# Patient Record
Sex: FEMALE | Race: WHITE | NOT HISPANIC OR LATINO | Employment: OTHER | URBAN - METROPOLITAN AREA
[De-identification: names, ages, dates, MRNs, and addresses within clinical notes are randomized per-mention and may not be internally consistent; named-entity substitution may affect disease eponyms.]

---

## 2018-01-18 ENCOUNTER — GENERIC CONVERSION - ENCOUNTER (OUTPATIENT)
Dept: OTHER | Facility: OTHER | Age: 43
End: 2018-01-18

## 2018-01-18 DIAGNOSIS — Z12.31 ENCOUNTER FOR SCREENING MAMMOGRAM FOR MALIGNANT NEOPLASM OF BREAST: ICD-10-CM

## 2018-01-24 VITALS
WEIGHT: 200 LBS | SYSTOLIC BLOOD PRESSURE: 108 MMHG | BODY MASS INDEX: 30.31 KG/M2 | DIASTOLIC BLOOD PRESSURE: 70 MMHG | HEART RATE: 76 BPM | TEMPERATURE: 98.8 F | RESPIRATION RATE: 16 BRPM | HEIGHT: 68 IN

## 2018-04-09 ENCOUNTER — VBI (OUTPATIENT)
Dept: ADMINISTRATIVE | Facility: OTHER | Age: 43
End: 2018-04-09

## 2018-09-05 ENCOUNTER — APPOINTMENT (OUTPATIENT)
Dept: RADIOLOGY | Facility: CLINIC | Age: 43
End: 2018-09-05
Payer: COMMERCIAL

## 2018-09-05 ENCOUNTER — OFFICE VISIT (OUTPATIENT)
Dept: FAMILY MEDICINE CLINIC | Facility: CLINIC | Age: 43
End: 2018-09-05
Payer: COMMERCIAL

## 2018-09-05 ENCOUNTER — TELEPHONE (OUTPATIENT)
Dept: FAMILY MEDICINE CLINIC | Facility: CLINIC | Age: 43
End: 2018-09-05

## 2018-09-05 ENCOUNTER — TRANSCRIBE ORDERS (OUTPATIENT)
Dept: RADIOLOGY | Facility: CLINIC | Age: 43
End: 2018-09-05

## 2018-09-05 VITALS
RESPIRATION RATE: 16 BRPM | WEIGHT: 213 LBS | DIASTOLIC BLOOD PRESSURE: 80 MMHG | BODY MASS INDEX: 32.28 KG/M2 | SYSTOLIC BLOOD PRESSURE: 130 MMHG | HEIGHT: 68 IN | HEART RATE: 84 BPM | TEMPERATURE: 97.6 F

## 2018-09-05 DIAGNOSIS — M25.572 ACUTE LEFT ANKLE PAIN: ICD-10-CM

## 2018-09-05 DIAGNOSIS — S93.402A SPRAIN OF LEFT ANKLE, UNSPECIFIED LIGAMENT, INITIAL ENCOUNTER: Primary | ICD-10-CM

## 2018-09-05 DIAGNOSIS — S93.402A SPRAIN OF LEFT ANKLE, UNSPECIFIED LIGAMENT, INITIAL ENCOUNTER: ICD-10-CM

## 2018-09-05 DIAGNOSIS — S80.811A ABRASION, RIGHT LOWER LEG, INITIAL ENCOUNTER: ICD-10-CM

## 2018-09-05 PROCEDURE — 3008F BODY MASS INDEX DOCD: CPT | Performed by: NURSE PRACTITIONER

## 2018-09-05 PROCEDURE — 73610 X-RAY EXAM OF ANKLE: CPT

## 2018-09-05 PROCEDURE — 99214 OFFICE O/P EST MOD 30 MIN: CPT | Performed by: NURSE PRACTITIONER

## 2018-09-05 RX ORDER — ALPRAZOLAM 0.5 MG/1
TABLET ORAL
COMMUNITY
Start: 2014-12-05 | End: 2021-10-19

## 2018-09-05 RX ORDER — MONTELUKAST SODIUM 10 MG/1
1 TABLET ORAL DAILY
COMMUNITY
Start: 2014-10-28 | End: 2021-10-19

## 2018-09-05 RX ORDER — ALBUTEROL SULFATE 2.5 MG/3ML
SOLUTION RESPIRATORY (INHALATION)
COMMUNITY
Start: 2014-10-28 | End: 2018-11-01 | Stop reason: SDUPTHER

## 2018-09-05 RX ORDER — ARIPIPRAZOLE 5 MG/1
5 TABLET ORAL DAILY
COMMUNITY
Start: 2015-02-06 | End: 2021-10-19

## 2018-09-05 RX ORDER — ESCITALOPRAM OXALATE 10 MG/1
3 TABLET ORAL DAILY
COMMUNITY
End: 2021-10-19

## 2018-09-05 RX ORDER — FLUTICASONE PROPIONATE 50 MCG
2 SPRAY, SUSPENSION (ML) NASAL DAILY
COMMUNITY
Start: 2018-01-18 | End: 2021-10-19

## 2018-09-05 RX ORDER — BUPROPION HYDROCHLORIDE 300 MG/1
300 TABLET ORAL EVERY MORNING
COMMUNITY
End: 2021-06-24

## 2018-09-05 NOTE — PROGRESS NOTES
Assessment/Plan:    1  Sprain of left ankle, unspecified ligament, initial encounter  Ice, elevation, advil prn  Ace wrap for support  - XR ankle 3+ vw left; Future    2  Acute left ankle pain  Secondary to sprain  Ace wrap  Ice, elevation, advil  - XR ankle 3+ vw left; Future    3  Abrasion, right lower leg, initial encounter  Monitor for signs of infection  Topical abx ointment  No signs of infection           Subjective:      Patient ID: Vera Chávez is a 36 y o  female who presents for ankle pain    Here for left ankle pain  Rolled ankle and fell while walking dog  Has been at a dog show in [facility] and has been walking, but now with some increased pain  Iced  Wrapped  Taking advil  The following portions of the patient's history were reviewed and updated as appropriate: allergies, current medications, past family history, past medical history, past social history, past surgical history and problem list     Review of Systems   Cardiovascular: Negative for leg swelling  No calf pain   Musculoskeletal: Positive for arthralgias (left ankle) and joint swelling  Skin: Positive for wound (scrape to right lower leg)  Neurological: Negative for numbness  Objective:      /80 (BP Location: Left arm, Patient Position: Sitting, Cuff Size: Large)   Pulse 84   Temp 97 6 °F (36 4 °C) (Tympanic)   Resp 16   Ht 5' 8" (1 727 m)   Wt 96 6 kg (213 lb)   BMI 32 39 kg/m²          Physical Exam   Constitutional: She appears well-developed and well-nourished  No distress  Cardiovascular: Normal rate  Pulmonary/Chest: Effort normal    Musculoskeletal: Normal range of motion  She exhibits edema and tenderness  Pain with palpation to left ankle both laterally and medially  Palpable pedal pulse  No discoloration  Mild edema left lateral ankle  Ace wrap applied   Skin: Skin is warm and dry  No erythema  Extensive scabbed abrasion to right lower anterior leg        Psychiatric: She has a normal mood and affect  Her behavior is normal  Judgment and thought content normal    Vitals reviewed

## 2018-09-05 NOTE — TELEPHONE ENCOUNTER
----- Message from Eve Potts sent at 9/5/2018  1:20 PM EDT -----  Please call pt and advise that ankle xray negative, no fracture

## 2018-11-01 ENCOUNTER — OFFICE VISIT (OUTPATIENT)
Dept: FAMILY MEDICINE CLINIC | Facility: CLINIC | Age: 43
End: 2018-11-01
Payer: COMMERCIAL

## 2018-11-01 VITALS
HEIGHT: 68 IN | SYSTOLIC BLOOD PRESSURE: 140 MMHG | TEMPERATURE: 97 F | DIASTOLIC BLOOD PRESSURE: 86 MMHG | HEART RATE: 82 BPM | RESPIRATION RATE: 16 BRPM | BODY MASS INDEX: 32.19 KG/M2 | WEIGHT: 212.4 LBS

## 2018-11-01 DIAGNOSIS — R03.0 ELEVATED BLOOD-PRESSURE READING WITHOUT DIAGNOSIS OF HYPERTENSION: ICD-10-CM

## 2018-11-01 DIAGNOSIS — J18.9 PNEUMONIA OF LEFT UPPER LOBE DUE TO INFECTIOUS ORGANISM: ICD-10-CM

## 2018-11-01 DIAGNOSIS — J45.20 INTERMITTENT ASTHMA, UNSPECIFIED ASTHMA SEVERITY, UNSPECIFIED WHETHER COMPLICATED: ICD-10-CM

## 2018-11-01 DIAGNOSIS — M76.62 ACHILLES BURSITIS OF LEFT LOWER EXTREMITY: Primary | ICD-10-CM

## 2018-11-01 PROCEDURE — 99214 OFFICE O/P EST MOD 30 MIN: CPT | Performed by: NURSE PRACTITIONER

## 2018-11-01 RX ORDER — ALBUTEROL SULFATE 90 UG/1
2 AEROSOL, METERED RESPIRATORY (INHALATION) EVERY 6 HOURS PRN
Qty: 6.7 G | Refills: 0 | Status: SHIPPED | OUTPATIENT
Start: 2018-11-01 | End: 2021-10-19

## 2018-11-01 RX ORDER — AZITHROMYCIN 250 MG/1
TABLET, FILM COATED ORAL
Qty: 6 TABLET | Refills: 0 | Status: SHIPPED | OUTPATIENT
Start: 2018-11-01 | End: 2018-11-05

## 2018-11-01 RX ORDER — BENZONATATE 100 MG/1
100 CAPSULE ORAL 3 TIMES DAILY PRN
Qty: 20 CAPSULE | Refills: 0 | Status: SHIPPED | OUTPATIENT
Start: 2018-11-01 | End: 2019-01-18 | Stop reason: CLARIF

## 2018-11-01 NOTE — PATIENT INSTRUCTIONS
Add over-the-counter Mucinex to your medications  See me to recheck her lungs in her blood pressure in one week  Sooner if you feel worse  Monitor your blood pressure at home and bring me a blood pressure log

## 2018-11-01 NOTE — PROGRESS NOTES
Chief Complaint   Patient presents with    Chills    Nasal Congestion     chest congestion     Foot Swelling     mostly left         Patient ID: Chani Cook is a 39 y o  female  Two new complaints:    Pt is here with new c/o uri sx that started one week ago  Pt reports nasal congestion, cough and shortness of breath  Pt states the cough is nonproductive  Pt states the cough and sob started today, the cough is from the chest  Pt notes feeling feverish and fatigued  No temp measurement at home  Pt notes sore throat at onset that has since resolved  Notes headache, horse voice and ear pressure  Tx at home with decongestants without improvement  Known exposure to her mother with similar illness prior to onset  Current pattern is worsening since onset  Pt reports hx of exercise/allergy induced asthma  No day to day inhaler use  Patient reports penicillin and cephalosporin allergy  Office BP today is stage I hypertension with decongestants in the system  No known history of chronic hypertension  Patient reports an ankle sprain injury in August to the left ankle  Since that time she has noted persistent pain in the Achilles with swelling in the area  She has tried compression hose  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  She states today she does not notice any swelling but the pain in the left Achilles continues  She denies any problems with range of motion in the foot or the ankle on the left side post injury  She would like to have the Achilles evaluated today  She has treated this with ice and rest at home without improvement  History reviewed  No pertinent past medical history  History reviewed  No pertinent surgical history  Patient Active Problem List   Diagnosis    Achilles bursitis of left lower extremity    Elevated blood-pressure reading without diagnosis of hypertension    Asthma    Pneumonia due to infectious organism       History reviewed   No pertinent family history  Immunization History   Administered Date(s) Administered    Tdap 10/28/2011       Allergies   Allergen Reactions    Cephalexin Rash    Penicillins Rash       Current Outpatient Prescriptions   Medication Sig Dispense Refill    ALPRAZolam (XANAX) 0 5 mg tablet Take by mouth      ARIPiprazole (ABILIFY) 5 mg tablet Take by mouth      buPROPion (WELLBUTRIN XL) 300 mg 24 hr tablet Take by mouth      escitalopram (LEXAPRO) 10 mg tablet Take 3 tablets by mouth daily      fluticasone (FLONASE) 50 mcg/act nasal spray 2 sprays into each nostril daily      montelukast (SINGULAIR) 10 mg tablet Take 1 tablet by mouth daily      albuterol (PROVENTIL HFA) 90 mcg/act inhaler Inhale 2 puffs every 6 (six) hours as needed for wheezing 6 7 g 0    azithromycin (ZITHROMAX) 250 mg tablet Take 2 tablets today then 1 tablet daily x 4 days 6 tablet 0    benzonatate (TESSALON PERLES) 100 mg capsule Take 1 capsule (100 mg total) by mouth 3 (three) times a day as needed for cough 20 capsule 0     No current facility-administered medications for this visit  Social History     Social History    Marital status: Single     Spouse name: N/A    Number of children: N/A    Years of education: N/A     Social History Main Topics    Smoking status: Never Smoker    Smokeless tobacco: Never Used    Alcohol use None    Drug use: Unknown    Sexual activity: Not Asked     Other Topics Concern    None     Social History Narrative    None       Review of Systems   Constitutional: Positive for chills and fatigue  HENT: Positive for congestion, postnasal drip, rhinorrhea, sinus pain and sinus pressure  Negative for sore throat and trouble swallowing  Eyes: Negative  Respiratory: Positive for cough and wheezing  Cardiovascular: Negative  Gastrointestinal: Negative  Musculoskeletal: Positive for arthralgias           Objective:    /86 (BP Location: Left arm, Patient Position: Sitting, Cuff Size: Standard)   Pulse 82   Temp (!) 97 °F (36 1 °C)   Resp 16   Ht 5' 8" (1 727 m)   Wt 96 3 kg (212 lb 6 4 oz)   BMI 32 30 kg/m²        Physical Exam   Constitutional: She appears well-developed and well-nourished  HENT:   TMs with scant effusion bilateral no erythema or exudate  The oropharynx is moist and clear no exudate  Turbinates are edematous and erythematous with clear discharge  There is clear postnasal drip  Eyes: Conjunctivae are normal    Cardiovascular: Normal rate, regular rhythm and normal heart sounds  Pulmonary/Chest: She has wheezes  Wheezes and rhonchi entire left lung fields  The right lung is clear  Musculoskeletal: She exhibits tenderness  Tenderness and palpable elevation at the midpoint of the Achilles on the left lower extremity  Lymphadenopathy:     She has no cervical adenopathy  Assessment/Plan:    No problem-specific Assessment & Plan notes found for this encounter  Diagnoses and all orders for this visit:    Achilles bursitis of left lower extremity  Comments: Follow-up with Orthopedic for eval   Avoid NSAIDs for now due to drug drug interactions  Orders:  -     Ambulatory referral to Orthopedic Surgery; Future    Elevated blood-pressure reading without diagnosis of hypertension  Comments:  Return one week recheck  Intermittent asthma, unspecified asthma severity, unspecified whether complicated  Comments:  Exacerbation with current illness  Resume S a BA  Orders:  -     albuterol (PROVENTIL HFA) 90 mcg/act inhaler; Inhale 2 puffs every 6 (six) hours as needed for wheezing    Pneumonia of left upper lobe due to infectious organism [facility])  Comments:  Penicillin allergic  Azithromycin  Tessalon Perles  Over-the-counter Mucinex  Recheck one week sooner if worsening  Orders:  -     benzonatate (TESSALON PERLES) 100 mg capsule;  Take 1 capsule (100 mg total) by mouth 3 (three) times a day as needed for cough  -     azithromycin (ZITHROMAX) 250 mg tablet; Take 2 tablets today then 1 tablet daily x 4 days      avoid fluoroquinolones with current Achilles tendon issue  Patient Instructions   Add over-the-counter Mucinex to your medications  See me to recheck her lungs in her blood pressure in one week  Sooner if you feel worse  Monitor your blood pressure at home and bring me a blood pressure log                      Shira Loomis

## 2018-11-07 ENCOUNTER — OFFICE VISIT (OUTPATIENT)
Dept: FAMILY MEDICINE CLINIC | Facility: CLINIC | Age: 43
End: 2018-11-07
Payer: COMMERCIAL

## 2018-11-07 VITALS
BODY MASS INDEX: 32.23 KG/M2 | TEMPERATURE: 98.6 F | DIASTOLIC BLOOD PRESSURE: 80 MMHG | HEART RATE: 78 BPM | SYSTOLIC BLOOD PRESSURE: 130 MMHG | WEIGHT: 212 LBS | RESPIRATION RATE: 14 BRPM

## 2018-11-07 DIAGNOSIS — R03.0 ELEVATED BLOOD-PRESSURE READING WITHOUT DIAGNOSIS OF HYPERTENSION: ICD-10-CM

## 2018-11-07 DIAGNOSIS — J18.9 PNEUMONIA OF LEFT UPPER LOBE DUE TO INFECTIOUS ORGANISM: Primary | ICD-10-CM

## 2018-11-07 DIAGNOSIS — R05.9 COUGH: ICD-10-CM

## 2018-11-07 DIAGNOSIS — J45.20 INTERMITTENT ASTHMA, UNSPECIFIED ASTHMA SEVERITY, UNSPECIFIED WHETHER COMPLICATED: ICD-10-CM

## 2018-11-07 PROCEDURE — 99213 OFFICE O/P EST LOW 20 MIN: CPT | Performed by: NURSE PRACTITIONER

## 2018-11-07 RX ORDER — METHYLPREDNISOLONE 4 MG/1
TABLET ORAL
Qty: 21 TABLET | Refills: 0 | Status: SHIPPED | OUTPATIENT
Start: 2018-11-07 | End: 2019-01-09

## 2018-11-07 NOTE — PROGRESS NOTES
Chief Complaint   Patient presents with    Follow-up     Pneumonia        Patient ID: Dominic Javier is a 39 y o  female  Patient is here today for follow-up of cough and upper airway infection  She reports she has about 25% or so better but not 100%  She states she still notes marked fatigue and has had to take a few more days off of work  She does with no an ongoing cough that is nonproductive  She has treated this at home with azithromycin and Mucinex and an inhaler  She is using the inhaler every 4 hours with some improvement  She denies fever  She is also here for repeat blood pressure assessment off of the over-the-counter cold medication  Today's blood pressure is normotensive  History reviewed  No pertinent past medical history      Past Surgical History:   Procedure Laterality Date    TONSILLECTOMY         Patient Active Problem List   Diagnosis    Achilles bursitis of left lower extremity    Elevated blood-pressure reading without diagnosis of hypertension    Asthma    Pneumonia due to infectious organism    Cough       Family History   Problem Relation Age of Onset   Eliza Ribaylee Breast cancer Mother     Ulcerative colitis Mother     Hypertension Mother     Lung cancer Mother     Hypertension Father     Lung cancer Father     Breast cancer Maternal Grandmother     Breast cancer Maternal Aunt     Lung cancer Family        Immunization History   Administered Date(s) Administered    Tdap 10/28/2011       Allergies   Allergen Reactions    Cephalexin Rash    Penicillins Rash       Current Outpatient Prescriptions   Medication Sig Dispense Refill    albuterol (PROVENTIL HFA) 90 mcg/act inhaler Inhale 2 puffs every 6 (six) hours as needed for wheezing 6 7 g 0    ARIPiprazole (ABILIFY) 5 mg tablet Take 5 mg by mouth daily        benzonatate (TESSALON PERLES) 100 mg capsule Take 1 capsule (100 mg total) by mouth 3 (three) times a day as needed for cough 20 capsule 0    buPROPion (WELLBUTRIN XL) 300 mg 24 hr tablet 300 mg every morning        fluticasone (FLONASE) 50 mcg/act nasal spray 2 sprays into each nostril daily      montelukast (SINGULAIR) 10 mg tablet Take 1 tablet by mouth daily      ALPRAZolam (XANAX) 0 5 mg tablet Take by mouth      escitalopram (LEXAPRO) 10 mg tablet Take 3 tablets by mouth daily      Methylprednisolone 4 MG TBPK Use as directed on package 21 tablet 0     No current facility-administered medications for this visit  Social History     Social History    Marital status: Single     Spouse name: N/A    Number of children: N/A    Years of education: N/A     Social History Main Topics    Smoking status: Never Smoker    Smokeless tobacco: Never Used    Alcohol use No    Drug use: Unknown    Sexual activity: Not Asked     Other Topics Concern    None     Social History Narrative    None       Review of Systems   Constitutional: Negative  HENT: Positive for congestion  Eyes: Negative  Respiratory: Positive for cough and wheezing  Cardiovascular: Negative  Gastrointestinal: Negative  Objective:    /80 (BP Location: Left arm, Patient Position: Sitting, Cuff Size: Adult)   Pulse 78   Temp 98 6 °F (37 °C) (Temporal)   Resp 14   Wt 96 2 kg (212 lb)   BMI 32 23 kg/m²        Physical Exam   Constitutional: She appears well-developed and well-nourished  HENT:   Mouth/Throat: Oropharynx is clear and moist  No oropharyngeal exudate  Eyes: Conjunctivae are normal    Cardiovascular: Normal rate, regular rhythm and normal heart sounds  Pulmonary/Chest: She has wheezes  She has no rales  Decreased air exchange throughout  Wheeze in the upper lobes  Rhonchi upper lobes  Lymphadenopathy:     She has no cervical adenopathy  Assessment/Plan:    No problem-specific Assessment & Plan notes found for this encounter         Diagnoses and all orders for this visit:    Pneumonia of left upper lobe due to infectious organism [facility])  Comments:  See below  Elevated blood-pressure reading without diagnosis of hypertension  Comments:  Marked improvement from the previous visit  Stay away from over-the-counter cold medicine  Cough  Comments:  Continue inhaler every 4 hours  Add steroids  Complete chest x-ray  Orders:  -     XR chest pa & lateral; Future  -     Methylprednisolone 4 MG TBPK; Use as directed on package    Intermittent asthma, unspecified asthma severity, unspecified whether complicated  Comments:  Current exacerbation with current infection  See steroid above  There are no Patient Instructions on file for this visit                    Mariluz Henderson

## 2018-11-08 ENCOUNTER — TELEPHONE (OUTPATIENT)
Dept: FAMILY MEDICINE CLINIC | Facility: CLINIC | Age: 43
End: 2018-11-08

## 2018-11-08 ENCOUNTER — APPOINTMENT (OUTPATIENT)
Dept: RADIOLOGY | Facility: CLINIC | Age: 43
End: 2018-11-08
Payer: COMMERCIAL

## 2018-11-08 ENCOUNTER — TRANSCRIBE ORDERS (OUTPATIENT)
Dept: RADIOLOGY | Facility: CLINIC | Age: 43
End: 2018-11-08

## 2018-11-08 DIAGNOSIS — R05.9 COUGH: ICD-10-CM

## 2018-11-08 PROCEDURE — 71046 X-RAY EXAM CHEST 2 VIEWS: CPT

## 2018-11-08 NOTE — TELEPHONE ENCOUNTER
----- Message from Rosemarie Andres, [address] sent at 11/8/2018  1:07 PM EST -----  Call pt and let her know no pneumonia on chest xray

## 2018-11-28 ENCOUNTER — OFFICE VISIT (OUTPATIENT)
Dept: OBGYN CLINIC | Facility: CLINIC | Age: 43
End: 2018-11-28
Payer: COMMERCIAL

## 2018-11-28 VITALS
SYSTOLIC BLOOD PRESSURE: 131 MMHG | WEIGHT: 216 LBS | BODY MASS INDEX: 32.74 KG/M2 | HEIGHT: 68 IN | DIASTOLIC BLOOD PRESSURE: 85 MMHG | HEART RATE: 81 BPM

## 2018-11-28 DIAGNOSIS — M76.62 ACHILLES BURSITIS OF LEFT LOWER EXTREMITY: ICD-10-CM

## 2018-11-28 DIAGNOSIS — M76.62 ACHILLES TENDINITIS, LEFT LEG: Primary | ICD-10-CM

## 2018-11-28 PROCEDURE — 99244 OFF/OP CNSLTJ NEW/EST MOD 40: CPT | Performed by: ORTHOPAEDIC SURGERY

## 2018-11-28 NOTE — PROGRESS NOTES
Assessment/Plan:  1  Achilles tendinitis, left leg  Ambulatory referral to Physical Therapy   2  Achilles bursitis of left lower extremity  Ambulatory referral to Orthopedic Surgery    Follow-up with Orthopedic for eval   Avoid NSAIDs for now due to drug drug interactions  Scribe Attestation    I,:   Sridevi Augusto am acting as a scribe while in the presence of the attending physician :        I,:   Anthony Salas MD personally performed the services described in this documentation    as scribed in my presence :              Julisa upon examination review of x-rays from 9/05/2018 demonstrates signs and symptoms consistent with Achilles tendinitis  I do have concern however that she may have suffered a partial tear to the Achilles tendon  She does demonstrate a firm palpable nodule on the Achilles tendon  There is also suspect of a divot on the Achilles tendon that is palpated today  This does in site painful symptoms to palpation  However her Achilles tendon does have continuity as she does demonstrate negative Nesbitt sign  She is also able to do a single leg heel raise on the left foot  However this does exacerbate her symptoms  I would like to treat this conservatively with the use of a heel cup in both of her shoes to alleviate some stress on the Achilles tendon  Also I would like to provide her a prescription for physical therapy to help strengthen her Achilles and overall ankle  I would like stasis follow up with me in 6 weeks at that time should she fail to continue to improve we will discuss getting further diagnostic studies such as an MRI to question a tear to the Achilles tendon  Subjective:   Parul Benavides is a 39 y o  female who presents for initial evaluation of her left Achilles  She is referred by her primary care physician  She states that she suffered her initial ankle injury on 08/18/2018 when she stepped in a hole while walking her dog    She states she was experiencing intermittent and moderate sharp pain about the lateral aspect of her ankle  She notes that this pain in the lateral aspect of her ankle has since resolved however is still experiencing intermittent and mild to moderate soreness at the Achilles tendon  She states that her pain is exacerbated while ascending and descending stairs  Or with direct pressure from her shoes  She notes that her pain is better at rest or with low cut shoes  She does appreciate a lump on the Achilles tendon that is sore to touch  Today she denies any distal paresthesias  Review of Systems   Constitutional: Negative for chills, fever and unexpected weight change  HENT: Negative for hearing loss, nosebleeds and sore throat  Eyes: Negative for pain, redness and visual disturbance  Respiratory: Negative for cough, shortness of breath and wheezing  Cardiovascular: Negative for chest pain, palpitations and leg swelling  Gastrointestinal: Negative for abdominal pain, nausea and vomiting  Endocrine: Negative for polydipsia and polyuria  Genitourinary: Negative for dysuria and hematuria  Musculoskeletal: Positive for myalgias  See HPI   Skin: Negative for rash and wound  Neurological: Negative for dizziness, numbness and headaches  Psychiatric/Behavioral: Negative for decreased concentration and suicidal ideas  The patient is not nervous/anxious  History reviewed  No pertinent past medical history  Past Surgical History:   Procedure Laterality Date    TONSILLECTOMY         Family History   Problem Relation Age of Onset   Lizzie Montague Breast cancer Mother     Ulcerative colitis Mother     Hypertension Mother     Lung cancer Mother     Hypertension Father     Lung cancer Father     Breast cancer Maternal Grandmother     Breast cancer Maternal Aunt     Lung cancer Family        Social History     Occupational History    Not on file       Social History Main Topics    Smoking status: Never Smoker    Smokeless tobacco: Never Used    Alcohol use Yes      Comment: social     Drug use: No    Sexual activity: Not on file         Current Outpatient Prescriptions:     albuterol (PROVENTIL HFA) 90 mcg/act inhaler, Inhale 2 puffs every 6 (six) hours as needed for wheezing, Disp: 6 7 g, Rfl: 0    ALPRAZolam (XANAX) 0 5 mg tablet, Take by mouth, Disp: , Rfl:     ARIPiprazole (ABILIFY) 5 mg tablet, Take 5 mg by mouth daily  , Disp: , Rfl:     benzonatate (TESSALON PERLES) 100 mg capsule, Take 1 capsule (100 mg total) by mouth 3 (three) times a day as needed for cough, Disp: 20 capsule, Rfl: 0    buPROPion (WELLBUTRIN XL) 300 mg 24 hr tablet, 300 mg every morning  , Disp: , Rfl:     escitalopram (LEXAPRO) 10 mg tablet, Take 3 tablets by mouth daily, Disp: , Rfl:     fluticasone (FLONASE) 50 mcg/act nasal spray, 2 sprays into each nostril daily, Disp: , Rfl:     Methylprednisolone 4 MG TBPK, Use as directed on package, Disp: 21 tablet, Rfl: 0    montelukast (SINGULAIR) 10 mg tablet, Take 1 tablet by mouth daily, Disp: , Rfl:     Allergies   Allergen Reactions    Cephalexin Rash    Penicillins Rash       Objective:  Vitals:    11/28/18 1125   BP: 131/85   Pulse: 81       Left Ankle Exam   Swelling: mild (Swelling about the Achilles tendon)    Range of Motion   Dorsiflexion: 25   Plantar flexion: 45   Inversion: 45   Eversion: 25     Muscle Strength   Dorsiflexion:  5/5   Plantar flexion:  4/5     Tests   Anterior drawer: negative  Varus tilt: negative    Other   Erythema: absent  Scars: absent  Sensation: normal  Pulse: present    Comments: Nesbitt test:  Negative          Observations   Left Ankle/Foot   Negative for adhesive scar  Strength/Myotome Testing     Left Ankle/Foot   Dorsiflexion: 5  Plantar flexion: 4      Physical Exam   Constitutional: She is oriented to person, place, and time  She appears well-developed and well-nourished  HENT:   Head: Normocephalic and atraumatic     Eyes: Conjunctivae are normal  Right eye exhibits no discharge  Left eye exhibits no discharge  Neck: Normal range of motion  Neck supple  Cardiovascular: Normal rate and intact distal pulses  Pulmonary/Chest: Effort normal  No respiratory distress  Musculoskeletal:   As noted in HPI   Neurological: She is alert and oriented to person, place, and time  Skin: Skin is warm and dry  Psychiatric: She has a normal mood and affect  Her behavior is normal  Judgment and thought content normal    Nursing note and vitals reviewed  I have personally reviewed pertinent films in PACS and my interpretation is as follows:    X-rays of the left ankle from 9/5/2018 demonstrates no acute fracture other osseous abnormalities

## 2018-11-28 NOTE — LETTER
November 28, 2018     Clem B[address]   [id number]    Patient: Pearl Gallardo   YOB: 1975   Date of Visit: 11/28/2018       Dear Dr She Rodriguez: Thank you for referring Lili Garrison to me for evaluation  Below are the relevant portions of my assessment and plan of care  Julisa upon examination review of x-rays from 9/5/2018 demonstrates signs and symptoms consistent with left Achilles tendinitis  She does demonstrate a suspicious divot on the Achilles tendon as well as a palpable mass that does give me some concern for a partial tear that she may have suffered and has scarred over  The tendon does have continuity as she has a negative Nesbitt test and the Achilles is clearly intact upon palpation  I have provided her with heel cups for each 2 to alleviate tension on the tendon as well as a prescription for physical therapy  Should she fail to see improvements after 6 weeks will order an MRI to question a tear of the Achilles tendon  If you have questions, please do not hesitate to call me  I look forward to following Julisa along with you           Sincerely,        Tracy Taylor MD        CC: No Recipients

## 2018-12-06 ENCOUNTER — EVALUATION (OUTPATIENT)
Dept: PHYSICAL THERAPY | Facility: CLINIC | Age: 43
End: 2018-12-06
Payer: COMMERCIAL

## 2018-12-06 DIAGNOSIS — M76.62 ACHILLES TENDINITIS, LEFT LEG: ICD-10-CM

## 2018-12-06 PROCEDURE — 97161 PT EVAL LOW COMPLEX 20 MIN: CPT | Performed by: PHYSICAL THERAPIST

## 2018-12-06 PROCEDURE — G8978 MOBILITY CURRENT STATUS: HCPCS | Performed by: PHYSICAL THERAPIST

## 2018-12-06 PROCEDURE — G8979 MOBILITY GOAL STATUS: HCPCS | Performed by: PHYSICAL THERAPIST

## 2018-12-06 NOTE — PROGRESS NOTES
PT Evaluation     Today's date: 2018  Patient name: Grover Borges  : 1975  MRN: [id number]  Referring provider: Josey Tomlinson MD  Dx:   Encounter Diagnosis     ICD-10-CM    1  Achilles tendinitis, left leg M76 62 Ambulatory referral to Physical Therapy                  Assessment  Assessment details: Grover Borges is a 39 y o  female who presents with pain, ankle edema, decreased strength, decreased ROM, decreased joint mobility, ambulatory dysfunction and local nodule/tenderness at achilles  Due to these impairments, patient has difficulty performing recreational activities, work-related activities, ambulation, stair negotiation  Patient's clinical presentation is consistent with their referring diagnosis of Achilles tendinitis, left leg  Plan: Ambulatory referral to Physical Therapy  Patient has been educated in home exercise program and plan of care  Patient would benefit from skilled physical therapy services to address their aforementioned functional limitations and progress towards prior level of function and independence with home exercise program      Impairments: abnormal gait, abnormal or restricted ROM, activity intolerance, impaired balance, impaired physical strength, lacks appropriate home exercise program, pain with function, weight-bearing intolerance and poor body mechanics    Goals  Short Term Goals 4 WEEKS from evaluation: Target Date (1/3/19)  1  Establish [place] w/ progressing HEP for ROM/strength  2  Improve AROM L ankle P/D to be within 5 degrees of R to prepare for reciprocal stairs w/o rail and squatting  3  Improve AROM R ankle inversion/eversion by 10/5 degrees respectively to prepare for negotiation of uneven terrain  4  Improve R ankle strength to 4/5 or better  Long Term Goals 8 WEEKS from evaluation: Target Date (19)  1  Improve L ankle strength to 4+/5 or better to allow L SLS x 20" or more     2  Improve tolerance to weight bearing to 2 hours or more, including uneven terrain  3  Ankle AROM WFL to allow recipcrocal stairs w/o handrail and full ROM squatting  4  Reduce palpable nodule tenderness L achilles and L ankle edema by 50% or more  5  Resolve gait deviations  Plan  Plan details:     Patient would benefit from: PT eval and skilled physical therapy  Planned modality interventions: cryotherapy, thermotherapy: hydrocollator packs, unattended electrical stimulation and ultrasound  Other planned modality interventions: ktape  Planned therapy interventions: manual therapy, neuromuscular re-education, therapeutic activities, therapeutic exercise, home exercise program, patient education, functional ROM exercises, strengthening, stretching and joint mobilization  Frequency: 2x week  Duration in weeks: 8  Treatment plan discussed with: patient        Subjective Evaluation    History of Present Illness  Mechanism of injury: Pt is a   She was on a long road trip in 2018, with 5 days of travel ahead of her  Pt fell after twisting her ankle while walking her dog [place]  She completed her trip, with taping and ice  Upon return, she had an Xray taken by primary MD, which was negative  She wore an aircast for a short time, then changed to ACE bandage  Initially, her pain was ant/lateral ankle, but now all her pain is posterior (achilles)  Pain is worst w/ stairs and prolonged standing  She has been avoiding uneven terrain    Pain  Current pain ratin  At best pain ratin  At worst pain ratin  Location: L Achilles  Quality: throbbing and tight  Progression: no change    Social Support  Steps to enter house: yes  Stairs in house: yes     Life stress: Pt is a , so is on her feet a lot and travels often      Diagnostic Tests  X-ray: normal  Treatments  Current treatment: physical therapy  Patient Goals  Patient goals for therapy: decreased pain  Patient goal: improved tolerance to stairs and weight bearing activity, including uneven terrain which she has been avoiding        Objective  AROM:    R   L      12/6/18 12/6/18  Ankle DF calc/5thray  10/5   -2/-7  Ankle PF calc/5thray  45/70   37/54  Ankle inversion  51   52  Ankle eversion   8   3    STRENGTH:    R   L      12/6/18 12/6/18    Ankle DF   5/5   4/5*  Ankle PF   5/5   4-/5*  Ankle inversion  5/5   4/5  Ankle eversoin   5/5   4/5*    Observation:   Edema at lateral maleolus and palpable nodule at achilles insertion which is exquisitely tender to palpation  Bimaleolar circumference (cm) R 25 0/L 27 0   Fig 8 circumferenc (cm) R  52 4/L 53 0      Pt's ambulation demonstrates decreased push off  Balance:  Tandem R posterior w/ EC: 25"  Tandem L posterior w/ EC: 7"  R SLS w/ EO: 60 sec  L SLS w/ EO: 7 sec    Function:  Squat: is partial w/ ERP due to loss of DF ROM  Step up/down: There is early heel rise off step while descending 10" step w/ rail - pt notes stiffness  Precautions: htn    Daily Treatment Diary     Date  Visit # 12/6/18  1       Manual        ktape  achilles                       There Exer         ankle pumps w/ knee ext 20x       Ankle pumps prone knee flex 20x       tband inv/ever/PF grn 15x each                       There activ                                          HEP        NMRed                                                                                Modalities                                HEP = ankle pumps w/ knee extended and w/ knee flexed; TB inv/ever/PF - gave green Tband

## 2018-12-10 ENCOUNTER — OFFICE VISIT (OUTPATIENT)
Dept: PHYSICAL THERAPY | Facility: CLINIC | Age: 43
End: 2018-12-10
Payer: COMMERCIAL

## 2018-12-10 DIAGNOSIS — M76.62 ACHILLES TENDINITIS, LEFT LEG: Primary | ICD-10-CM

## 2018-12-10 PROCEDURE — 97112 NEUROMUSCULAR REEDUCATION: CPT | Performed by: PHYSICAL THERAPIST

## 2018-12-10 NOTE — PROGRESS NOTES
Daily Note     Today's date: 12/10/2018  Patient name: Rudolph Mark  : 1975  MRN: [id number]  Referring provider: Eleanor Meyer MD  Dx:   Encounter Diagnosis     ICD-10-CM    1  Achilles tendinitis, left leg M76 62                   Subjective: Pt thinks ktape was not helpful - she left it on for 2 days  Pain and instability have been "not too bad" the past few days  Objective: See treatment diary below; upon observation - raised nodule seen at evaluation at least 50% reduced  For this reason, applied Ktape again  Precautions: htn    Daily Treatment Diary     Date  Visit # 18  1 12/10/18  2      Manual        ktape  achilles achilles                      There Exer         ankle pumps w/ knee ext 20x 20x      Ankle pumps prone knee flex 20x 20x      tband inv/ever/PF grn 15x each grn 30x each              Bike   L3x5'      There activ  HEP        NMRed  15'      Tandem balance  10"x4 each      Feet together w/ EC  20"x2      Alt tap ups  6" 2x10 each                                                      Modalities        Cont US 1 5 w/cm2 1Mhz  5'                      HEP = continue ankle pumps w/ knee extended and w/ knee flexed; TB inv/ever/PF w/ green Tband  Add tandem balance/feet together w/ EC  Assessment: Tolerated treatment well and does not note discomfort w/ activities like she did the first visit    Patient would benefit from continued PT and is challenged by intro to balance exercises      Plan: Progress treatment as tolerated

## 2018-12-12 ENCOUNTER — OFFICE VISIT (OUTPATIENT)
Dept: PHYSICAL THERAPY | Facility: CLINIC | Age: 43
End: 2018-12-12
Payer: COMMERCIAL

## 2018-12-12 DIAGNOSIS — M76.62 ACHILLES TENDINITIS, LEFT LEG: Primary | ICD-10-CM

## 2018-12-12 PROCEDURE — 97112 NEUROMUSCULAR REEDUCATION: CPT | Performed by: PHYSICAL THERAPIST

## 2018-12-12 NOTE — PROGRESS NOTES
Daily Note     Today's date: 2018  Patient name: Alayna Lee  : 1975  MRN: [id number]  Referring provider: Anne Marie Suarez MD  Dx:   Encounter Diagnosis     ICD-10-CM    1  Achilles tendinitis, left leg M76 62                   Subjective: Pt states she is feeling better, less pain and feeling more stable  Objective: See treatment diary below; upon observation - raised nodule seen at evaluation at least 50% reduced  For this reason, applied Ktape again  Precautions: htn    Daily Treatment Diary     Date  Visit # 18  1 12/10/18  2 18  3     Manual        ktape  achilles achilles achilles                     There Exer         ankle pumps w/ knee ext 20x 20x      Ankle pumps prone knee flex 20x 20x      tband inv/ever/PF grn 15x each grn 30x each              Bike   L3x5' L3x7'     There activ  HEP        NMRed  15' 15'     Tandem balance  10"x4 each On discs  30"x2 each     Feet together w/ EC  20"x2      Alt tap ups  6" 2x10 each      Wobble board PD ROM   20x     Balance on airdiscs   EO 1'  EC 1'                                     Modalities        Cont US 1 5 w/cm2 1Mhz  5' 5'                     HEP = continue ankle pumps w/ knee extended and w/ knee flexed; TB inv/ever/PF w/ green Tband  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

## 2018-12-13 ENCOUNTER — APPOINTMENT (OUTPATIENT)
Dept: PHYSICAL THERAPY | Facility: CLINIC | Age: 43
End: 2018-12-13
Payer: COMMERCIAL

## 2018-12-17 ENCOUNTER — OFFICE VISIT (OUTPATIENT)
Dept: PHYSICAL THERAPY | Facility: CLINIC | Age: 43
End: 2018-12-17
Payer: COMMERCIAL

## 2018-12-17 DIAGNOSIS — M76.62 ACHILLES TENDINITIS, LEFT LEG: Primary | ICD-10-CM

## 2018-12-17 PROCEDURE — 97112 NEUROMUSCULAR REEDUCATION: CPT | Performed by: PHYSICAL THERAPIST

## 2018-12-17 NOTE — PROGRESS NOTES
Daily Note     Today's date: 2018  Patient name: Sabrina Meigs  : 1975  MRN: [id number]  Referring provider: Radha Dukes MD  Dx:   Encounter Diagnosis     ICD-10-CM    1  Achilles tendinitis, left leg M76 62                   Subjective: pt reports she was on her feet a lot the past few days w/ work/travel so was a little sore, "but not too bad"  Objective: See treatment diary below; upon observation - raised nodule seen at evaluation at least 50% reduced  For this reason, applied Ktape again  Precautions: htn    Daily Treatment Diary     Date  Visit # 18  1 12/10/18  2 18  3 18  4    Manual        ktape  achilles achilles achilles achilles                    There Exer         ankle pumps w/ knee ext 20x 20x      Ankle pumps prone knee flex 20x 20x      tband inv/ever/PF grn 15x each grn 30x each              Bike   L3x5' L3x7' L3x7'    There activ  HEP        NMRed  15' 15' 20'    Tandem balance  10"x4 each On discs  30"x2 each On discs  1' each    Feet together w/ EC  20"x2      Alt tap ups  6" 2x10 each      Wobble board PD ROM   20x 30x    Balance on airdiscs   EO 1'  EC 1' EC 1'    FSU w/ reach& balance    6"+2" pad  20x                            Modalities        Cont US 1 5 w/cm2 1Mhz  5' 5' 5'                    HEP = continue ankle pumps w/ knee extended and w/ knee flexed; TB inv/ever/PF w/ green Tband  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

## 2019-01-03 ENCOUNTER — OFFICE VISIT (OUTPATIENT)
Dept: PHYSICAL THERAPY | Facility: CLINIC | Age: 44
End: 2019-01-03
Payer: COMMERCIAL

## 2019-01-03 DIAGNOSIS — M76.62 ACHILLES TENDINITIS, LEFT LEG: Primary | ICD-10-CM

## 2019-01-03 PROCEDURE — 97140 MANUAL THERAPY 1/> REGIONS: CPT | Performed by: PHYSICAL THERAPIST

## 2019-01-03 NOTE — PROGRESS NOTES
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  She reports doing her exercises while away  Objective: See treatment diary below  Precautions: htn    Daily Treatment Diary     Date  Visit # 18  1 12/10/18  2 18  3 18  4 1/3/19  5   Manual     15'   ktape  achilles achilles achilles achilles achilles   IASTM R achilles/gastroc     10'           There Exer         ankle pumps w/ knee ext 20x 20x      Ankle pumps prone knee flex 20x 20x      tband inv/ever/PF grn 15x each grn 30x each              Bike   L3x5' L3x7' L3x7' L3x5'   There activ  HEP        NMRed  15' 15' 20'    Tandem balance  10"x4 each On discs  30"x2 each On discs  1' each    Feet together w/ EC  20"x2      Alt tap ups  6" 2x10 each      Wobble board PD ROM   20x 30x    Balance on airdiscs   EO 1'  EC 1' EC 1'    FSU w/ reach& balance    6"+2" pad  20x                            Modalities        Cont US 1 5 w/cm2 1Mhz  5' 5' 5' 5'                   HEP = continue ankle pumps w/ knee extended and w/ knee flexed; TB inv/ever/PF w/ green Tband  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

## 2019-01-07 ENCOUNTER — APPOINTMENT (OUTPATIENT)
Dept: PHYSICAL THERAPY | Facility: CLINIC | Age: 44
End: 2019-01-07
Payer: COMMERCIAL

## 2019-01-09 ENCOUNTER — OFFICE VISIT (OUTPATIENT)
Dept: OBGYN CLINIC | Facility: CLINIC | Age: 44
End: 2019-01-09
Payer: COMMERCIAL

## 2019-01-09 ENCOUNTER — EVALUATION (OUTPATIENT)
Dept: PHYSICAL THERAPY | Facility: CLINIC | Age: 44
End: 2019-01-09
Payer: COMMERCIAL

## 2019-01-09 VITALS — HEIGHT: 68 IN | WEIGHT: 209.4 LBS | BODY MASS INDEX: 31.74 KG/M2

## 2019-01-09 DIAGNOSIS — M76.62 ACHILLES TENDINITIS, LEFT LEG: Primary | ICD-10-CM

## 2019-01-09 PROCEDURE — G8979 MOBILITY GOAL STATUS: HCPCS | Performed by: PHYSICAL THERAPIST

## 2019-01-09 PROCEDURE — 97140 MANUAL THERAPY 1/> REGIONS: CPT | Performed by: PHYSICAL THERAPIST

## 2019-01-09 PROCEDURE — 99213 OFFICE O/P EST LOW 20 MIN: CPT | Performed by: ORTHOPAEDIC SURGERY

## 2019-01-09 PROCEDURE — G8978 MOBILITY CURRENT STATUS: HCPCS | Performed by: PHYSICAL THERAPIST

## 2019-01-09 NOTE — PROGRESS NOTES
Assessment/Plan:  1  Achilles tendinitis, left leg         Scribe Attestation    I,:   Gely Hanks am acting as a scribe while in the presence of the attending physician :        I,:   Anthony Salas MD personally performed the services described in this documentation    as scribed in my presence :          I am very pleased with Julisa's progress  On exam today, she does demonstrate increased range of motion and strength  She is able to perform a single leg heel raise today in the office  She does still have a palpable nodule in the mid substance of her Achilles tendon, however her Karen Florencio test is still negative  I would like her to continue with formal physical therapy and I did provide her with a new prescription for this today in the office  I reiterated to her that I am impressed with her progress and believe she will do well with continued conservative management  Should she continue to improve, we will consider discharging her to a home exercise program at her next visit  I will see her back in six weeks for repeat clinical evaluation  Subjective:   Parul Benavides is a 39 y o  female who presents today for follow-up evaluation for her left Achilles  She originally suffered an ankle injury in August after stepping in a hole  At her last visit, she was diagnosed with Achilles tendinitis and prescribed physical therapy  She has been participating in formal therapy and states that she feels like she is improving  She notes improvement in her range of motion and strength  At today's visit, she states that she is pain-free at rest but still experiences activity related soreness  She finds that stairs especially aggravate her symptoms  She has been using her heel cups as prescribed  She denies any new injury or trauma  She denies any distal paresthesias of the lower extremity  Review of Systems   Constitutional: Negative for chills, fever and unexpected weight change     HENT: Negative for hearing loss, nosebleeds and sore throat  Eyes: Negative for pain, redness and visual disturbance  Respiratory: Negative for cough, shortness of breath and wheezing  Cardiovascular: Negative for chest pain, palpitations and leg swelling  Gastrointestinal: Negative for abdominal pain, nausea and vomiting  Endocrine: Negative for polydipsia and polyuria  Genitourinary: Negative for dysuria and hematuria  Musculoskeletal: Positive for arthralgias and joint swelling  Negative for myalgias  See HPI   Skin: Negative for rash and wound  Neurological: Negative for dizziness, numbness and headaches  Psychiatric/Behavioral: Negative for decreased concentration and suicidal ideas  The patient is not nervous/anxious  History reviewed  No pertinent past medical history  Past Surgical History:   Procedure Laterality Date    TONSILLECTOMY         Family History   Problem Relation Age of Onset    Breast cancer Mother     Ulcerative colitis Mother     Hypertension Mother    Sandra Quintana Lung cancer Mother     Hypertension Father     Lung cancer Father     Breast cancer Maternal Grandmother     Breast cancer Maternal Aunt     Lung cancer Family     No Known Problems Sister     No Known Problems Brother     No Known Problems Maternal Uncle     No Known Problems Paternal Aunt     No Known Problems Paternal Uncle     No Known Problems Maternal Grandfather     No Known Problems Paternal Grandmother     No Known Problems Paternal Grandfather     ADD / ADHD Neg Hx     Anesthesia problems Neg Hx     Cancer Neg Hx     Clotting disorder Neg Hx     Collagen disease Neg Hx     Diabetes Neg Hx     Dislocations Neg Hx     Learning disabilities Neg Hx     Neurological problems Neg Hx     Osteoporosis Neg Hx     Rheumatologic disease Neg Hx     Scoliosis Neg Hx     Vascular Disease Neg Hx        Social History     Occupational History    Not on file       Social History Main Topics    Smoking status: Never Smoker    Smokeless tobacco: Never Used    Alcohol use Yes      Comment: social     Drug use: No    Sexual activity: Not on file         Current Outpatient Prescriptions:     albuterol (PROVENTIL HFA) 90 mcg/act inhaler, Inhale 2 puffs every 6 (six) hours as needed for wheezing, Disp: 6 7 g, Rfl: 0    ALPRAZolam (XANAX) 0 5 mg tablet, Take by mouth, Disp: , Rfl:     ARIPiprazole (ABILIFY) 5 mg tablet, Take 5 mg by mouth daily  , Disp: , Rfl:     benzonatate (TESSALON PERLES) 100 mg capsule, Take 1 capsule (100 mg total) by mouth 3 (three) times a day as needed for cough, Disp: 20 capsule, Rfl: 0    buPROPion (WELLBUTRIN XL) 300 mg 24 hr tablet, 300 mg every morning  , Disp: , Rfl:     escitalopram (LEXAPRO) 10 mg tablet, Take 3 tablets by mouth daily, Disp: , Rfl:     fluticasone (FLONASE) 50 mcg/act nasal spray, 2 sprays into each nostril daily, Disp: , Rfl:     montelukast (SINGULAIR) 10 mg tablet, Take 1 tablet by mouth daily, Disp: , Rfl:     Allergies   Allergen Reactions    Cephalexin Rash    Penicillins Rash       Objective: There were no vitals filed for this visit  Left Ankle Exam   Swelling: none    Tenderness   Left ankle tenderness location: Achilles tendon  Range of Motion   Dorsiflexion: 20   Plantar flexion: 50     Muscle Strength   Dorsiflexion:  5/5   Plantar flexion:  5/5   Peroneal muscle:  5/5    Other   Erythema: absent  Scars: absent  Sensation: normal  Pulse: present    Comments: Nesbitt (-)  Palpable nodule mid-substance achilles tendon  Minor loss of arch  Able to perform single leg heel raise  Double-leg heel raise causes minor "soreness"          Observations   Left Ankle/Foot   Negative for adhesive scar  Strength/Myotome Testing     Left Ankle/Foot   Dorsiflexion: 5  Plantar flexion: 5      Physical Exam   Constitutional: She is oriented to person, place, and time  She appears well-developed and well-nourished     HENT: Head: Normocephalic and atraumatic  Eyes: Pupils are equal, round, and reactive to light  Conjunctivae are normal    Neck: Normal range of motion  Neck supple  Cardiovascular: Normal rate and intact distal pulses  Pulmonary/Chest: Effort normal  No respiratory distress  Musculoskeletal:   As noted in HPI   Neurological: She is alert and oriented to person, place, and time  Skin: Skin is warm and dry  Psychiatric: She has a normal mood and affect  Her behavior is normal    Vitals reviewed  I have personally reviewed pertinent films in PACS and my interpretation is as follows: No imaging reviewed with the patient

## 2019-01-09 NOTE — PROGRESS NOTES
PT Re-Evaluation     Today's date: 2019  Patient name: Ponce Robertson  : 1975  MRN: [id number]  Referring provider: Agustin Nguyen MD  Dx:   Encounter Diagnosis     ICD-10-CM    1  Achilles tendinitis, left leg M76 [id number]                   Assessment  Assessment details: Ponce Robertson is a 39 y o  female who is progressing well with physical therapy with decreased pain and ankle edema; improving strength and ROM and improving tolerance to functional activities  Local tenderness at mid-achilles is reduced  Patient still has some difficulty performing recreational activities, work-related activities, ambulation, stair negotiation  Patient's clinical presentation is consistent with their referring diagnosis of Achilles tendinitis, left leg  Plan: Ambulatory referral to Physical Therapy  Patient has been educated in home exercise program and plan of care, and would benefit from skilled physical therapy services to address her remaining functional limitations and progress towards prior level of function and long term goals  Impairments: abnormal gait, abnormal or restricted ROM, activity intolerance, impaired balance, impaired physical strength, lacks appropriate home exercise program, pain with function, weight-bearing intolerance and poor body mechanics    Goals  Short Term Goals 4 WEEKS from evaluation: Target Date (1/3/19) - met  1  Establish [place] w/ progressing HEP for ROM/strength  2  Improve AROM L ankle P/D to be within 5 degrees of R to prepare for reciprocal stairs w/o rail and squatting  3  Improve AROM R ankle inversion/eversion by 10/5 degrees respectively to prepare for negotiation of uneven terrain  4  Improve R ankle strength to 4/5 or better  Long Term Goals 10 WEEKS from evaluation: Target Date (19) -ongoing/partially met  1  Improve L ankle strength to 4+/5 or better to allow L SLS x 20" or more  - met  2   Improve tolerance to weight bearing to 2 hours or more, including uneven terrain  - intermittently met  3  Ankle AROM WFL to allow recipcrocal stairs w/o handrail and full ROM squatting - intermittently met  4  Reduce palpable nodule tenderness L achilles and L ankle edema by 50% or more  - met  5  Resolve gait deviations  - met    Plan  Plan details:     Patient would benefit from: PT eval and skilled physical therapy  Planned modality interventions: cryotherapy, thermotherapy: hydrocollator packs, unattended electrical stimulation and ultrasound  Other planned modality interventions: ktape  Planned therapy interventions: manual therapy, neuromuscular re-education, therapeutic activities, therapeutic exercise, home exercise program, patient education, functional ROM exercises, strengthening, stretching and joint mobilization  Frequency: 2x week  Plan of Care expiration date: 2019  Treatment plan discussed with: patient        Subjective Evaluation    History of Present Illness  Mechanism of injury: Pt is a   She was on a long road trip in 2018, with 5 days of travel ahead of her  Pt fell after twisting her ankle while walking [place] dog [place]  She completed her trip, with taping and ice  Upon return, she had an Xray taken by primary MD, which was negative  She wore an aircast for a short time, then changed to ACE bandage  Initially, her pain was ant/lateral ankle, but then moved to posterior (achilles)  Pt has attended 6 PT sessions and reports feeling much better  She saw Dr Allen jorge a m  Who was happy with her progress thus far  Pain remains worseprolonged standing or uneven terrain    Pain  Current pain ratin  At best pain ratin  At worst pain rating: 3  Location: L Achilles  Quality: throbbing and tight  Progression: no change    Social Support  Steps to enter house: yes  Stairs in house: yes     Life stress: Pt is a , so is on her feet a lot and travels often      Diagnostic Tests  X-ray: normal  Treatments  Current treatment: physical therapy  Patient Goals  Patient goals for therapy: decreased pain  Patient goal: improved tolerance to stairs and weight bearing activity, including uneven terrain which she has been avoiding        Objective  AROM:    L  R  L      1/9/19 12/6/18 12/6/18  Ankle DF calc/5thray  15/8  10/5  -2/-7  Ankle PF calc/5thray  38/60  45/70  37/54  Ankle inversion  52  51  52  Ankle eversion   8 8  3    STRENGTH:    L  R  L      1/9/19 12/6/18 12/6/18    Ankle DF   5/5  5/5  4/5*  Ankle PF   4+/5*  5/5  4-/5*  Ankle inversion  5/5  5/5  4/5  Ankle eversoin   4+/5  5/5  4/5*    Pt can now perform L uni HR w/ minor discomfort 5x  Observation:   Edema at lateral maleolus and palpable nodule at achilles insertion which is exquisitely tender to palpation  Bimaleolar circumference (cm) L  26 0 1/9/19; R 25 0/L 27 0 12/6/18   Fig 8 circumferenc (cm) L  52 4 1/9/19; R  52 4/L 53 0 12/6/18      Pt's ambulation demonstrates improved push off w ambulation  Balance:  Tandem R posterior w/ EC: 1/9/19; 25" 12/6/18  Tandem L posterior w/ EC: 1/9/19; 7" 12/6/18  R SLS w/ EO: 60 sec 1/9/19; 12/6/18  L SLS w/ EO: 7 sec 1/9/19;  12/6/18    Function:  Squat: is now full w/o pain  Step up/down: WNL  Pt reports pain still occurs if she's on her feet all day, usually if it's for consecutive days and/or w/ uneven terrain  Precautions: htn    Daily Treatment Diary     Date  Visit # 1/9/19  6  RE/FOTO       Manual        ktape  achilles       IASTM R achilles/gastroc 10'               There Exer                Bike         There activ                                          HEP        NMRed        Tandem balance        Feet together w/ EC        Alt tap ups        Wobble board PD ROM        Balance on airdiscs        FSU w/ reach& balance                                Modalities        Cont US 1 5 w/cm2 1Mhz 5'                       HEP = continue ankle pumps w/ knee extended and w/ knee flexed; TB inv/ever/PF w/ green Tband   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

## 2019-01-10 ENCOUNTER — OFFICE VISIT (OUTPATIENT)
Dept: PHYSICAL THERAPY | Facility: CLINIC | Age: 44
End: 2019-01-10
Payer: COMMERCIAL

## 2019-01-10 DIAGNOSIS — M76.62 ACHILLES TENDINITIS, LEFT LEG: Primary | ICD-10-CM

## 2019-01-10 PROCEDURE — 97112 NEUROMUSCULAR REEDUCATION: CPT | Performed by: PHYSICAL THERAPIST

## 2019-01-18 ENCOUNTER — OFFICE VISIT (OUTPATIENT)
Dept: FAMILY MEDICINE CLINIC | Facility: CLINIC | Age: 44
End: 2019-01-18
Payer: COMMERCIAL

## 2019-01-18 VITALS
WEIGHT: 205 LBS | BODY MASS INDEX: 31.07 KG/M2 | SYSTOLIC BLOOD PRESSURE: 136 MMHG | TEMPERATURE: 97.4 F | RESPIRATION RATE: 18 BRPM | DIASTOLIC BLOOD PRESSURE: 90 MMHG | HEIGHT: 68 IN | HEART RATE: 80 BPM

## 2019-01-18 DIAGNOSIS — R06.2 WHEEZING: ICD-10-CM

## 2019-01-18 DIAGNOSIS — R05.9 COUGH: Primary | ICD-10-CM

## 2019-01-18 PROBLEM — J18.9 PNEUMONIA DUE TO INFECTIOUS ORGANISM: Status: RESOLVED | Noted: 2018-11-01 | Resolved: 2019-01-18

## 2019-01-18 PROCEDURE — 1036F TOBACCO NON-USER: CPT | Performed by: FAMILY MEDICINE

## 2019-01-18 PROCEDURE — 3008F BODY MASS INDEX DOCD: CPT | Performed by: FAMILY MEDICINE

## 2019-01-18 PROCEDURE — 99214 OFFICE O/P EST MOD 30 MIN: CPT | Performed by: FAMILY MEDICINE

## 2019-01-18 RX ORDER — FLUTICASONE FUROATE AND VILANTEROL 200; 25 UG/1; UG/1
1 POWDER RESPIRATORY (INHALATION) DAILY
Qty: 1 EACH | Refills: 0 | Status: SHIPPED | OUTPATIENT
Start: 2019-01-18 | End: 2021-10-19

## 2019-01-18 RX ORDER — DOXYCYCLINE HYCLATE 100 MG/1
100 CAPSULE ORAL EVERY 12 HOURS SCHEDULED
Qty: 14 CAPSULE | Refills: 0 | Status: SHIPPED | OUTPATIENT
Start: 2019-01-18 | End: 2019-01-25

## 2019-01-18 RX ORDER — PROMETHAZINE HYDROCHLORIDE AND CODEINE PHOSPHATE 6.25; 1 MG/5ML; MG/5ML
5 SYRUP ORAL EVERY 4 HOURS PRN
Qty: 120 ML | Refills: 0 | Status: SHIPPED | OUTPATIENT
Start: 2019-01-18 | End: 2021-10-19

## 2019-01-18 NOTE — PROGRESS NOTES
Chief Complaint   Patient presents with    Cough        Patient ID: Anand Baltazar is a 39 y o  female  HPI  Pt is seeing for URI with cough and wheezing x 1 wk -  No therapy tried, using rescue inhaler as needed, has mild Asthma - not on inhaled steroids     The following portions of the patient's history were reviewed and updated as appropriate: allergies, current medications, past family history, past medical history, past social history, past surgical history and problem list     Review of Systems   Constitutional: Negative  HENT: Positive for congestion, postnasal drip, rhinorrhea and sore throat  Negative for ear pain  Respiratory: Positive for cough and wheezing  Negative for chest tightness and shortness of breath  Gastrointestinal: Negative  Genitourinary: Negative  Current Outpatient Prescriptions   Medication Sig Dispense Refill    albuterol (PROVENTIL HFA) 90 mcg/act inhaler Inhale 2 puffs every 6 (six) hours as needed for wheezing 6 7 g 0    ALPRAZolam (XANAX) 0 5 mg tablet Take by mouth      ARIPiprazole (ABILIFY) 5 mg tablet Take 5 mg by mouth daily        buPROPion (WELLBUTRIN XL) 300 mg 24 hr tablet 300 mg every morning        escitalopram (LEXAPRO) 10 mg tablet Take 3 tablets by mouth daily      fluticasone (FLONASE) 50 mcg/act nasal spray 2 sprays into each nostril daily      montelukast (SINGULAIR) 10 mg tablet Take 1 tablet by mouth daily       No current facility-administered medications for this visit  Objective:    /90 (BP Location: Left arm, Patient Position: Sitting, Cuff Size: Large)   Pulse 80   Temp (!) 97 4 °F (36 3 °C) (Tympanic)   Resp 18   Ht 5' 8" (1 727 m)   Wt 93 kg (205 lb)   BMI 31 17 kg/m²        Physical Exam   Constitutional: No distress     HENT:   Right Ear: Tympanic membrane and ear canal normal    Left Ear: Tympanic membrane and ear canal normal    Nose: Right sinus exhibits no maxillary sinus tenderness and no frontal sinus tenderness  Left sinus exhibits no maxillary sinus tenderness and no frontal sinus tenderness  Mouth/Throat: No oropharyngeal exudate  Eyes: Conjunctivae are normal    Cardiovascular: Normal rate, regular rhythm and normal heart sounds  No murmur heard  Pulmonary/Chest: Effort normal  No respiratory distress  She has wheezes  She has no rales  Assessment/Plan:         Diagnoses and all orders for this visit:    Cough  -     doxycycline hyclate (VIBRAMYCIN) 100 mg capsule; Take 1 capsule (100 mg total) by mouth every 12 (twelve) hours for 7 days  -     promethazine-codeine (PHENERGAN WITH CODEINE) 6 25-10 mg/5 mL syrup; Take 5 mL by mouth every 4 (four) hours as needed for cough    Wheezing  -     fluticasone-vilanterol (BREO ELLIPTA) 200-25 MCG/INH inhaler; Inhale 1 puff daily Rinse mouth after use            rto prn                   Cristy Hernández MD

## 2019-01-22 ENCOUNTER — APPOINTMENT (OUTPATIENT)
Dept: PHYSICAL THERAPY | Facility: CLINIC | Age: 44
End: 2019-01-22
Payer: COMMERCIAL

## 2019-01-24 ENCOUNTER — APPOINTMENT (OUTPATIENT)
Dept: PHYSICAL THERAPY | Facility: CLINIC | Age: 44
End: 2019-01-24
Payer: COMMERCIAL

## 2019-02-12 NOTE — PROGRESS NOTES
2/12/19- Attempted to call pt to inquire about status: continue or D/C? Her voice mailbox is full  As of her last PT visit, she reported feeling pretty good, but planned on continuing therapy after traveling for work for one week  She has not been seen in over a month  D/C   TT

## 2021-04-08 DIAGNOSIS — Z23 ENCOUNTER FOR IMMUNIZATION: ICD-10-CM

## 2021-05-27 ENCOUNTER — TELEPHONE (OUTPATIENT)
Dept: FAMILY MEDICINE CLINIC | Facility: CLINIC | Age: 46
End: 2021-05-27

## 2021-06-24 ENCOUNTER — APPOINTMENT (EMERGENCY)
Dept: RADIOLOGY | Facility: HOSPITAL | Age: 46
End: 2021-06-24
Payer: COMMERCIAL

## 2021-06-24 ENCOUNTER — HOSPITAL ENCOUNTER (EMERGENCY)
Facility: HOSPITAL | Age: 46
Discharge: HOME | End: 2021-06-24
Attending: EMERGENCY MEDICINE
Payer: COMMERCIAL

## 2021-06-24 VITALS
RESPIRATION RATE: 18 BRPM | SYSTOLIC BLOOD PRESSURE: 148 MMHG | OXYGEN SATURATION: 97 % | BODY MASS INDEX: 31.29 KG/M2 | HEART RATE: 88 BPM | TEMPERATURE: 99.8 F | WEIGHT: 205.8 LBS | DIASTOLIC BLOOD PRESSURE: 74 MMHG

## 2021-06-24 DIAGNOSIS — M25.551 RIGHT HIP PAIN: ICD-10-CM
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
## 2021-06-24 LAB
EXT PREG TEST URINE: NEGATIVE
EXT. CONTROL ED NAV: NORMAL

## 2021-06-24 PROCEDURE — 73700 CT LOWER EXTREMITY W/O DYE: CPT

## 2021-06-24 PROCEDURE — 99284 EMERGENCY DEPT VISIT MOD MDM: CPT

## 2021-06-24 PROCEDURE — 81025 URINE PREGNANCY TEST: CPT | Performed by: EMERGENCY MEDICINE

## 2021-06-24 PROCEDURE — 73502 X-RAY EXAM HIP UNI 2-3 VIEWS: CPT

## 2021-06-24 PROCEDURE — 99284 EMERGENCY DEPT VISIT MOD MDM: CPT | Performed by: EMERGENCY MEDICINE

## 2021-06-24 RX ORDER — OXYCODONE HYDROCHLORIDE AND ACETAMINOPHEN 5; 325 MG/1; MG/1
1 TABLET ORAL ONCE
Status: COMPLETED | OUTPATIENT
Start: 2021-06-24 | End: 2021-06-24

## 2021-06-24 RX ADMIN — OXYCODONE HYDROCHLORIDE AND ACETAMINOPHEN 1 TABLET: 5; 325 TABLET ORAL at 18:18

## 2021-06-24 NOTE — ED PROVIDER NOTES
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 Taking?    ALPRAZolam (XANAX) 0 5 mg tablet Unknown at Unknown time Self Yes No   Sig: Take by mouth   ARIPiprazole (ABILIFY) 5 mg tablet Unknown at Unknown time Self Yes No   Sig: Take 5 mg by mouth daily     albuterol (PROVENTIL HFA) 90 mcg/act inhaler Unknown at Unknown time Self No No   Sig: Inhale 2 puffs every 6 (six) hours as needed for wheezing   escitalopram (LEXAPRO) 10 mg tablet Unknown at Unknown time Self Yes No   Sig: Take 3 tablets by mouth daily   fluticasone (FLONASE) 50 mcg/act nasal spray Unknown at Unknown time Self Yes No   Si sprays into each nostril daily   fluticasone-vilanterol (BREO ELLIPTA) 200-25 MCG/INH inhaler Unknown at Unknown time  No No   Sig: Inhale 1 puff daily Rinse mouth after use    montelukast (SINGULAIR) 10 mg tablet Unknown at Unknown time Self Yes No   Sig: Take 1 tablet by mouth daily   promethazine-codeine (PHENERGAN WITH CODEINE) 6 25-10 mg/5 mL syrup   No No   Sig: Take 5 mL by mouth every 4 (four) hours as needed for cough      Facility-Administered Medications: None       Past Medical History:   Diagnosis Date    Anxiety     Depression        Past Surgical History:   Procedure Laterality Date    TONSILLECTOMY         Family History   Problem Relation Age of Onset    Breast cancer Mother     Ulcerative colitis Mother     Hypertension Mother    Julsage Jose Malejandro Lung cancer Mother     Hypertension Father     Lung cancer Father     Breast cancer Maternal Grandmother     Breast cancer Maternal Aunt     Lung cancer Family     No Known Problems Sister     No Known Problems Brother     No Known Problems Maternal Uncle     No Known Problems Paternal Aunt     No Known Problems Paternal Uncle     No Known Problems Maternal Grandfather     No Known Problems Paternal Grandmother     No Known Problems Paternal Grandfather     ADD / ADHD Neg Hx     Anesthesia problems Neg Hx     Cancer Neg Hx     Clotting disorder Neg Hx     Collagen disease Neg Hx     Diabetes Neg Hx     Dislocations Neg Hx     Learning disabilities Neg Hx     Neurological problems Neg Hx     Osteoporosis Neg Hx     Rheumatologic disease Neg Hx     Scoliosis Neg Hx     Vascular Disease Neg Hx      I have reviewed and agree with the history as documented  E-Cigarette/Vaping    E-Cigarette Use Never User      E-Cigarette/Vaping Substances     Social History     Tobacco Use    Smoking status: Never Smoker    Smokeless tobacco: Never Used   Vaping Use    Vaping Use: Never used   Substance Use Topics    Alcohol use: Yes     Comment: social     Drug use: No       Review of Systems   Constitutional: Negative  Negative for diaphoresis and fever  HENT: Negative  Respiratory: Negative  Negative for cough, shortness of breath and wheezing  Cardiovascular: Negative  Negative for chest pain, palpitations and leg swelling  Gastrointestinal: Negative for abdominal distention, abdominal pain, nausea and vomiting  Genitourinary: Negative  Musculoskeletal:        Right hip pain   Skin: Negative  Neurological: Negative  Psychiatric/Behavioral: Negative  All other systems reviewed and are negative  Physical Exam  Physical Exam  Constitutional:       Appearance: She is well-developed  HENT:      Head: Normocephalic and atraumatic     Eyes:      Pupils: Pupils are equal, round, and reactive to light  Cardiovascular:      Rate and Rhythm: Normal rate and regular rhythm  Heart sounds: Normal heart sounds  No murmur heard  Pulmonary:      Effort: Pulmonary effort is normal       Breath sounds: Normal breath sounds  Abdominal:      General: Bowel sounds are normal  There is no distension  Palpations: Abdomen is soft  Tenderness: There is no abdominal tenderness  There is no guarding or rebound  Musculoskeletal:         General: Normal range of motion  Cervical back: Normal range of motion and neck supple  Comments: Right hip tenderness  Pain with internal rotation  Normal pusles  Soft compartments    Skin:     General: Skin is warm and dry  Neurological:      Mental Status: She is alert and oriented to person, place, and time  Vital Signs  ED Triage Vitals [06/24/21 1711]   Temperature Pulse Respirations Blood Pressure SpO2   99 8 °F (37 7 °C) 88 18 148/74 97 %      Temp Source Heart Rate Source Patient Position - Orthostatic VS BP Location FiO2 (%)   Tympanic Monitor Sitting Left arm --      Pain Score       6           Vitals:    06/24/21 1711   BP: 148/74   Pulse: 88   Patient Position - Orthostatic VS: Sitting         Visual Acuity      ED Medications  Medications   oxyCODONE-acetaminophen (PERCOCET) 5-325 mg per tablet 1 tablet (1 tablet Oral Given 6/24/21 1818)       Diagnostic Studies  Results Reviewed     Procedure Component Value Units Date/Time    POCT pregnancy, urine [[id number]]  (Normal) Resulted: 06/24/21 1920    Lab Status: Final result Updated: 06/24/21 1920     EXT PREG TEST UR (Ref: Negative) negative     Control valid                 CT lower extremity wo contrast right   Final Result by Yaakov Boo DO (06/24 2029)      Normal CT of the right hip  Workstation performed: [id number]         XR hip/pelv 2-3 vws right   Final Result by Yaakov Boo DO (06/24 2029)      No acute osseous abnormality  Workstation performed: [id number]                    Procedures  Procedures         ED Course                                           MDM    Disposition  Final diagnoses:   Fall   Right hip pain     Time reflects when diagnosis was documented in both MDM as applicable and the Disposition within this note     Time User Action Codes Description Comment    6/24/2021  8:49 PM Christina Mancini U  8  [W19  XXXA] Fall     6/24/2021  8:49 PM Kristie, [address] [facility] Right hip pain       ED Disposition     ED Disposition Condition Date/Time Comment    Discharge Stable u Jun 24, 2021  8:48 PM Misha Heswetha discharge to home/self care              Follow-up Information     Follow up With Specialties Details Why [address], [id number] Charles Katz, Nurse Practitioner Schedule an appointment as soon as possible for a visit   [address] [place] [address]  [phone]            Discharge Medication List as of 6/24/2021  8:49 PM      CONTINUE these medications which have NOT CHANGED    Details   albuterol (PROVENTIL HFA) 90 mcg/act inhaler Inhale 2 puffs every 6 (six) hours as needed for wheezing, Starting Thu 11/1/2018, Normal      ALPRAZolam (XANAX) 0 5 mg tablet Take by mouth, Starting Fri 12/5/2014, Historical Med      ARIPiprazole (ABILIFY) 5 mg tablet Take 5 mg by mouth daily  , Starting Fri 2/6/2015, Historical Med      escitalopram (LEXAPRO) 10 mg tablet Take 3 tablets by mouth daily, Historical Med      fluticasone (FLONASE) 50 mcg/act nasal spray 2 sprays into each nostril daily, Starting Thu 1/18/2018, Historical Med      fluticasone-vilanterol (BREO ELLIPTA) 200-25 MCG/INH inhaler Inhale 1 puff daily Rinse mouth after use , Starting Fri 1/18/2019, Normal      montelukast (SINGULAIR) 10 mg tablet Take 1 tablet by mouth daily, Starting Tue 10/28/2014, Historical Med      promethazine-codeine (PHENERGAN WITH CODEINE) 6 25-10 mg/5 mL syrup Take 5 mL by mouth every 4 (four) hours as needed for cough, Starting Fri 1/18/2019, Normal           No discharge procedures on file      PDMP Review     None          ED Provider  Electronically Signed by           Archie Hankins MD  06/25/21 5615

## 2021-10-15 ENCOUNTER — HOSPITAL ENCOUNTER (EMERGENCY)
Facility: HOSPITAL | Age: 46
Discharge: HOME | End: 2021-10-15
Attending: EMERGENCY MEDICINE
Payer: COMMERCIAL

## 2021-10-15 ENCOUNTER — APPOINTMENT (EMERGENCY)
Dept: RADIOLOGY | Facility: HOSPITAL | Age: 46
End: 2021-10-15
Payer: COMMERCIAL

## 2021-10-15 VITALS
DIASTOLIC BLOOD PRESSURE: 85 MMHG | BODY MASS INDEX: 29.62 KG/M2 | OXYGEN SATURATION: 97 % | TEMPERATURE: 97 F | WEIGHT: 200 LBS | RESPIRATION RATE: 16 BRPM | HEART RATE: 81 BPM | SYSTOLIC BLOOD PRESSURE: 153 MMHG | HEIGHT: 69 IN

## 2021-10-15 DIAGNOSIS — S16.1XXA STRAIN OF NECK MUSCLE, INITIAL ENCOUNTER: ICD-10-CM
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
## 2021-10-15 DIAGNOSIS — S20.219A CHEST WALL CONTUSION: ICD-10-CM

## 2021-10-15 PROCEDURE — 99284 EMERGENCY DEPT VISIT MOD MDM: CPT

## 2021-10-15 PROCEDURE — 90471 IMMUNIZATION ADMIN: CPT

## 2021-10-15 PROCEDURE — 90715 TDAP VACCINE 7 YRS/> IM: CPT | Performed by: PHYSICIAN ASSISTANT

## 2021-10-15 PROCEDURE — 71046 X-RAY EXAM CHEST 2 VIEWS: CPT

## 2021-10-15 PROCEDURE — 99284 EMERGENCY DEPT VISIT MOD MDM: CPT | Performed by: PHYSICIAN ASSISTANT

## 2021-10-15 RX ADMIN — TETANUS TOXOID, REDUCED DIPHTHERIA TOXOID AND ACELLULAR PERTUSSIS VACCINE, ADSORBED 0.5 ML: 5; 2.5; 8; 8; 2.5 SUSPENSION INTRAMUSCULAR at 14:02

## 2021-10-18 ENCOUNTER — VBI (OUTPATIENT)
Dept: FAMILY MEDICINE CLINIC | Facility: CLINIC | Age: 46
End: 2021-10-18

## 2021-10-19 ENCOUNTER — OFFICE VISIT (OUTPATIENT)
Dept: FAMILY MEDICINE CLINIC | Facility: CLINIC | Age: 46
End: 2021-10-19
Payer: COMMERCIAL

## 2021-10-19 VITALS
DIASTOLIC BLOOD PRESSURE: 80 MMHG | TEMPERATURE: 97.8 F | SYSTOLIC BLOOD PRESSURE: 130 MMHG | BODY MASS INDEX: 30.21 KG/M2 | RESPIRATION RATE: 14 BRPM | WEIGHT: 204 LBS | HEIGHT: 69 IN | HEART RATE: 78 BPM

## 2021-10-19 DIAGNOSIS — M54.2 NECK PAIN, ACUTE: ICD-10-CM

## 2021-10-19 DIAGNOSIS — Z12.31 ENCOUNTER FOR SCREENING MAMMOGRAM FOR BREAST CANCER: ICD-10-CM

## 2021-10-19 DIAGNOSIS — F33.1 MODERATE EPISODE OF RECURRENT MAJOR DEPRESSIVE DISORDER (HCC): ICD-10-CM

## 2021-10-19 DIAGNOSIS — Z12.4 SCREENING FOR CERVICAL CANCER: ICD-10-CM

## 2021-10-19 DIAGNOSIS — M54.50 ACUTE MIDLINE LOW BACK PAIN WITHOUT SCIATICA: Primary | ICD-10-CM

## 2021-10-19 PROCEDURE — 99214 OFFICE O/P EST MOD 30 MIN: CPT | Performed by: FAMILY MEDICINE

## 2021-10-19 RX ORDER — CYCLOBENZAPRINE HCL 10 MG
10 TABLET ORAL
Qty: 20 TABLET | Refills: 0 | Status: SHIPPED | OUTPATIENT
Start: 2021-10-19 | End: 2021-11-15

## 2021-10-19 RX ORDER — IBUPROFEN 600 MG/1
600 TABLET ORAL EVERY 8 HOURS PRN
Qty: 30 TABLET | Refills: 0 | Status: SHIPPED | OUTPATIENT
Start: 2021-10-19 | End: 2021-11-15

## 2021-10-19 RX ORDER — METHYLPREDNISOLONE 4 MG/1
TABLET ORAL
Qty: 21 EACH | Refills: 0 | Status: SHIPPED | OUTPATIENT
Start: 2021-10-19 | End: 2021-11-15

## 2021-11-09 ENCOUNTER — TELEPHONE (OUTPATIENT)
Dept: FAMILY MEDICINE CLINIC | Facility: CLINIC | Age: 46
End: 2021-11-09

## 2021-11-15 ENCOUNTER — TELEPHONE (OUTPATIENT)
Dept: FAMILY MEDICINE CLINIC | Facility: CLINIC | Age: 46
End: 2021-11-15

## 2021-11-15 ENCOUNTER — OFFICE VISIT (OUTPATIENT)
Dept: FAMILY MEDICINE CLINIC | Facility: CLINIC | Age: 46
End: 2021-11-15
Payer: COMMERCIAL

## 2021-11-15 VITALS
RESPIRATION RATE: 16 BRPM | DIASTOLIC BLOOD PRESSURE: 94 MMHG | TEMPERATURE: 97.5 F | BODY MASS INDEX: 30.36 KG/M2 | SYSTOLIC BLOOD PRESSURE: 146 MMHG | WEIGHT: 205 LBS | HEIGHT: 69 IN | HEART RATE: 84 BPM

## 2021-11-15 DIAGNOSIS — J45.40 MODERATE PERSISTENT ASTHMA WITHOUT COMPLICATION: Primary | ICD-10-CM

## 2021-11-15 DIAGNOSIS — I10 PRIMARY HYPERTENSION: ICD-10-CM

## 2021-11-15 PROCEDURE — 99214 OFFICE O/P EST MOD 30 MIN: CPT | Performed by: FAMILY MEDICINE

## 2021-11-15 RX ORDER — FLUTICASONE FUROATE AND VILANTEROL TRIFENATATE 100; 25 UG/1; UG/1
1 POWDER RESPIRATORY (INHALATION) DAILY
Qty: 180 BLISTER | Refills: 1 | Status: SHIPPED | OUTPATIENT
Start: 2021-11-15 | End: 2022-05-05

## 2021-11-15 RX ORDER — ALBUTEROL SULFATE 90 UG/1
2 AEROSOL, METERED RESPIRATORY (INHALATION) EVERY 6 HOURS PRN
COMMUNITY
End: 2021-11-15 | Stop reason: SDUPTHER

## 2021-11-15 RX ORDER — MONTELUKAST SODIUM 10 MG/1
10 TABLET ORAL
COMMUNITY
End: 2021-11-15 | Stop reason: SDUPTHER

## 2021-11-15 RX ORDER — HYDROCHLOROTHIAZIDE 12.5 MG/1
12.5 TABLET ORAL DAILY
Qty: 90 TABLET | Refills: 1 | Status: SHIPPED | OUTPATIENT
Start: 2021-11-15 | End: 2021-11-29

## 2021-11-15 RX ORDER — ALBUTEROL SULFATE 90 UG/1
2 AEROSOL, METERED RESPIRATORY (INHALATION) EVERY 6 HOURS PRN
Qty: 8 G | Refills: 1 | Status: SHIPPED | OUTPATIENT
Start: 2021-11-15

## 2021-11-15 RX ORDER — MONTELUKAST SODIUM 10 MG/1
10 TABLET ORAL
Qty: 90 TABLET | Refills: 1 | Status: SHIPPED | OUTPATIENT
Start: 2021-11-15

## 2021-11-28 ENCOUNTER — HOSPITAL ENCOUNTER (EMERGENCY)
Facility: HOSPITAL | Age: 46
Discharge: HOME | End: 2021-11-28
Attending: EMERGENCY MEDICINE | Admitting: EMERGENCY MEDICINE
Payer: COMMERCIAL

## 2021-11-28 ENCOUNTER — APPOINTMENT (EMERGENCY)
Dept: RADIOLOGY | Facility: HOSPITAL | Age: 46
End: 2021-11-28
Payer: COMMERCIAL

## 2021-11-28 VITALS
TEMPERATURE: 98.7 F | OXYGEN SATURATION: 97 % | SYSTOLIC BLOOD PRESSURE: 143 MMHG | HEIGHT: 68 IN | RESPIRATION RATE: 20 BRPM | BODY MASS INDEX: 32.61 KG/M2 | DIASTOLIC BLOOD PRESSURE: 84 MMHG | WEIGHT: 215.17 LBS | HEART RATE: 68 BPM

## 2021-11-28 DIAGNOSIS — R07.9 CHEST PAIN: Primary | ICD-10-CM

## 2021-11-28 LAB
4HR DELTA HS TROPONIN: 0 NG/L
ALBUMIN SERPL BCP-MCNC: 4.3 G/DL (ref 3.5–5)
ALP SERPL-CCNC: 67 U/L (ref 46–116)
ALT SERPL W P-5'-P-CCNC: 40 U/L (ref 12–78)
ANION GAP SERPL CALCULATED.3IONS-SCNC: 10 MMOL/L (ref 4–13)
AST SERPL W P-5'-P-CCNC: 28 U/L (ref 5–45)
BASOPHILS # BLD AUTO: 0.06 THOUSANDS/ΜL (ref 0–0.1)
BASOPHILS NFR BLD AUTO: 1 % (ref 0–1)
BILIRUB SERPL-MCNC: 0.32 MG/DL (ref 0.2–1)
BUN SERPL-MCNC: 26 MG/DL (ref 5–25)
CALCIUM SERPL-MCNC: 9.6 MG/DL (ref 8.3–10.1)
CARDIAC TROPONIN I PNL SERPL HS: 22 NG/L
CARDIAC TROPONIN I PNL SERPL HS: 22 NG/L
CHLORIDE SERPL-SCNC: 102 MMOL/L (ref 100–108)
CO2 SERPL-SCNC: 28 MMOL/L (ref 21–32)
CREAT SERPL-MCNC: 1.01 MG/DL (ref 0.6–1.3)
EOSINOPHIL # BLD AUTO: 0.89 THOUSAND/ΜL (ref 0–0.61)
EOSINOPHIL NFR BLD AUTO: 9 % (ref 0–6)
ERYTHROCYTE [DISTWIDTH] IN BLOOD BY AUTOMATED COUNT: 12.8 % (ref 11.6–15.1)
GFR SERPL CREATININE-BSD FRML MDRD: 66 ML/MIN/1.73SQ M
GLUCOSE SERPL-MCNC: 97 MG/DL (ref 65–140)
HCT VFR BLD AUTO: 41.1 % (ref 34.8–46.1)
HGB BLD-MCNC: 13.2 G/DL (ref 11.5–15.4)
IMM GRANULOCYTES # BLD AUTO: 0.04 THOUSAND/UL (ref 0–0.2)
IMM GRANULOCYTES NFR BLD AUTO: 0 % (ref 0–2)
LIPASE SERPL-CCNC: 143 U/L (ref 73–393)
LYMPHOCYTES # BLD AUTO: 2.7 THOUSANDS/ΜL (ref 0.6–4.47)
LYMPHOCYTES NFR BLD AUTO: 26 % (ref 14–44)
MCH RBC QN AUTO: 30.1 PG (ref 26.8–34.3)
MCHC RBC AUTO-ENTMCNC: 32.1 G/DL (ref 31.4–37.4)
MCV RBC AUTO: 94 FL (ref 82–98)
MONOCYTES # BLD AUTO: 0.72 THOUSAND/ΜL (ref 0.17–1.22)
MONOCYTES NFR BLD AUTO: 7 % (ref 4–12)
NEUTROPHILS # BLD AUTO: 5.82 THOUSANDS/ΜL (ref 1.85–7.62)
NEUTS SEG NFR BLD AUTO: 57 % (ref 43–75)
NRBC BLD AUTO-RTO: 0 /100 WBCS
PLATELET # BLD AUTO: 357 THOUSANDS/UL (ref 149–390)
PMV BLD AUTO: 9.3 FL (ref 8.9–12.7)
POTASSIUM SERPL-SCNC: 3.7 MMOL/L (ref 3.5–5.3)
PROT SERPL-MCNC: 7.8 G/DL (ref 6.4–8.2)
RBC # BLD AUTO: 4.39 MILLION/UL (ref 3.81–5.12)
SODIUM SERPL-SCNC: 140 MMOL/L (ref 136–145)
WBC # BLD AUTO: 10.23 THOUSAND/UL (ref 4.31–10.16)

## 2021-11-28 PROCEDURE — 83690 ASSAY OF LIPASE: CPT | Performed by: EMERGENCY MEDICINE

## 2021-11-28 PROCEDURE — 85025 COMPLETE CBC W/AUTO DIFF WBC: CPT

## 2021-11-28 PROCEDURE — 93005 ELECTROCARDIOGRAM TRACING: CPT

## 2021-11-28 PROCEDURE — 80053 COMPREHEN METABOLIC PANEL: CPT

## 2021-11-28 PROCEDURE — 99285 EMERGENCY DEPT VISIT HI MDM: CPT

## 2021-11-28 PROCEDURE — 99285 EMERGENCY DEPT VISIT HI MDM: CPT | Performed by: EMERGENCY MEDICINE

## 2021-11-28 PROCEDURE — 96374 THER/PROPH/DIAG INJ IV PUSH: CPT

## 2021-11-28 PROCEDURE — 36415 COLL VENOUS BLD VENIPUNCTURE: CPT

## 2021-11-28 PROCEDURE — 71045 X-RAY EXAM CHEST 1 VIEW: CPT

## 2021-11-28 PROCEDURE — 84484 ASSAY OF TROPONIN QUANT: CPT

## 2021-11-28 RX ORDER — ASPIRIN 81 MG/1
324 TABLET, CHEWABLE ORAL ONCE
Status: COMPLETED | OUTPATIENT
Start: 2021-11-28 | End: 2021-11-28

## 2021-11-28 RX ORDER — ONDANSETRON 2 MG/ML
4 INJECTION INTRAMUSCULAR; INTRAVENOUS ONCE
Status: COMPLETED | OUTPATIENT
Start: 2021-11-28 | End: 2021-11-28

## 2021-11-28 RX ORDER — NITROGLYCERIN 0.4 MG/1
0.4 TABLET SUBLINGUAL ONCE
Status: COMPLETED | OUTPATIENT
Start: 2021-11-28 | End: 2021-11-28

## 2021-11-28 RX ADMIN — ASPIRIN 81 MG CHEWABLE TABLET 324 MG: 81 TABLET CHEWABLE at 18:04

## 2021-11-28 RX ADMIN — ONDANSETRON 4 MG: 2 INJECTION INTRAMUSCULAR; INTRAVENOUS at 18:05

## 2021-11-28 RX ADMIN — NITROGLYCERIN 0.4 MG: 0.4 TABLET SUBLINGUAL at 18:07

## 2021-11-29 ENCOUNTER — OFFICE VISIT (OUTPATIENT)
Dept: FAMILY MEDICINE CLINIC | Facility: CLINIC | Age: 46
End: 2021-11-29
Payer: COMMERCIAL

## 2021-11-29 VITALS
RESPIRATION RATE: 18 BRPM | SYSTOLIC BLOOD PRESSURE: 130 MMHG | HEART RATE: 82 BPM | DIASTOLIC BLOOD PRESSURE: 90 MMHG | BODY MASS INDEX: 30.31 KG/M2 | HEIGHT: 68 IN | TEMPERATURE: 97.2 F | WEIGHT: 200 LBS

## 2021-11-29 DIAGNOSIS — I10 PRIMARY HYPERTENSION: Primary | ICD-10-CM

## 2021-11-29 DIAGNOSIS — R00.2 PALPITATIONS: ICD-10-CM

## 2021-11-29 PROBLEM — R03.0 ELEVATED BLOOD-PRESSURE READING WITHOUT DIAGNOSIS OF HYPERTENSION: Status: RESOLVED | Noted: 2018-11-01 | Resolved: 2021-11-29

## 2021-11-29 PROCEDURE — 1036F TOBACCO NON-USER: CPT | Performed by: FAMILY MEDICINE

## 2021-11-29 PROCEDURE — 3075F SYST BP GE 130 - 139MM HG: CPT | Performed by: FAMILY MEDICINE

## 2021-11-29 PROCEDURE — 99213 OFFICE O/P EST LOW 20 MIN: CPT | Performed by: FAMILY MEDICINE

## 2021-11-29 PROCEDURE — 3080F DIAST BP >= 90 MM HG: CPT | Performed by: FAMILY MEDICINE

## 2021-11-29 PROCEDURE — 3008F BODY MASS INDEX DOCD: CPT | Performed by: FAMILY MEDICINE

## 2021-11-29 RX ORDER — LISINOPRIL AND HYDROCHLOROTHIAZIDE 12.5; 1 MG/1; MG/1
1 TABLET ORAL DAILY
Qty: 90 TABLET | Refills: 3 | Status: SHIPPED | OUTPATIENT
Start: 2021-11-29 | End: 2021-12-01

## 2021-12-01 ENCOUNTER — TELEPHONE (OUTPATIENT)
Dept: FAMILY MEDICINE CLINIC | Facility: CLINIC | Age: 46
End: 2021-12-01

## 2021-12-04 LAB
ATRIAL RATE: 81 BPM
P AXIS: 68 DEGREES
PR INTERVAL: 134 MS
QRS AXIS: 37 DEGREES
QRSD INTERVAL: 82 MS
QT INTERVAL: 358 MS
QTC INTERVAL: 415 MS
T WAVE AXIS: 56 DEGREES
VENTRICULAR RATE: 81 BPM

## 2021-12-04 PROCEDURE — 93010 ELECTROCARDIOGRAM REPORT: CPT | Performed by: INTERNAL MEDICINE

## 2022-04-04 ENCOUNTER — APPOINTMENT (OUTPATIENT)
Dept: RADIOLOGY | Facility: CLINIC | Age: 47
End: 2022-04-04
Payer: COMMERCIAL

## 2022-04-04 ENCOUNTER — OFFICE VISIT (OUTPATIENT)
Dept: FAMILY MEDICINE CLINIC | Facility: CLINIC | Age: 47
End: 2022-04-04
Payer: COMMERCIAL

## 2022-04-04 VITALS
HEIGHT: 68 IN | BODY MASS INDEX: 31.1 KG/M2 | TEMPERATURE: 97.9 F | DIASTOLIC BLOOD PRESSURE: 90 MMHG | RESPIRATION RATE: 16 BRPM | HEART RATE: 62 BPM | WEIGHT: 205.2 LBS | SYSTOLIC BLOOD PRESSURE: 142 MMHG

## 2022-04-04 DIAGNOSIS — R10.2 PELVIC PAIN: ICD-10-CM

## 2022-04-04 DIAGNOSIS — M54.41 CHRONIC LEFT-SIDED LOW BACK PAIN WITH RIGHT-SIDED SCIATICA: ICD-10-CM

## 2022-04-04 DIAGNOSIS — G89.29 CHRONIC LEFT-SIDED LOW BACK PAIN WITH RIGHT-SIDED SCIATICA: ICD-10-CM

## 2022-04-04 DIAGNOSIS — I10 PRIMARY HYPERTENSION: ICD-10-CM

## 2022-04-04 DIAGNOSIS — N95.0 POSTMENOPAUSAL BLEEDING: ICD-10-CM

## 2022-04-04 DIAGNOSIS — R14.0 BLOATING SYMPTOM: Primary | ICD-10-CM

## 2022-04-04 DIAGNOSIS — Z12.31 ENCOUNTER FOR SCREENING MAMMOGRAM FOR MALIGNANT NEOPLASM OF BREAST: ICD-10-CM

## 2022-04-04 DIAGNOSIS — R82.90 ABNORMAL URINE FINDING: ICD-10-CM

## 2022-04-04 LAB
SL AMB  POCT GLUCOSE, UA: ABNORMAL
SL AMB LEUKOCYTE ESTERASE,UA: ABNORMAL
SL AMB POCT BILIRUBIN,UA: ABNORMAL
SL AMB POCT BLOOD,UA: ABNORMAL
SL AMB POCT CLARITY,UA: CLEAR
SL AMB POCT COLOR,UA: YELLOW
SL AMB POCT KETONES,UA: 15
SL AMB POCT NITRITE,UA: ABNORMAL
SL AMB POCT PH,UA: 25
SL AMB POCT SPECIFIC GRAVITY,UA: 1.01
SL AMB POCT URINE PROTEIN: ABNORMAL
SL AMB POCT UROBILINOGEN: ABNORMAL

## 2022-04-04 PROCEDURE — 3077F SYST BP >= 140 MM HG: CPT | Performed by: FAMILY MEDICINE

## 2022-04-04 PROCEDURE — 1036F TOBACCO NON-USER: CPT | Performed by: FAMILY MEDICINE

## 2022-04-04 PROCEDURE — 3008F BODY MASS INDEX DOCD: CPT | Performed by: FAMILY MEDICINE

## 2022-04-04 PROCEDURE — 81003 URINALYSIS AUTO W/O SCOPE: CPT | Performed by: FAMILY MEDICINE

## 2022-04-04 PROCEDURE — 3080F DIAST BP >= 90 MM HG: CPT | Performed by: FAMILY MEDICINE

## 2022-04-04 PROCEDURE — 99214 OFFICE O/P EST MOD 30 MIN: CPT | Performed by: FAMILY MEDICINE

## 2022-04-04 PROCEDURE — 72100 X-RAY EXAM L-S SPINE 2/3 VWS: CPT

## 2022-04-04 RX ORDER — AMLODIPINE BESYLATE 5 MG/1
5 TABLET ORAL DAILY
Qty: 30 TABLET | Refills: 0 | Status: SHIPPED | OUTPATIENT
Start: 2022-04-04 | End: 2022-05-05 | Stop reason: SDUPTHER

## 2022-04-04 RX ORDER — HYDROCHLOROTHIAZIDE 12.5 MG/1
12.5 TABLET ORAL DAILY
COMMUNITY
End: 2022-04-04 | Stop reason: SDUPTHER

## 2022-04-04 RX ORDER — CIPROFLOXACIN 500 MG/1
500 TABLET, FILM COATED ORAL EVERY 12 HOURS SCHEDULED
Qty: 6 TABLET | Refills: 0 | Status: SHIPPED | OUTPATIENT
Start: 2022-04-04 | End: 2022-04-07

## 2022-04-04 RX ORDER — HYDROCHLOROTHIAZIDE 12.5 MG/1
12.5 TABLET ORAL DAILY
Qty: 30 TABLET | Refills: 0 | Status: SHIPPED | OUTPATIENT
Start: 2022-04-04 | End: 2022-05-05 | Stop reason: SDUPTHER

## 2022-04-06 ENCOUNTER — HOSPITAL ENCOUNTER (OUTPATIENT)
Dept: RADIOLOGY | Facility: HOSPITAL | Age: 47
Discharge: HOME | End: 2022-04-06
Attending: FAMILY MEDICINE
Payer: COMMERCIAL

## 2022-04-06 DIAGNOSIS — R14.0 BLOATING SYMPTOM: ICD-10-CM

## 2022-04-06 DIAGNOSIS — N95.0 POSTMENOPAUSAL BLEEDING: ICD-10-CM

## 2022-04-06 DIAGNOSIS — R10.2 PELVIC PAIN: ICD-10-CM

## 2022-04-06 LAB
BACTERIA UR CULT: NORMAL
Lab: NORMAL

## 2022-04-06 PROCEDURE — 76830 TRANSVAGINAL US NON-OB: CPT

## 2022-04-06 PROCEDURE — 76856 US EXAM PELVIC COMPLETE: CPT

## 2022-04-07 ENCOUNTER — TELEPHONE (OUTPATIENT)
Dept: FAMILY MEDICINE CLINIC | Facility: CLINIC | Age: 47
End: 2022-04-07

## 2022-04-07 NOTE — TELEPHONE ENCOUNTER
----- Message from Franchesca Mullen MD sent at 4/6/2022 10:18 PM EDT -----  Please inform urine cx negative-

## 2022-04-11 PROBLEM — G89.29 CHRONIC LEFT-SIDED LOW BACK PAIN WITH RIGHT-SIDED SCIATICA: Status: ACTIVE | Noted: 2022-04-11

## 2022-04-11 PROBLEM — M54.41 CHRONIC LEFT-SIDED LOW BACK PAIN WITH RIGHT-SIDED SCIATICA: Status: ACTIVE | Noted: 2022-04-11

## 2022-04-11 PROBLEM — R10.2 PELVIC PAIN: Status: ACTIVE | Noted: 2022-04-11

## 2022-04-11 PROBLEM — Z12.31 ENCOUNTER FOR SCREENING MAMMOGRAM FOR MALIGNANT NEOPLASM OF BREAST: Status: ACTIVE | Noted: 2022-04-11

## 2022-04-11 PROBLEM — R82.90 ABNORMAL URINE FINDING: Status: ACTIVE | Noted: 2022-04-11

## 2022-04-11 PROBLEM — N95.0 POSTMENOPAUSAL BLEEDING: Status: ACTIVE | Noted: 2022-04-11

## 2022-04-11 PROBLEM — R14.0 BLOATING SYMPTOM: Status: ACTIVE | Noted: 2022-04-11

## 2022-04-11 NOTE — PROGRESS NOTES
Assessment/Plan:    1  Bloating symptom  -     US pelvis complete w transvaginal; Future; Expected date: 04/04/2022  -     ciprofloxacin (CIPRO) 500 mg tablet; Take 1 tablet (500 mg total) by mouth every 12 (twelve) hours for 3 days    2  Pelvic pain  -     US pelvis complete w transvaginal; Future; Expected date: 04/04/2022  -     ciprofloxacin (CIPRO) 500 mg tablet; Take 1 tablet (500 mg total) by mouth every 12 (twelve) hours for 3 days    3  Postmenopausal bleeding  -     US pelvis complete w transvaginal; Future; Expected date: 04/04/2022  -     POCT urine dip auto non-scope    4  Abnormal urine finding  -     ciprofloxacin (CIPRO) 500 mg tablet; Take 1 tablet (500 mg total) by mouth every 12 (twelve) hours for 3 days    5  Encounter for screening mammogram for malignant neoplasm of breast  -     Mammo screening bilateral w 3d & cad; Future; Expected date: 04/04/2022    6  Chronic left-sided low back pain with right-sided sciatica  -     XR spine lumbar 2 or 3 views injury; Future; Expected date: 04/04/2022    7  Primary hypertension  -     amLODIPine (NORVASC) 5 mg tablet; Take 1 tablet (5 mg total) by mouth daily  -     hydrochlorothiazide (HYDRODIURIL) 12 5 mg tablet; Take 1 tablet (12 5 mg total) by mouth daily  -     POCT urine dip auto non-scope  -     Urine culture    8  BMI 31 0-31 9,adult      BMI Counseling: Body mass index is 31 2 kg/m²  The BMI is above normal  Nutrition recommendations include encouraging healthy choices of fruits and vegetables, consuming healthier snacks, moderation in carbohydrate intake, increasing intake of lean protein, reducing intake of saturated and trans fat and reducing intake of cholesterol  Exercise recommendations include exercising 3-5 times per week and strength training exercises  No pharmacotherapy was ordered  Rationale for BMI follow-up plan is due to patient being overweight or obese  Subjective:      Patient ID: Rosario Arias is a 51 y o  female  Chief Complaint   Patient presents with    Back Pain     left lower side that radiates around left front abdominal area , 1 month ago pain started        Back Pain  This is a recurrent problem  The problem occurs intermittently  The problem has been gradually worsening since onset  The pain is present in the lumbar spine  The quality of the pain is described as aching and shooting  Radiates to: pelvic area  The pain is moderate  Associated symptoms include abdominal pain, dysuria, pelvic pain and tingling  Pertinent negatives include no bladder incontinence, bowel incontinence or fever  Risk factors include obesity  Pelvic Pain  The patient's primary symptoms include pelvic pain and vaginal bleeding  The patient's pertinent negatives include no genital odor  This is a recurrent problem  Associated symptoms include abdominal pain, back pain and dysuria  Pertinent negatives include no fever  The vaginal bleeding is spotting  She has not been passing clots  She has not been passing tissue  The following portions of the patient's history were reviewed and updated as appropriate: allergies, current medications, past family history, past medical history, past social history, past surgical history and problem list     Review of Systems   Constitutional: Negative for fever  Gastrointestinal: Positive for abdominal pain  Negative for bowel incontinence  Genitourinary: Positive for dysuria and pelvic pain  Negative for bladder incontinence  Musculoskeletal: Positive for back pain  Neurological: Positive for tingling           Current Outpatient Medications   Medication Sig Dispense Refill    albuterol (PROVENTIL HFA,VENTOLIN HFA) 90 mcg/act inhaler Inhale 2 puffs every 6 (six) hours as needed for wheezing 8 g 1    hydrochlorothiazide (HYDRODIURIL) 12 5 mg tablet Take 1 tablet (12 5 mg total) by mouth daily 30 tablet 0    montelukast (SINGULAIR) 10 mg tablet Take 1 tablet (10 mg total) by mouth daily at bedtime 90 tablet 1    amLODIPine (NORVASC) 5 mg tablet Take 1 tablet (5 mg total) by mouth daily 30 tablet 0    fluticasone-vilanterol (Breo Ellipta) 100-25 mcg/inh inhaler Inhale 1 puff daily Rinse mouth after use  180 blister 1     No current facility-administered medications for this visit  Objective:    /90 (BP Location: Left arm, Patient Position: Sitting, Cuff Size: Large)   Pulse 62   Temp 97 9 °F (36 6 °C)   Resp 16   Ht 5' 8" (1 727 m)   Wt 93 1 kg (205 lb 3 2 oz)   BMI 31 20 kg/m²        Physical Exam  Vitals and nursing note reviewed  Constitutional:       General: She is not in acute distress  Comments: OW   Cardiovascular:      Rate and Rhythm: Normal rate and regular rhythm  Pulmonary:      Effort: Pulmonary effort is normal  No respiratory distress  Breath sounds: Normal breath sounds  Abdominal:      General: Bowel sounds are normal       Palpations: Abdomen is soft  Tenderness: There is abdominal tenderness (suprapubic)  There is no right CVA tenderness, left CVA tenderness, guarding or rebound  Musculoskeletal:         General: Tenderness (b/l LS paravertebral-L>R) present  Normal range of motion  Cervical back: Neck supple  Skin:     General: Skin is warm and dry  Coloration: Skin is not jaundiced  Findings: No rash  Neurological:      General: No focal deficit present  Mental Status: She is alert and oriented to person, place, and time  Cranial Nerves: No cranial nerve deficit           Renan Mcmillan MD

## 2022-05-03 ENCOUNTER — HOSPITAL ENCOUNTER (OUTPATIENT)
Dept: RADIOLOGY | Facility: HOSPITAL | Age: 47
Discharge: HOME | End: 2022-05-03
Attending: FAMILY MEDICINE
Payer: COMMERCIAL

## 2022-05-03 VITALS — WEIGHT: 200 LBS | HEIGHT: 68 IN | BODY MASS INDEX: 30.31 KG/M2

## 2022-05-03 DIAGNOSIS — Z12.31 ENCOUNTER FOR SCREENING MAMMOGRAM FOR MALIGNANT NEOPLASM OF BREAST: ICD-10-CM

## 2022-05-03 PROCEDURE — 77063 BREAST TOMOSYNTHESIS BI: CPT

## 2022-05-03 PROCEDURE — 77067 SCR MAMMO BI INCL CAD: CPT

## 2022-05-05 ENCOUNTER — OFFICE VISIT (OUTPATIENT)
Dept: FAMILY MEDICINE CLINIC | Facility: CLINIC | Age: 47
End: 2022-05-05
Payer: COMMERCIAL

## 2022-05-05 VITALS
WEIGHT: 206 LBS | RESPIRATION RATE: 14 BRPM | HEART RATE: 72 BPM | HEIGHT: 68 IN | BODY MASS INDEX: 31.22 KG/M2 | SYSTOLIC BLOOD PRESSURE: 125 MMHG | DIASTOLIC BLOOD PRESSURE: 85 MMHG | TEMPERATURE: 98.3 F

## 2022-05-05 DIAGNOSIS — I10 PRIMARY HYPERTENSION: ICD-10-CM

## 2022-05-05 DIAGNOSIS — J45.40 MODERATE PERSISTENT ASTHMA WITHOUT COMPLICATION: Primary | ICD-10-CM

## 2022-05-05 DIAGNOSIS — E66.09 CLASS 1 OBESITY DUE TO EXCESS CALORIES WITH SERIOUS COMORBIDITY AND BODY MASS INDEX (BMI) OF 31.0 TO 31.9 IN ADULT: ICD-10-CM

## 2022-05-05 PROBLEM — R82.90 ABNORMAL URINE FINDING: Status: RESOLVED | Noted: 2022-04-11 | Resolved: 2022-05-05

## 2022-05-05 PROBLEM — N95.0 POSTMENOPAUSAL BLEEDING: Status: RESOLVED | Noted: 2022-04-11 | Resolved: 2022-05-05

## 2022-05-05 PROBLEM — E66.811 CLASS 1 OBESITY DUE TO EXCESS CALORIES WITH SERIOUS COMORBIDITY AND BODY MASS INDEX (BMI) OF 31.0 TO 31.9 IN ADULT: Status: ACTIVE | Noted: 2022-05-05

## 2022-05-05 PROBLEM — R10.2 PELVIC PAIN: Status: RESOLVED | Noted: 2022-04-11 | Resolved: 2022-05-05

## 2022-05-05 PROBLEM — R14.0 BLOATING SYMPTOM: Status: RESOLVED | Noted: 2022-04-11 | Resolved: 2022-05-05

## 2022-05-05 PROCEDURE — 99214 OFFICE O/P EST MOD 30 MIN: CPT | Performed by: FAMILY MEDICINE

## 2022-05-05 RX ORDER — FLUTICASONE PROPIONATE 50 MCG
1 BLISTER, WITH INHALATION DEVICE INHALATION 2 TIMES DAILY
Qty: 60 BLISTER | Refills: 6 | Status: SHIPPED | OUTPATIENT
Start: 2022-05-05 | End: 2022-06-04

## 2022-05-05 RX ORDER — AMLODIPINE BESYLATE 5 MG/1
5 TABLET ORAL DAILY
Qty: 90 TABLET | Refills: 1 | Status: SHIPPED | OUTPATIENT
Start: 2022-05-05

## 2022-05-05 RX ORDER — HYDROCHLOROTHIAZIDE 25 MG/1
25 TABLET ORAL DAILY
Qty: 90 TABLET | Refills: 1 | Status: SHIPPED | OUTPATIENT
Start: 2022-05-05

## 2022-05-05 NOTE — PROGRESS NOTES
Chief Complaint   Patient presents with    Blood Pressure Check    Asthma        Patient ID: Rosario Arias is a 51 y o  female  HPI  Pt is seeing for f/u HTN amd Asthma-   Stopped Breo b/o cost -  BP at home in 130-140s/80-90s  -  No weight loss, no regular exercises - does not follow strict low Na diet     The following portions of the patient's history were reviewed and updated as appropriate: allergies, current medications, past family history, past medical history, past social history, past surgical history and problem list     Review of Systems   Constitutional: Negative  HENT: Negative  Respiratory: Positive for chest tightness (few episodes over last few days  -  was using rescue inhaler )  Negative for cough, shortness of breath and wheezing  Cardiovascular: Negative  Gastrointestinal: Negative  Genitourinary: Negative  Musculoskeletal: Negative  Neurological: Negative  Current Outpatient Medications   Medication Sig Dispense Refill    albuterol (PROVENTIL HFA,VENTOLIN HFA) 90 mcg/act inhaler Inhale 2 puffs every 6 (six) hours as needed for wheezing 8 g 1    amLODIPine (NORVASC) 5 mg tablet Take 1 tablet (5 mg total) by mouth daily 30 tablet 0    hydrochlorothiazide (HYDRODIURIL) 12 5 mg tablet Take 1 tablet (12 5 mg total) by mouth daily 30 tablet 0    montelukast (SINGULAIR) 10 mg tablet Take 1 tablet (10 mg total) by mouth daily at bedtime 90 tablet 1    fluticasone-vilanterol (Breo Ellipta) 100-25 mcg/inh inhaler Inhale 1 puff daily Rinse mouth after use  180 blister 1     No current facility-administered medications for this visit  Objective:    /78 (BP Location: Left arm, Patient Position: Sitting, Cuff Size: Adult)   Pulse 72   Temp 98 3 °F (36 8 °C) (Temporal)   Resp 14   Ht 5' 8" (1 727 m)   Wt 93 4 kg (206 lb)   BMI 31 32 kg/m²        Physical Exam  Constitutional:       Appearance: She is obese  She is not ill-appearing     Cardiovascular: Rate and Rhythm: Normal rate and regular rhythm  Heart sounds: No murmur heard  Pulmonary:      Effort: Pulmonary effort is normal  No respiratory distress  Breath sounds: No wheezing, rhonchi or rales  Neurological:      General: No focal deficit present  Mental Status: She is alert and oriented to person, place, and time  Psychiatric:         Mood and Affect: Mood normal                  Assessment/Plan:         Diagnoses and all orders for this visit:    Moderate persistent asthma without complication  -  Will try    fluticasone (Flovent Diskus) 50 MCG/BLIST diskus inhaler; Inhale 1 puff 2 (two) times a day Rinse mouth after use  Primary hypertension  -    Cont  amLODIPine (NORVASC) 5 mg tablet; Take 1 tablet (5 mg total) by mouth daily  -  Will increase med from 12 5 mg to   hydrochlorothiazide (HYDRODIURIL) 25 mg tablet;  Take 1 tablet (25 mg total) by mouth daily  Low Na diet   Daily exercises   BP log in 1 m for review     Class 1 obesity due to excess calories with serious comorbidity and body mass index (BMI) of 31 0 to 31 9 in adult      weight loss advised       rto in 3 m                 Celestina Ashley MD

## 2022-05-09 NOTE — PROGRESS NOTES
Assessment/Plan   Problem List Items Addressed This Visit     None          Discussion    All questions have been answered to her satisfaction  RTO for APE or sooner if needed      Subjective     HPI   Liliana  is a 51 y o  female who presents for annual well woman exam    LMP - approx 2019ish  Never on HRT  Pt had no menses for years and then more recently has had some irregular sporadic bleeding  Sometimes will last a few days, sometimes will resolve in the days  Has noticed this since December  No vulvar itch/burn; No vaginal itch/burn; No abn discharge or odor; No urinary sx - burning/pain/frequency/hematuria    (+) SBEs - no breast masses, asymmetry, nipple discharge or bleeding, changes in skin of breast, or breast tenderness bilaterally  No abd/pelvic pain or HAs; No menopausal symptoms: No hot flashes/night sweats, problems with intercourse, vaginal dryness; sleeping well  Pt is sexually active in a mutually monog/ sexual relationship; No issues with intercourse; She declines sti/hiv/hep testing; Feels safe at home    Condom use:  (+) PCP for routine Bw/care; Last Pap -   History of abnormal Pap smear:   Last mammo - 5/3/22 BIRADS 1   History of abnormal mammogram:   Last colonoscopy -   Last Dexa scan -     Review of Systems   Constitutional: Negative  Respiratory: Negative  Gastrointestinal: Negative  Endocrine: Negative  Genitourinary: Negative          The following portions of the patient's history were reviewed and updated as appropriate: allergies, current medications, past family history, past medical history, past social history, past surgical history and problem list          OB History        0    Para   0    Term   0       0    AB   0    Living   0       SAB   0    IAB   0    Ectopic   0    Multiple   0    Live Births   0                 Past Medical History:   Diagnosis Date    Achilles bursitis of left lower extremity     Anxiety     Asthma     Depression     Primary hypertension        Past Surgical History:   Procedure Laterality Date    TONSILLECTOMY         Family History   Problem Relation Age of Onset    Ulcerative colitis Mother     Hypertension Mother    [facility] Lung cancer Mother     Hypertension Father     Lung cancer Father     Lung cancer Family     No Known Problems Sister     No Known Problems Maternal Uncle     No Known Problems Paternal Aunt     No Known Problems Paternal Uncle     No Known Problems Maternal Grandfather     No Known Problems Paternal Grandmother     No Known Problems Paternal Grandfather     Breast cancer Other     ADD / ADHD Neg Hx     Anesthesia problems Neg Hx     Cancer Neg Hx     Clotting disorder Neg Hx     Collagen disease Neg Hx     Diabetes Neg Hx     Dislocations Neg Hx     Learning disabilities Neg Hx     Neurological problems Neg Hx     Osteoporosis Neg Hx     Rheumatologic disease Neg Hx     Scoliosis Neg Hx     Vascular Disease Neg Hx        Social History     Socioeconomic History    Marital status: Single     Spouse name: Not on file    Number of children: Not on file    Years of education: Not on file    Highest education level: Not on file   Occupational History    Not on file   Tobacco Use    Smoking status: Never Smoker    Smokeless tobacco: Never Used   Vaping Use    Vaping Use: Never used   Substance and Sexual Activity    Alcohol use: Yes     Comment: social     Drug use: No    Sexual activity: Not Currently   Other Topics Concern    Not on file   Social History Narrative    Not on file     Social Determinants of Health     Financial Resource Strain: Not on file   Food Insecurity: Not on file   Transportation Needs: Not on file   Physical Activity: Not on file   Stress: Not on file   Social Connections: Not on file   Intimate Partner Violence: Not on file   Housing Stability: Not on file         Current Outpatient Medications:     albuterol (PROVENTIL HFA,VENTOLIN HFA) 90 mcg/act inhaler, Inhale 2 puffs every 6 (six) hours as needed for wheezing, Disp: 8 g, Rfl: 1    amLODIPine (NORVASC) 5 mg tablet, Take 1 tablet (5 mg total) by mouth daily, Disp: 90 tablet, Rfl: 1    fluticasone (Flovent Diskus) 50 MCG/BLIST diskus inhaler, Inhale 1 puff 2 (two) times a day Rinse mouth after use , Disp: 60 blister, Rfl: 6    hydrochlorothiazide (HYDRODIURIL) 25 mg tablet, Take 1 tablet (25 mg total) by mouth daily, Disp: 90 tablet, Rfl: 1    montelukast (SINGULAIR) 10 mg tablet, Take 1 tablet (10 mg total) by mouth daily at bedtime, Disp: 90 tablet, Rfl: 1    Allergies   Allergen Reactions    Lisinopril Itching    Cephalexin Rash    Penicillins Rash       Objective   There were no vitals filed for this visit  Physical Exam  Constitutional:       Appearance: She is well-developed  HENT:      Head: Normocephalic and atraumatic  Cardiovascular:      Rate and Rhythm: Normal rate and regular rhythm  Pulmonary:      Effort: Pulmonary effort is normal       Breath sounds: Normal breath sounds  Chest:   Breasts: Breasts are symmetrical       Right: No inverted nipple, mass, nipple discharge, skin change or tenderness  Left: No inverted nipple, mass, nipple discharge, skin change or tenderness  Abdominal:      General: There is no distension  Palpations: Abdomen is soft  There is no mass  Tenderness: There is no guarding or rebound  Genitourinary:     Exam position: Supine  Labia:         Right: No rash  Left: No rash  Vagina: No signs of injury and foreign body  No vaginal discharge or erythema  Cervix: No cervical motion tenderness  Adnexa:         Right: No mass  Left: No mass  Rectum: Guaiac result negative  Skin:     General: Skin is warm and dry  Neurological:      Mental Status: She is alert and oriented to person, place, and time           There are no Patient Instructions on file for this visit

## 2022-05-10 ENCOUNTER — OFFICE VISIT (OUTPATIENT)
Dept: OBGYN CLINIC | Facility: CLINIC | Age: 47
End: 2022-05-10
Payer: COMMERCIAL

## 2022-05-10 ENCOUNTER — APPOINTMENT (OUTPATIENT)
Dept: LAB | Facility: CLINIC | Age: 47
End: 2022-05-10
Payer: COMMERCIAL

## 2022-05-10 VITALS — SYSTOLIC BLOOD PRESSURE: 118 MMHG | DIASTOLIC BLOOD PRESSURE: 78 MMHG | WEIGHT: 205 LBS | BODY MASS INDEX: 31.17 KG/M2

## 2022-05-10 DIAGNOSIS — R93.89 ABNORMAL ULTRASOUND OF PELVIS: ICD-10-CM

## 2022-05-10 DIAGNOSIS — N95.0 POSTMENOPAUSAL BLEEDING: ICD-10-CM

## 2022-05-10 DIAGNOSIS — N95.0 POSTMENOPAUSAL BLEEDING: Primary | ICD-10-CM

## 2022-05-10 LAB
FSH SERPL-ACNC: 71.9 MIU/ML
LH SERPL-ACNC: 30.8 MIU/ML

## 2022-05-10 PROCEDURE — 99203 OFFICE O/P NEW LOW 30 MIN: CPT | Performed by: PHYSICIAN ASSISTANT

## 2022-05-10 PROCEDURE — 87086 URINE CULTURE/COLONY COUNT: CPT | Performed by: FAMILY MEDICINE

## 2022-05-10 PROCEDURE — 83002 ASSAY OF GONADOTROPIN (LH): CPT

## 2022-05-10 PROCEDURE — 83001 ASSAY OF GONADOTROPIN (FSH): CPT

## 2022-05-10 PROCEDURE — 36415 COLL VENOUS BLD VENIPUNCTURE: CPT

## 2022-05-10 NOTE — PROGRESS NOTES
Assessment/Plan:    No problem-specific Assessment & Plan notes found for this encounter  Problem List Items Addressed This Visit     None      Visit Diagnoses     Postmenopausal bleeding    -  Primary    Relevant Orders    Follicle stimulating hormone    Luteinizing hormone    Abnormal ultrasound of pelvis        Relevant Orders    US pelvis complete w transvaginal          Will plan 600-800 mg of NSAIDS 30 minutes prior to procedure as well as 0 5 mg of Xanax  Subjective:      Patient ID: Brenda Whiting is a 51 y o  female  LMP - approx 2019ish  Never on HRT  Go  Pt is not sexually active and has not had a male partner in >15 years  She is a lesbian, but does not currently have a female partner either  Pt had no menses for years and then more recently has had some irregular sporadic bleeding  Sometimes will last a few days, sometimes will resolve in the days  Has noticed this since December  Will have light bleeding  Will have sporadic hot flashes as well daily  Pt had US done through her PCP in early April which showed:  1  Normal caliber endometrium (5mm is thickened in a  pt) but with possible small feeding vessel visualized, without measurable lesion  3 month ultrasound follow-up may be considered  2   Small nonspecific heterogeneous nodular region in the right ovary may also be reassessed on follow-up exam     We reviewed US results in depth  We reviewed possible etiologies for thickened EL  We did review recommendation to proceed with an endo biopsy next step  Pt is not prepared and has not medicated for an endo biopsy  Will plan to schedule when she has taken NSAIDS prior to and will plan a Xanax that she has at home for procedures, 0 5mg    Pt will also plan lab work to evaluate FSH/LH             The following portions of the patient's history were reviewed and updated as appropriate:   She  has a past medical history of Achilles bursitis of left lower extremity, Anxiety, Asthma, Depression, and Primary hypertension  She   Patient Active Problem List    Diagnosis Date Noted    Class 1 obesity due to excess calories with serious comorbidity and body mass index (BMI) of 31 0 to 31 9 in adult 05/05/2022    Encounter for screening mammogram for malignant neoplasm of breast 04/11/2022    Chronic left-sided low back pain with right-sided sciatica 04/11/2022    Primary hypertension 11/15/2021    Achilles bursitis of left lower extremity 11/01/2018    Asthma 11/25/2016     She  has a past surgical history that includes Tonsillectomy  Her family history includes Breast cancer in her other; Hypertension in her father and mother; Lung cancer in her family, father, and mother; No Known Problems in her maternal grandfather, maternal uncle, paternal aunt, paternal grandfather, paternal grandmother, paternal uncle, and sister; Ulcerative colitis in her mother  She  reports that she has never smoked  She has never used smokeless tobacco  She reports current alcohol use  She reports that she does not use drugs  Current Outpatient Medications   Medication Sig Dispense Refill    albuterol (PROVENTIL HFA,VENTOLIN HFA) 90 mcg/act inhaler Inhale 2 puffs every 6 (six) hours as needed for wheezing 8 g 1    amLODIPine (NORVASC) 5 mg tablet Take 1 tablet (5 mg total) by mouth daily 90 tablet 1    fluticasone (Flovent Diskus) 50 MCG/BLIST diskus inhaler Inhale 1 puff 2 (two) times a day Rinse mouth after use  60 blister 6    hydrochlorothiazide (HYDRODIURIL) 25 mg tablet Take 1 tablet (25 mg total) by mouth daily 90 tablet 1    montelukast (SINGULAIR) 10 mg tablet Take 1 tablet (10 mg total) by mouth daily at bedtime 90 tablet 1     No current facility-administered medications for this visit       Current Outpatient Medications on File Prior to Visit   Medication Sig    albuterol (PROVENTIL HFA,VENTOLIN HFA) 90 mcg/act inhaler Inhale 2 puffs every 6 (six) hours as needed for wheezing  amLODIPine (NORVASC) 5 mg tablet Take 1 tablet (5 mg total) by mouth daily    fluticasone (Flovent Diskus) 50 MCG/BLIST diskus inhaler Inhale 1 puff 2 (two) times a day Rinse mouth after use   hydrochlorothiazide (HYDRODIURIL) 25 mg tablet Take 1 tablet (25 mg total) by mouth daily    montelukast (SINGULAIR) 10 mg tablet Take 1 tablet (10 mg total) by mouth daily at bedtime     No current facility-administered medications on file prior to visit  She is allergic to lisinopril, cephalexin, and penicillins       Review of Systems   Constitutional: Negative  Genitourinary: Positive for menstrual problem  Negative for pelvic pain  Musculoskeletal: Positive for back pain  Psychiatric/Behavioral: Negative  Objective:      /78 (BP Location: Left arm, Patient Position: Sitting, Cuff Size: Large)   Wt 93 kg (205 lb)   BMI 31 17 kg/m²          Physical Exam  Constitutional:       Appearance: She is obese  HENT:      Head: Normocephalic and atraumatic  Neurological:      Mental Status: She is alert  Psychiatric:         Mood and Affect: Mood normal          Behavior: Behavior normal          Thought Content:  Thought content normal          Judgment: Judgment normal

## 2022-05-12 LAB — BACTERIA UR CULT: NORMAL

## 2022-05-23 ENCOUNTER — OFFICE VISIT (OUTPATIENT)
Dept: FAMILY MEDICINE CLINIC | Facility: CLINIC | Age: 47
End: 2022-05-23
Payer: COMMERCIAL

## 2022-05-23 VITALS
WEIGHT: 197 LBS | SYSTOLIC BLOOD PRESSURE: 134 MMHG | BODY MASS INDEX: 29.86 KG/M2 | RESPIRATION RATE: 16 BRPM | DIASTOLIC BLOOD PRESSURE: 90 MMHG | HEIGHT: 68 IN | TEMPERATURE: 97.7 F | HEART RATE: 72 BPM

## 2022-05-23 DIAGNOSIS — R09.81 SINUS CONGESTION: Primary | ICD-10-CM

## 2022-05-23 PROCEDURE — 99213 OFFICE O/P EST LOW 20 MIN: CPT | Performed by: FAMILY MEDICINE

## 2022-05-23 PROCEDURE — 3008F BODY MASS INDEX DOCD: CPT | Performed by: FAMILY MEDICINE

## 2022-05-23 PROCEDURE — 3080F DIAST BP >= 90 MM HG: CPT | Performed by: FAMILY MEDICINE

## 2022-05-23 PROCEDURE — 1036F TOBACCO NON-USER: CPT | Performed by: FAMILY MEDICINE

## 2022-05-23 PROCEDURE — 3075F SYST BP GE 130 - 139MM HG: CPT | Performed by: FAMILY MEDICINE

## 2022-05-23 RX ORDER — AZITHROMYCIN 250 MG/1
TABLET, FILM COATED ORAL
Qty: 6 TABLET | Refills: 0 | Status: SHIPPED | OUTPATIENT
Start: 2022-05-23 | End: 2022-05-27

## 2022-05-23 NOTE — PROGRESS NOTES
Chief Complaint   Patient presents with    Sinus Problem        Patient ID: Sherrie Bashir is a 51 y o  female  Sinusitis  This is a new problem  The current episode started in the past 7 days  The problem has been gradually improving since onset  There has been no fever  Her pain is at a severity of 3/10  The pain is mild  Associated symptoms include congestion and sinus pressure  Pertinent negatives include no chills, coughing, diaphoresis, ear pain, headaches, hoarse voice, neck pain, shortness of breath, sneezing or sore throat  Past treatments include nothing  The treatment provided no relief    negative home COVID test x 3       The following portions of the patient's history were reviewed and updated as appropriate: allergies, current medications, past family history, past medical history, past social history, past surgical history and problem list     Review of Systems   Constitutional: Negative for chills and diaphoresis  HENT: Positive for congestion, postnasal drip and sinus pressure  Negative for ear pain, hoarse voice, sneezing and sore throat  Respiratory: Negative for cough and shortness of breath  Gastrointestinal: Negative  Musculoskeletal: Negative for neck pain  Neurological: Negative for headaches  Current Outpatient Medications   Medication Sig Dispense Refill    albuterol (PROVENTIL HFA,VENTOLIN HFA) 90 mcg/act inhaler Inhale 2 puffs every 6 (six) hours as needed for wheezing 8 g 1    amLODIPine (NORVASC) 5 mg tablet Take 1 tablet (5 mg total) by mouth daily 90 tablet 1    fluticasone (Flovent Diskus) 50 MCG/BLIST diskus inhaler Inhale 1 puff 2 (two) times a day Rinse mouth after use   60 blister 6    hydrochlorothiazide (HYDRODIURIL) 25 mg tablet Take 1 tablet (25 mg total) by mouth daily 90 tablet 1    montelukast (SINGULAIR) 10 mg tablet Take 1 tablet (10 mg total) by mouth daily at bedtime 90 tablet 1     No current facility-administered medications for this visit  Objective:    /90   Pulse 72   Temp 97 7 °F (36 5 °C)   Resp 16   Ht 5' 8" (1 727 m)   Wt 89 4 kg (197 lb)   BMI 29 95 kg/m²        Physical Exam  Constitutional:       Appearance: She is not ill-appearing  HENT:      Right Ear: Tympanic membrane normal       Left Ear: Tympanic membrane normal       Nose: Congestion present  No rhinorrhea  Mouth/Throat:      Pharynx: No oropharyngeal exudate or posterior oropharyngeal erythema  Cardiovascular:      Rate and Rhythm: Normal rate  Pulmonary:      Effort: Pulmonary effort is normal  No respiratory distress  Breath sounds: No wheezing, rhonchi or rales  Lymphadenopathy:      Cervical: Cervical adenopathy present  Right cervical: Superficial cervical adenopathy present  Neurological:      Mental Status: She is alert  Assessment/Plan:         Diagnoses and all orders for this visit:    Sinus congestion  -     azithromycin (ZITHROMAX) 250 mg tablet;  Take 2 tablets today then 1 tablet daily x 4 days          rto prn                 Cresencio Bravo MD

## 2022-06-14 ENCOUNTER — PROCEDURE VISIT (OUTPATIENT)
Dept: OBGYN CLINIC | Facility: CLINIC | Age: 47
End: 2022-06-14
Payer: COMMERCIAL

## 2022-06-14 VITALS — SYSTOLIC BLOOD PRESSURE: 122 MMHG | BODY MASS INDEX: 29.19 KG/M2 | DIASTOLIC BLOOD PRESSURE: 78 MMHG | WEIGHT: 192 LBS

## 2022-06-14 DIAGNOSIS — Z12.4 ENCOUNTER FOR PAPANICOLAOU SMEAR FOR CERVICAL CANCER SCREENING: ICD-10-CM

## 2022-06-14 DIAGNOSIS — N95.0 POSTMENOPAUSAL BLEEDING: Primary | ICD-10-CM

## 2022-06-14 PROCEDURE — 88305 TISSUE EXAM BY PATHOLOGIST: CPT | Performed by: PATHOLOGY

## 2022-06-14 PROCEDURE — G0476 HPV COMBO ASSAY CA SCREEN: HCPCS | Performed by: NURSE PRACTITIONER

## 2022-06-14 PROCEDURE — 58100 BIOPSY OF UTERUS LINING: CPT | Performed by: NURSE PRACTITIONER

## 2022-06-14 PROCEDURE — G0143 SCR C/V CYTO,THINLAYER,RESCR: HCPCS | Performed by: NURSE PRACTITIONER

## 2022-06-14 NOTE — PROGRESS NOTES
Endometrial biopsy    Date/Time: 6/14/2022 1:30 PM  Performed by: KEVIN Briceno Aas  Authorized by: Prakash Stein MD   Universal Protocol:  Consent: Verbal consent obtained  Written consent obtained  Risks and benefits: risks, benefits and alternatives were discussed  Consent given by: patient  Time out: Immediately prior to procedure a "time out" was called to verify the correct patient, procedure, equipment, support staff and site/side marked as required  Timeout called at: 6/14/2022 1:30 PM   Patient understanding: patient states understanding of the procedure being performed  Patient consent: the patient's understanding of the procedure matches consent given  Procedure consent: procedure consent matches procedure scheduled  Relevant documents: relevant documents present and verified  Test results: test results available and properly labeled  Site marked: the operative site was not marked  Radiology Images displayed and confirmed   If images not available, report reviewed: imaging studies available  Required items: required blood products, implants, devices, and special equipment available  Patient identity confirmed: verbally with patient      Indication:     Indications: Post-menopausal bleeding      Chronicity of post-menopausal bleeding:  New    Progression of post-menopausal bleeding:  Resolved  Pre-procedure:     Premeds:  Ibuprofen (Xanax)  Procedure:     Procedure: endometrial biopsy with Pipelle      A bivalve speculum was placed in the vagina: yes      Cervix cleaned and prepped: yes      A paracervical block was performed: no      An intracervical block was performed: no      The cervix was dilated: yes      Uterus sounded: yes      Uterus sound depth (cm):  7    Specimen collected: specimen collected and sent to pathology      Patient tolerated procedure well with no complications: yes    Findings:     Uterus size:  6-8 weeks    Cervix: normal      Adnexa: normal    Comments: Procedure comments:  Cervical polyp noted prior to procedure, reviewed polyp with patient after procedure recommend return for polypectomy, discussed polyp as potential cause of bleeding  Pap collected prior to procedure    No intercourse, tampon use, soaking in bath tub, or swimming for the next 3 days to avoid risk of infection  Call office with excessive bleeding, cramping, fever, or chills  Office will call with results and appropriate follow up

## 2022-06-15 LAB
HPV HR 12 DNA CVX QL NAA+PROBE: NEGATIVE
HPV16 DNA CVX QL NAA+PROBE: NEGATIVE
HPV18 DNA CVX QL NAA+PROBE: NEGATIVE

## 2022-06-17 ENCOUNTER — TELEPHONE (OUTPATIENT)
Dept: OBGYN CLINIC | Facility: CLINIC | Age: 47
End: 2022-06-17

## 2022-06-17 NOTE — TELEPHONE ENCOUNTER
Per provider documentation pt to need possible polypectomy and to review results of procedure from 6/14, which are still in process at this time, and will notify pt with appropriate f/u recommendations once final results reviewed  Pt called and states she was calling to make apt for polypectomy and aware results pending for endo biopsy  Pt aware provider will review results once completed and will call pt back to schedule appropriately, as pt wishes to schedule polypectomy regardless of biopsy results

## 2022-06-19 LAB
LAB AP GYN PRIMARY INTERPRETATION: NORMAL
Lab: NORMAL

## 2022-07-05 DIAGNOSIS — R00.9 ELEVATED HEART RATE WITH ELEVATED BLOOD PRESSURE AND DIAGNOSIS OF HYPERTENSION: Primary | ICD-10-CM

## 2022-07-05 DIAGNOSIS — I10 ELEVATED HEART RATE WITH ELEVATED BLOOD PRESSURE AND DIAGNOSIS OF HYPERTENSION: Primary | ICD-10-CM

## 2022-07-12 ENCOUNTER — CONSULT (OUTPATIENT)
Dept: PULMONOLOGY | Facility: MEDICAL CENTER | Age: 47
End: 2022-07-12
Payer: COMMERCIAL

## 2022-07-12 VITALS
DIASTOLIC BLOOD PRESSURE: 74 MMHG | WEIGHT: 201 LBS | HEART RATE: 63 BPM | TEMPERATURE: 98.8 F | HEIGHT: 68 IN | OXYGEN SATURATION: 97 % | SYSTOLIC BLOOD PRESSURE: 124 MMHG | BODY MASS INDEX: 30.46 KG/M2 | RESPIRATION RATE: 12 BRPM

## 2022-07-12 DIAGNOSIS — J45.40 MODERATE PERSISTENT ASTHMA WITHOUT COMPLICATION: ICD-10-CM

## 2022-07-12 DIAGNOSIS — G47.30 SLEEP DISORDER BREATHING: ICD-10-CM

## 2022-07-12 DIAGNOSIS — G47.33 OSA (OBSTRUCTIVE SLEEP APNEA): Primary | ICD-10-CM

## 2022-07-12 PROCEDURE — 99203 OFFICE O/P NEW LOW 30 MIN: CPT | Performed by: INTERNAL MEDICINE

## 2022-07-12 NOTE — ASSESSMENT & PLAN NOTE
Patient is a 27-year-old female with a medical history significant for hypertension and moderate persistent asthma, who presents with signs and symptoms suggest of ASHLEY; patient states that she has snored of majority of her life, and has been experiencing associated symptoms of morning headaches, daytime fatigue, irritability and other cognitive deficits  She states that she had undergone ASHLEY evaluation many years ago, however she was not diagnosed with ASHLEY at that time  She has not at work sleep score of 14  Will obtain home sleep study

## 2022-07-12 NOTE — PROGRESS NOTES
Assessment/Plan:    ASHLEY (obstructive sleep apnea)  Patient is a 58-year-old female with a medical history significant for hypertension and moderate persistent asthma, who presents with signs and symptoms suggest of ASHLEY; patient states that she has snored of majority of her life, and has been experiencing associated symptoms of morning headaches, daytime fatigue, irritability and other cognitive deficits  She states that she had undergone ASHLEY evaluation many years ago, however she was not diagnosed with ASHLEY at that time  She has not at work sleep score of 14  Will obtain home sleep study  Asthma  Continue albuterol p r n , montelukast and fluticasone  Diagnoses and all orders for this visit:    ASHLEY (obstructive sleep apnea)  -     Home Study; Future    Sleep disorder breathing  -     Ambulatory Referral to Pulmonology  -     Ambulatory Referral to Sleep Medicine  -     Home Study; Future    Moderate persistent asthma without complication          Subjective:      Patient ID: Gilma Blackwell is a 51 y o  female  Ms Grace Vidal presents for an initial consultation for sleep cycle disturbances  She believes that she has sleep apnea  She reports her primary trigger for undergoing evaluation is hypertensive issues, that she has been dealing with for about 9-10 months  She states that she had undergone a sleep study over 20 years ago, which was unremarkable for sleep apnea  She states that, since then, she has gained approximately 50 lbs, with a significant gain over the past 5-6 years, which she attributes to perimenopause  She states that she has been snoring from as long as she can remember, and also has been experiencing occasional morning headaches, daytime fatigue, and cognitive/memory changes, predominantly in the early to mid morning   She reports difficulty sleeping supine due to shortness of breath, and has to lay on her side to alleviate this, howeverShe denies any RLS, hypogognic/hypnopompic hallucinations, or sleep paralysis  She denies a history of tobacco use  She had a previous hospitalization in November 2021 during a hypertensive episode,  and was diagnosed with an arrhythmia at that time; she reports that since then, she has been asymptomatic and has been in sinus rhythm as interpreted by her Fitbit  She denies any GIBSON, or significant leg swelling  Sleep Cycle: She goes to bed around 11PM, but this varies according to her workday- she works as a   She reports that she is able to fall asleep "within minutes", however, she often wakes up between 1-3AM, and has to use the restroom  She concludes her sleep cycle aroudn 5:30-6AM     She has no recent sleep studies  The following portions of the patient's history were reviewed and updated as appropriate: allergies, current medications, past family history, past medical history, past social history, past surgical history and problem list     Review of Systems   Constitutional: Negative for chills and fever  HENT: Negative for ear pain and sore throat  Eyes: Negative for pain and visual disturbance  Respiratory: Positive for shortness of breath  Negative for cough  Cardiovascular: Negative for chest pain and palpitations  Gastrointestinal: Negative for abdominal pain and vomiting  Genitourinary: Negative for dysuria and hematuria  Musculoskeletal: Negative for arthralgias and back pain  Skin: Negative for color change and rash  Neurological: Positive for headaches  Negative for seizures and syncope  Psychiatric/Behavioral: Positive for decreased concentration and sleep disturbance  Negative for agitation and behavioral problems  All other systems reviewed and are negative          Objective:      /74 (BP Location: Left arm, Patient Position: Sitting, Cuff Size: Large)   Pulse 63   Temp 98 8 °F (37 1 °C) (Temporal)   Resp 12   Ht 5' 8" (1 727 m)   Wt 91 2 kg (201 lb)   SpO2 97%   BMI 30 56 kg/m² Physical Exam  Vitals reviewed  Constitutional:       Appearance: Normal appearance  HENT:      Head: Normocephalic and atraumatic  Mouth/Throat:      Mouth: Mucous membranes are moist    Eyes:      General:         Right eye: No discharge  Left eye: No discharge  Conjunctiva/sclera: Conjunctivae normal    Cardiovascular:      Rate and Rhythm: Normal rate and regular rhythm  Heart sounds: S1 normal and S2 normal  No murmur heard  No friction rub  No gallop  Pulmonary:      Effort: Pulmonary effort is normal       Breath sounds: Normal breath sounds  Abdominal:      Palpations: Abdomen is soft  Musculoskeletal:         General: No swelling or tenderness  Cervical back: Neck supple  Right lower leg: No edema  Left lower leg: No edema  Skin:     General: Skin is warm and dry  Neurological:      Mental Status: She is alert and oriented to person, place, and time     Psychiatric:         Mood and Affect: Mood normal          Behavior: Behavior normal

## 2022-07-14 ENCOUNTER — HOSPITAL ENCOUNTER (OUTPATIENT)
Dept: RADIOLOGY | Facility: HOSPITAL | Age: 47
Discharge: HOME | End: 2022-07-14
Payer: COMMERCIAL

## 2022-07-14 DIAGNOSIS — R93.89 ABNORMAL ULTRASOUND OF PELVIS: ICD-10-CM

## 2022-07-14 PROCEDURE — 76830 TRANSVAGINAL US NON-OB: CPT

## 2022-07-14 PROCEDURE — 76856 US EXAM PELVIC COMPLETE: CPT

## 2022-07-18 ENCOUNTER — CONSULT (OUTPATIENT)
Dept: CARDIOLOGY CLINIC | Facility: CLINIC | Age: 47
End: 2022-07-18
Payer: COMMERCIAL

## 2022-07-18 VITALS
HEIGHT: 68 IN | DIASTOLIC BLOOD PRESSURE: 76 MMHG | OXYGEN SATURATION: 96 % | HEART RATE: 60 BPM | WEIGHT: 197 LBS | TEMPERATURE: 97.8 F | BODY MASS INDEX: 29.86 KG/M2 | SYSTOLIC BLOOD PRESSURE: 122 MMHG

## 2022-07-18 DIAGNOSIS — I10 ELEVATED HEART RATE WITH ELEVATED BLOOD PRESSURE AND DIAGNOSIS OF HYPERTENSION: Primary | ICD-10-CM

## 2022-07-18 DIAGNOSIS — R00.9 ELEVATED HEART RATE WITH ELEVATED BLOOD PRESSURE AND DIAGNOSIS OF HYPERTENSION: Primary | ICD-10-CM

## 2022-07-18 PROCEDURE — 3074F SYST BP LT 130 MM HG: CPT | Performed by: INTERNAL MEDICINE

## 2022-07-18 PROCEDURE — 3078F DIAST BP <80 MM HG: CPT | Performed by: INTERNAL MEDICINE

## 2022-07-18 PROCEDURE — 99243 OFF/OP CNSLTJ NEW/EST LOW 30: CPT | Performed by: INTERNAL MEDICINE

## 2022-07-18 PROCEDURE — 93000 ELECTROCARDIOGRAM COMPLETE: CPT | Performed by: INTERNAL MEDICINE

## 2022-07-18 NOTE — PROGRESS NOTES
Consultation - Cardiology Office  [facility]  Gage Lafleur 51 y o  female MRN: [id number]  : 1975  Unit/Bed#:  Encounter: [id number]      ASSESSMENT:  Hypertension  BP today is 122/76 mmHg with heart rate of 60 per minute    Occasional rapid heart rate according to patient's watch juana    Asthma    Obesity, BMI 29 95    Snoring/excessive daytime somnolence  Rule out sleep apnea  Scheduled for sleep study        RECOMMENDATIONS:  Echocardiogram  48 hour Holter monitor  Continue amlodipine and hydrochlorothiazide  Patient already scheduled for sleep study  Low-salt diet, regular cardiovascular exercise and weight loss          Thank you for your consultation  If you have any question please call me at [phone]      Primary Care Physician Requesting Consult: Hue Gonzalez MD      Reason for Consult / Principal Problem:  Hypertension        HPI :     Gage Lafleur is a 51y o  year old female who was referred by primary care doctor for cardiac evaluation  She was in the ED in November with elevated blood pressure and some palpitations/rapid heartbeat  Her blood pressure is now well controlled on medications  Patient has an Apple watch juana which tells her that she has high heart rate variability for which she requests further evaluation  She denies any chest pain or unusual dyspnea  She has lost some weight by being on low carb diet and a blood pressure is better controlled      Review of Systems   Cardiovascular: Positive for palpitations  All other systems reviewed and are negative        Historical Information   Past Medical History:   Diagnosis Date    Achilles bursitis of left lower extremity     Anxiety     Asthma     Depression     Primary hypertension      Past Surgical History:   Procedure Laterality Date    TONSILLECTOMY       Social History     Substance and Sexual Activity   Alcohol Use Yes    Comment: social      Social History     Substance and Sexual Activity   Drug Use No     Social History     Tobacco Use   Smoking Status Never Smoker   Smokeless Tobacco Never Used     Family History:   Family History   Problem Relation Age of Onset    Ulcerative colitis Mother     Hypertension Mother    [facility] Mother     Hypertension Father     Lung cancer Father     Lung cancer Family     No Known Problems Sister     No Known Problems Maternal Uncle     No Known Problems Paternal Aunt     No Known Problems Paternal Uncle     No Known Problems Maternal Grandfather     No Known Problems Paternal Grandmother     No Known Problems Paternal Grandfather     Breast cancer Other     ADD / ADHD Neg Hx     Anesthesia problems Neg Hx     Cancer Neg Hx     Clotting disorder Neg Hx     Collagen disease Neg Hx     Diabetes Neg Hx     Dislocations Neg Hx     Learning disabilities Neg Hx     Neurological problems Neg Hx     Osteoporosis Neg Hx     Rheumatologic disease Neg Hx     Scoliosis Neg Hx     Vascular Disease Neg Hx        Meds/Allergies     Allergies   Allergen Reactions    Lisinopril Itching    Cephalexin Rash    Penicillins Rash       Current Outpatient Medications:     amLODIPine (NORVASC) 5 mg tablet, Take 1 tablet (5 mg total) by mouth daily, Disp: 90 tablet, Rfl: 1    hydrochlorothiazide (HYDRODIURIL) 25 mg tablet, Take 1 tablet (25 mg total) by mouth daily, Disp: 90 tablet, Rfl: 1    albuterol (PROVENTIL HFA,VENTOLIN HFA) 90 mcg/act inhaler, Inhale 2 puffs every 6 (six) hours as needed for wheezing (Patient not taking: No sig reported), Disp: 8 g, Rfl: 1    fluticasone (Flovent Diskus) 50 MCG/BLIST diskus inhaler, Inhale 1 puff 2 (two) times a day Rinse mouth after use , Disp: 60 blister, Rfl: 6    montelukast (SINGULAIR) 10 mg tablet, Take 1 tablet (10 mg total) by mouth daily at bedtime (Patient not taking: No sig reported), Disp: 90 tablet, Rfl: 1    Vitals: Blood pressure 122/76, pulse 60, temperature 97 8 °F (36 6 °C), height 5' 8" (1 727 m), weight 89 4 kg (197 lb), SpO2 96 %  ?  Body mass index is 29 95 kg/m²  Vitals:    07/18/22 0844   Weight: 89 4 kg (197 lb)     BP Readings from Last 3 Encounters:   07/18/22 122/76   07/12/22 124/74   06/14/22 122/78       PHYSICAL EXAMINATION:  Neurologic:  Alert & oriented x 3, no new focal deficits, Not in any acute distress,  Constitutional:  Well developed, well nourished, non-toxic appearance   Eyes:  Pupil equal and reacting to light, conjunctiva normal, No JVP, No LNP   HENT:  Atraumatic, oropharynx moist, Neck- normal range of motion, no tenderness,  Neck supple   Respiratory:  Bilateral air entry, mostly clear to auscultation  Cardiovascular: S1-S2 regular with a I/VI systolic murmur   GI:  Soft, nondistended, normal bowel sounds, nontender, no hepatosplenomegaly appreciated  Musculoskeletal: no tenderness, no deformities  Skin:  Well hydrated, no rash   Lymphatic:  No lymphadenopathy noted   Extremities:  No edema and distal pulses are present    Diagnostic Studies Review Cardio:      EKG:  Normal sinus rhythm, heart rate 60 per minute, nonspecific T-wave abnormality    Cardiac testing:   No results found for this or any previous visit  Imaging:  Chest X-Ray:   XR chest 2 views    Result Date: 10/15/2021  Impression No acute cardiopulmonary disease  Findings are stable Workstation performed: [id number]       CT-scan of the chest:     No CTA results available for this patient    Lab Review   Lab Results   Component Value Date    WBC 10 23 (H) 11/28/2021    HGB 13 2 11/28/2021    HCT 41 1 11/28/2021    MCV 94 11/28/2021    RDW 12 8 11/28/2021     11/28/2021     BMP:  Lab Results   Component Value Date    SODIUM 140 11/28/2021    K 3 7 11/28/2021     11/28/2021    CO2 28 11/28/2021    BUN 26 (H) 11/28/2021    CREATININE 1 01 11/28/2021    GLUC 97 11/28/2021    CALCIUM 9 6 11/28/2021    EGFR 66 11/28/2021     LFT:  Lab Results   Component Value Date    AST 28 11/28/2021    ALT 40 11/28/2021    ALKPHOS 67 11/28/2021    TP 7 8 11/28/2021    ALB 4 3 11/28/2021      No results found for: [id number]  No components found for: TSH3  No results found for: HGBA1C  Lipid Profile:   No results found for: CHOLESTEROL, HDL, LDLCALC, TRIG  No results found for: CHOLESTEROL  No results found for: CKTOTAL, CKMB, CKMBINDEX, TROPONINI  No results found for: NTBNP   No results found for this or any previous visit (from the past 672 hour(s))  Dr Trav Durham MD, [facility] [place]      "This note has been constructed using a voice recognition system  Therefore there may be syntax, spelling, and/or grammatical errors   Please call if you have any questions  "

## 2022-07-27 ENCOUNTER — OFFICE VISIT (OUTPATIENT)
Dept: FAMILY MEDICINE CLINIC | Facility: CLINIC | Age: 47
End: 2022-07-27
Payer: COMMERCIAL

## 2022-07-27 VITALS
WEIGHT: 194 LBS | SYSTOLIC BLOOD PRESSURE: 120 MMHG | HEART RATE: 66 BPM | BODY MASS INDEX: 29.4 KG/M2 | HEIGHT: 68 IN | RESPIRATION RATE: 14 BRPM | DIASTOLIC BLOOD PRESSURE: 80 MMHG | TEMPERATURE: 98 F

## 2022-07-27 DIAGNOSIS — I10 PRIMARY HYPERTENSION: Primary | ICD-10-CM

## 2022-07-27 DIAGNOSIS — M54.41 CHRONIC LEFT-SIDED LOW BACK PAIN WITH RIGHT-SIDED SCIATICA: ICD-10-CM

## 2022-07-27 DIAGNOSIS — G89.29 CHRONIC LEFT-SIDED LOW BACK PAIN WITH RIGHT-SIDED SCIATICA: ICD-10-CM

## 2022-07-27 PROCEDURE — 99214 OFFICE O/P EST MOD 30 MIN: CPT | Performed by: FAMILY MEDICINE

## 2022-07-27 RX ORDER — GABAPENTIN 300 MG/1
300 CAPSULE ORAL
Qty: 30 CAPSULE | Refills: 6 | Status: SHIPPED | OUTPATIENT
Start: 2022-07-27 | End: 2022-10-25 | Stop reason: SDUPTHER

## 2022-07-27 NOTE — PROGRESS NOTES
Chief Complaint   Patient presents with    Back Pain    Blood Pressure Check        Patient ID: Nain Maldonado is a 51 y o  female  HPI  Pt is seeing for f/u HTN and chronic low back pain that is worsening at night and interferes with sleep     The following portions of the patient's history were reviewed and updated as appropriate: allergies, current medications, past family history, past medical history, past social history, past surgical history and problem list     Review of Systems   Constitutional: Negative  Respiratory: Negative  Cardiovascular: Negative  Gastrointestinal: Negative  Genitourinary: Negative  Musculoskeletal: Positive for back pain  Skin: Negative  Neurological: Negative  Current Outpatient Medications   Medication Sig Dispense Refill    albuterol (PROVENTIL HFA,VENTOLIN HFA) 90 mcg/act inhaler Inhale 2 puffs every 6 (six) hours as needed for wheezing 8 g 1    amLODIPine (NORVASC) 5 mg tablet Take 1 tablet (5 mg total) by mouth daily 90 tablet 1    gabapentin (Neurontin) 300 mg capsule Take 1 capsule (300 mg total) by mouth daily at bedtime 30 capsule 6    hydrochlorothiazide (HYDRODIURIL) 25 mg tablet Take 1 tablet (25 mg total) by mouth daily 90 tablet 1    fluticasone (Flovent Diskus) 50 MCG/BLIST diskus inhaler Inhale 1 puff 2 (two) times a day Rinse mouth after use  60 blister 6    montelukast (SINGULAIR) 10 mg tablet Take 1 tablet (10 mg total) by mouth daily at bedtime (Patient not taking: No sig reported) 90 tablet 1     No current facility-administered medications for this visit  Objective:    /80 (BP Location: Left arm, Patient Position: Sitting, Cuff Size: Adult)   Pulse 66   Temp 98 °F (36 7 °C) (Temporal)   Resp 14   Ht 5' 8" (1 727 m)   Wt 88 kg (194 lb)   BMI 29 50 kg/m²        Physical Exam  Constitutional:       Appearance: She is not ill-appearing  Cardiovascular:      Rate and Rhythm: Normal rate and regular rhythm  Pulmonary:      Effort: Pulmonary effort is normal  No respiratory distress  Breath sounds: No wheezing or rales  Musculoskeletal:      Right lower leg: No edema  Left lower leg: No edema  Neurological:      General: No focal deficit present  Mental Status: She is alert and oriented to person, place, and time  Labs in chart were reviewed  Assessment/Plan:         Diagnoses and all orders for this visit:    Primary hypertension  -     Comprehensive metabolic panel; Future  -     Lipid panel; Future  -     TSH, 3rd generation; Future    Chronic left-sided low back pain with right-sided sciatica  -     gabapentin (Neurontin) 300 mg capsule;  Take 1 capsule (300 mg total) by mouth daily at bedtime      phone f/u in 1 m       rto in 6 m                 Cathy Dior MD

## 2022-08-25 ENCOUNTER — HOSPITAL ENCOUNTER (OUTPATIENT)
Dept: NON INVASIVE DIAGNOSTICS | Facility: HOSPITAL | Age: 47
Discharge: HOME | End: 2022-08-25
Attending: INTERNAL MEDICINE
Payer: COMMERCIAL

## 2022-08-25 VITALS
HEART RATE: 58 BPM | BODY MASS INDEX: 29.4 KG/M2 | HEIGHT: 68 IN | WEIGHT: 194 LBS | SYSTOLIC BLOOD PRESSURE: 144 MMHG | DIASTOLIC BLOOD PRESSURE: 87 MMHG

## 2022-08-25 DIAGNOSIS — I10 ELEVATED HEART RATE WITH ELEVATED BLOOD PRESSURE AND DIAGNOSIS OF HYPERTENSION: ICD-10-CM

## 2022-08-25 DIAGNOSIS — R00.9 ELEVATED HEART RATE WITH ELEVATED BLOOD PRESSURE AND DIAGNOSIS OF HYPERTENSION: ICD-10-CM

## 2022-08-25 LAB
AORTIC ROOT: 3 CM
APICAL FOUR CHAMBER EJECTION FRACTION: 70 %
ASCENDING AORTA: 3.4 CM
DOP CALC LVOT AREA: 2.83 CM2
DOP CALC LVOT DIAMETER: 1.9 CM
E WAVE DECELERATION TIME: 153 MS
FRACTIONAL SHORTENING: 29 % (ref 28–44)
INTERVENTRICULAR SEPTUM IN DIASTOLE (PARASTERNAL SHORT AXIS VIEW): 0.8 CM
INTERVENTRICULAR SEPTUM: 0.8 CM (ref 0.6–1.1)
LAAS-AP2: 29.1 CM2
LAAS-AP4: 19.8 CM2
LEFT ATRIUM SIZE: 4.2 CM
LEFT INTERNAL DIMENSION IN SYSTOLE: 3.7 CM (ref 2.1–4)
LEFT VENTRICULAR INTERNAL DIMENSION IN DIASTOLE: 5.2 CM (ref 3.5–6)
LEFT VENTRICULAR POSTERIOR WALL IN END DIASTOLE: 0.9 CM
LEFT VENTRICULAR STROKE VOLUME: 72 ML
LVSV (TEICH): 72 ML
MV E'TISSUE VEL-SEP: 10 CM/S
MV PEAK A VEL: 0.83 M/S
MV PEAK E VEL: 78 CM/S
RIGHT ATRIUM AREA SYSTOLE A4C: 12.1 CM2
RIGHT VENTRICLE ID DIMENSION: 3 CM
SL CV LEFT ATRIUM LENGTH A2C: 6.2 CM
SL CV PED ECHO LEFT VENTRICLE DIASTOLIC VOLUME (MOD BIPLANE) 2D: 129 ML
SL CV PED ECHO LEFT VENTRICLE SYSTOLIC VOLUME (MOD BIPLANE) 2D: 57 ML
TR MAX PG: 24 MMHG
TR PEAK VELOCITY: 2.5 M/S
TRICUSPID VALVE PEAK REGURGITATION VELOCITY: 2.47 M/S

## 2022-08-25 PROCEDURE — 93225 XTRNL ECG REC<48 HRS REC: CPT

## 2022-08-25 PROCEDURE — 93306 TTE W/DOPPLER COMPLETE: CPT | Performed by: INTERNAL MEDICINE

## 2022-08-25 PROCEDURE — 93226 XTRNL ECG REC<48 HR SCAN A/R: CPT

## 2022-08-25 PROCEDURE — 93306 TTE W/DOPPLER COMPLETE: CPT

## 2022-08-30 PROCEDURE — 93227 XTRNL ECG REC<48 HR R&I: CPT | Performed by: INTERNAL MEDICINE

## 2022-10-19 ENCOUNTER — HOSPITAL ENCOUNTER (OUTPATIENT)
Dept: SLEEP CENTER | Facility: CLINIC | Age: 47
Discharge: HOME | End: 2022-10-19
Payer: COMMERCIAL

## 2022-10-19 DIAGNOSIS — G47.30 SLEEP DISORDER BREATHING: ICD-10-CM

## 2022-10-19 DIAGNOSIS — G47.33 OSA (OBSTRUCTIVE SLEEP APNEA): ICD-10-CM

## 2022-10-19 PROCEDURE — G0399 HOME SLEEP TEST/TYPE 3 PORTA: HCPCS

## 2022-10-22 NOTE — PROGRESS NOTES
Home Sleep Study Documentation    HOME STUDY DEVICE: Noxturnal no                                           Cayla G3 yes      Pre-Sleep Home Study:    Set-up and instructions performed by: Nikhil Stephenson performed demonstration for Patient: yes    Return demonstration performed by Patient: yes    Written instructions provided to Patient: yes    Patient signed consent form: yes        Post-Sleep Home Study:    Additional comments by Patient: Slept on right side until about 5 am, then slept on my back  I breathe through my mouth  Home Sleep Study Failed:no:    Failure reason: N/A    Reported or Detected: N/A    Scored by:  JACOB Austin

## 2022-10-25 ENCOUNTER — OFFICE VISIT (OUTPATIENT)
Dept: CARDIOLOGY CLINIC | Facility: CLINIC | Age: 47
End: 2022-10-25
Payer: COMMERCIAL

## 2022-10-25 ENCOUNTER — TELEPHONE (OUTPATIENT)
Dept: SLEEP CENTER | Facility: CLINIC | Age: 47
End: 2022-10-25

## 2022-10-25 VITALS
DIASTOLIC BLOOD PRESSURE: 82 MMHG | HEIGHT: 68 IN | WEIGHT: 187 LBS | OXYGEN SATURATION: 97 % | HEART RATE: 69 BPM | TEMPERATURE: 97.8 F | BODY MASS INDEX: 28.34 KG/M2 | SYSTOLIC BLOOD PRESSURE: 134 MMHG

## 2022-10-25 DIAGNOSIS — M54.41 CHRONIC LEFT-SIDED LOW BACK PAIN WITH RIGHT-SIDED SCIATICA: ICD-10-CM

## 2022-10-25 DIAGNOSIS — I10 PRIMARY HYPERTENSION: ICD-10-CM

## 2022-10-25 DIAGNOSIS — I10 ESSENTIAL HYPERTENSION: Primary | ICD-10-CM

## 2022-10-25 DIAGNOSIS — G89.29 CHRONIC LEFT-SIDED LOW BACK PAIN WITH RIGHT-SIDED SCIATICA: ICD-10-CM

## 2022-10-25 DIAGNOSIS — G47.33 OSA (OBSTRUCTIVE SLEEP APNEA): Primary | ICD-10-CM

## 2022-10-25 PROCEDURE — 99214 OFFICE O/P EST MOD 30 MIN: CPT | Performed by: INTERNAL MEDICINE

## 2022-10-25 PROCEDURE — 95806 SLEEP STUDY UNATT&RESP EFFT: CPT | Performed by: INTERNAL MEDICINE

## 2022-10-25 NOTE — PROGRESS NOTES
Progress Note - Cardiology Office  [facility]    Gavin Crook 49 y o  female MRN: [id number]  : 1975  Encounter: [id number]      ASSESSMENT:  Hypertension  BP today is 134/82 mmHg  At home patient has been getting readings which range from 308 to 162 systolic  Currently on amlodipine 5 mg and hydrochlorothiazide 25 mg    TTE, 2022:  EF 55% moderately dilated left atrium, trace KS, mild MR and TR, RSVP 32,     Occasional rapid heart rate according to patient's watch juana    48 hour Holter monitor, 2022:  Sinus rhythm, average heart rate 68, range  bpm  Slowest heart rate of 50 per minute occurred at 1:45 a m   18 PVCs, 7 PACs  Symptoms of chest pressure, hot flashes, anxiousness corresponded to normal sinus rhythm       Asthma     Obesity, BMI 29 95     Snoring/excessive daytime somnolence  Rule out sleep apnea  Scheduled for sleep study           RECOMMENDATIONS:  Continue amlodipine and hydrochlorothiazide  Check result of sleep study  Low-salt diet, regular cardiovascular exercise and weight loss      Please call [phone] if any questions  HPI :     Gavin Crook is a 49y o  year old female who came for follow up  She is feeling better and has mild fluctuations in blood pressure  At home she has been getting readings ranging between 117 and 495 mm Hg systolic  She probably has an element of anxiety which raises her blood pressure  We discuss the findings of her echocardiogram and Holter monitor  Advised her on continue her medications, low-salt diet and regular cardiovascular exercise    REVIEW OF SYSTEMS:  Denies any new or acute cardiac symptoms    Denies chest pain, unusual dyspnea, or syncope    Historical Information   Past Medical History:   Diagnosis Date   • Achilles bursitis of left lower extremity    • Anxiety    • Asthma    • Depression    • Primary hypertension      Past Surgical History:   Procedure Laterality Date   • TONSILLECTOMY Social History     Substance and Sexual Activity   Alcohol Use Yes    Comment: social      Social History     Substance and Sexual Activity   Drug Use No     Social History     Tobacco Use   Smoking Status Never Smoker   Smokeless Tobacco Never Used     Family History:   Family History   Problem Relation Age of Onset   • Ulcerative colitis Mother    • Hypertension Mother    • Lung cancer Mother    • Hypertension Father    • Lung cancer Father    • Lung cancer Family    • No Known Problems Sister    • No Known Problems Maternal Uncle    • No Known Problems Paternal Aunt    • No Known Problems Paternal Uncle    • No Known Problems Maternal Grandfather    • No Known Problems Paternal Grandmother    • No Known Problems Paternal Grandfather    • Breast cancer Other    • ADD / ADHD Neg Hx    • Anesthesia problems Neg Hx    • Cancer Neg Hx    • Clotting disorder Neg Hx    • Collagen disease Neg Hx    • Diabetes Neg Hx    • Dislocations Neg Hx    • Learning disabilities Neg Hx    • Neurological problems Neg Hx    • Osteoporosis Neg Hx    • Rheumatologic disease Neg Hx    • Scoliosis Neg Hx    • Vascular Disease Neg Hx        Meds/Allergies     Allergies   Allergen Reactions   • Lisinopril Itching   • Cephalexin Rash   • Penicillins Rash       Current Outpatient Medications:   •  albuterol (PROVENTIL HFA,VENTOLIN HFA) 90 mcg/act inhaler, Inhale 2 puffs every 6 (six) hours as needed for wheezing, Disp: 8 g, Rfl: 1  •  amLODIPine (NORVASC) 5 mg tablet, Take 1 tablet (5 mg total) by mouth daily, Disp: 90 tablet, Rfl: 1  •  gabapentin (Neurontin) 300 mg capsule, Take 1 capsule (300 mg total) by mouth daily at bedtime, Disp: 30 capsule, Rfl: 6  •  hydrochlorothiazide (HYDRODIURIL) 25 mg tablet, Take 1 tablet (25 mg total) by mouth daily, Disp: 90 tablet, Rfl: 1  •  fluticasone (Flovent Diskus) 50 MCG/BLIST diskus inhaler, Inhale 1 puff 2 (two) times a day Rinse mouth after use , Disp: 60 blister, Rfl: 6  •  montelukast (SINGULAIR) 10 mg tablet, Take 1 tablet (10 mg total) by mouth daily at bedtime (Patient not taking: No sig reported), Disp: 90 tablet, Rfl: 1    Vitals: Blood pressure 134/82, pulse 69, temperature 97 8 °F (36 6 °C), height 5' 8" (1 727 m), weight 84 8 kg (187 lb), SpO2 97 %  ?  Body mass index is 28 43 kg/m²  Vitals:    10/25/22 1439   Weight: 84 8 kg (187 lb)     BP Readings from Last 3 Encounters:   10/25/22 134/82   08/25/22 144/87   07/27/22 120/80       Physical Exam:    Neurologic:  Alert & oriented x 3, no new focal deficits, Not in any acute distress,  Constitutional:  Well developed, well nourished, non-toxic appearance   Eyes:  Pupil equal and reacting to light, conjunctiva normal,   HENT:  Atraumatic, oropharynx moist, Neck- normal range of motion, no tenderness,  Neck supple, No JVP, No LNP   Respiratory:  Bilateral air entry, mostly clear to auscultation  Cardiovascular: S1-S2 regular with a II/VI systolic murmur   GI:  Soft, nondistended, normal bowel sounds, nontender, no hepatosplenomegaly appreciated  Musculoskeletal:  No tenderness, no deformities  Skin:  Well hydrated, no rash   Lymphatic:  No lymphadenopathy noted   Extremities:  No edema and distal pulses are present      Cardiac testing:       Results for orders placed during the hospital encounter of 08/25/22    Echo complete w/ contrast if indicated    Interpretation Summary  •  Left Ventricle: Left ventricular cavity size is normal  Wall thickness is normal  Systolic function is normal   Estimated ejection fraction is 55%  Wall motion is normal  Diastolic function is normal   •  Right Ventricle: Systolic function is normal   •  Left Atrium: The atrium is moderately dilated  •  Mitral Valve: There is mild regurgitation  •  Tricuspid Valve: There is mild regurgitation  RSVP is 32 mmHg  •  Pulmonic Valve: There is trace regurgitation        Results for orders placed during the hospital encounter of 08/25/22    Holter monitor    Interpretation Summary  PT NAME: Jackie Nuñez  : 1975  AGE: 51 y o  GENDER: female  MRN: [id number]  PROCEDURE: Holter monitor - 48 hour      INDICATIONS:  48 hour Holter monitor was performed for palpitations  PROCEDURE:    Average heart rate was 68 BPM, minimum heart rate was  at 50 BPM  and maximum heart rate was at 126 BPM     Ventricular ectopic activity consisted of 18 beats, which comprises 0 % of total beats  Most of the PVCs were singles  Supraventricular ectopic activity consisted of 7 beats, which comprises 0 % of total beats  Most of the PACs singles  The patient's rhythm included 16 hours and 25 minute of bradycardia  The slowest single episode of bradycardia occurred at 1:45 a m  , lasting 7 minute and 25 seconds , with a minimum heart rate 50 BPM     The patient's rhythm included 1 hour and 8 minutes of tachycardia  The fastest single episode of tachycardia occurred at 10:37 a m , lasting 1 minute and 5 seconds, with maximum HR of 126 BPM     Patient's diary included symptoms of chest pressure, hot flashes, pressure in the center of chest, anxious  Corresponding events on monitor was normal sinus rhythm with heart rate 60-90 beats per minute  No significant tachy-asia arrhythmias noted       Impression  1  48 hour holter tracing report shows no evidence of significant tachy-asia arrhythmias  Few PACs and PVCs were noted     2  Rhythm was sinus throughout monitoring period  "This note has been constructed using a voice recognition system  Therefore there may be syntax, spelling, and/or grammatical errors  Please call if you have any questions  "      Imaging:  Chest X-Ray:   No Chest XR results available for this patient  CT-scan of the chest:     No CTA results available for this patient    Lab Review   Lab Results   Component Value Date    WBC 10 23 (H) 2021    HGB 13 2 2021    HCT 41 1 2021    MCV 94 2021    RDW 12 8 2021  11/28/2021     BMP:  Lab Results   Component Value Date    SODIUM 140 11/28/2021    K 3 7 11/28/2021     11/28/2021    CO2 28 11/28/2021    BUN 26 (H) 11/28/2021    CREATININE 1 01 11/28/2021    GLUC 97 11/28/2021    CALCIUM 9 6 11/28/2021    EGFR 66 11/28/2021     LFT:  Lab Results   Component Value Date    AST 28 11/28/2021    ALT 40 11/28/2021    ALKPHOS 67 11/28/2021    TP 7 8 11/28/2021    ALB 4 3 11/28/2021      No components found for: TSH3  No results found for: [id number]  No results found for: HGBA1C  Lipid Profile:   No results found for: CHOLESTEROL, HDL, LDLCALC, TRIG  No results found for: CHOLESTEROL  No results found for: CKTOTAL, CKMB, CKMBINDEX, TROPONINI  No results found for: NTBNP   No results found for this or any previous visit (from the past 672 hour(s))  Dr Jimbo Gabriel MD, [facility] [place]      "This note has been constructed using a voice recognition system  Therefore there may be syntax, spelling, and/or grammatical errors   Please call if you have any questions  "

## 2022-10-25 NOTE — TELEPHONE ENCOUNTER
Patient left message asking for results of sleep study  States she has an appointment with cardiologist today and wanted to review results with him  Called patient and advised study has not yet been read  Once it is read, it will be available in [facility] for the cardiologist to review

## 2022-10-26 ENCOUNTER — APPOINTMENT (OUTPATIENT)
Dept: LAB | Facility: CLINIC | Age: 47
End: 2022-10-26

## 2022-10-26 ENCOUNTER — TELEPHONE (OUTPATIENT)
Dept: FAMILY MEDICINE CLINIC | Facility: CLINIC | Age: 47
End: 2022-10-26

## 2022-10-26 DIAGNOSIS — I10 PRIMARY HYPERTENSION: ICD-10-CM

## 2022-10-26 DIAGNOSIS — E87.1 HYPONATREMIA: Primary | ICD-10-CM

## 2022-10-26 LAB
ALBUMIN SERPL BCP-MCNC: 3.7 G/DL (ref 3.5–5)
ALP SERPL-CCNC: 51 U/L (ref 46–116)
ALT SERPL W P-5'-P-CCNC: 24 U/L (ref 12–78)
ANION GAP SERPL CALCULATED.3IONS-SCNC: 6 MMOL/L (ref 4–13)
AST SERPL W P-5'-P-CCNC: 15 U/L (ref 5–45)
BILIRUB SERPL-MCNC: 0.44 MG/DL (ref 0.2–1)
BUN SERPL-MCNC: 13 MG/DL (ref 5–25)
CALCIUM SERPL-MCNC: 9.2 MG/DL (ref 8.3–10.1)
CHLORIDE SERPL-SCNC: 94 MMOL/L (ref 96–108)
CHOLEST SERPL-MCNC: 189 MG/DL
CO2 SERPL-SCNC: 27 MMOL/L (ref 21–32)
CREAT SERPL-MCNC: 0.76 MG/DL (ref 0.6–1.3)
GFR SERPL CREATININE-BSD FRML MDRD: 93 ML/MIN/1.73SQ M
GLUCOSE P FAST SERPL-MCNC: 92 MG/DL (ref 65–99)
HDLC SERPL-MCNC: 52 MG/DL
LDLC SERPL CALC-MCNC: 115 MG/DL (ref 0–100)
NONHDLC SERPL-MCNC: 137 MG/DL
POTASSIUM SERPL-SCNC: 3.8 MMOL/L (ref 3.5–5.3)
PROT SERPL-MCNC: 7.4 G/DL (ref 6.4–8.4)
SODIUM SERPL-SCNC: 127 MMOL/L (ref 135–147)
TRIGL SERPL-MCNC: 112 MG/DL
TSH SERPL DL<=0.05 MIU/L-ACNC: 5.83 UIU/ML (ref 0.45–4.5)

## 2022-10-26 RX ORDER — GABAPENTIN 300 MG/1
300 CAPSULE ORAL
Qty: 30 CAPSULE | Refills: 0 | Status: SHIPPED | OUTPATIENT
Start: 2022-10-26

## 2022-10-26 RX ORDER — HYDROCHLOROTHIAZIDE 25 MG/1
25 TABLET ORAL DAILY
Qty: 90 TABLET | Refills: 0 | Status: SHIPPED | OUTPATIENT
Start: 2022-10-26 | End: 2022-11-02

## 2022-10-26 RX ORDER — AMLODIPINE BESYLATE 5 MG/1
5 TABLET ORAL DAILY
Qty: 90 TABLET | Refills: 0 | Status: SHIPPED | OUTPATIENT
Start: 2022-10-26

## 2022-10-27 LAB

## 2022-10-27 NOTE — PROGRESS NOTES
Cpap orders were submitted to [facility]  Pt will be set up at [facility] on 11/15 at 2 pm w/ Rhina Rapp

## 2022-10-31 ENCOUNTER — APPOINTMENT (OUTPATIENT)
Dept: LAB | Facility: CLINIC | Age: 47
End: 2022-10-31

## 2022-10-31 DIAGNOSIS — E87.1 HYPOSMOLALITY SYNDROME: Primary | ICD-10-CM

## 2022-10-31 LAB
ANION GAP SERPL CALCULATED.3IONS-SCNC: 4 MMOL/L (ref 4–13)
BUN SERPL-MCNC: 20 MG/DL (ref 5–25)
CALCIUM SERPL-MCNC: 9.6 MG/DL (ref 8.3–10.1)
CHLORIDE SERPL-SCNC: 106 MMOL/L (ref 96–108)
CO2 SERPL-SCNC: 27 MMOL/L (ref 21–32)
CREAT SERPL-MCNC: 0.78 MG/DL (ref 0.6–1.3)
GFR SERPL CREATININE-BSD FRML MDRD: 90 ML/MIN/1.73SQ M
GLUCOSE SERPL-MCNC: 92 MG/DL (ref 65–140)
POTASSIUM SERPL-SCNC: 4 MMOL/L (ref 3.5–5.3)
SODIUM SERPL-SCNC: 137 MMOL/L (ref 135–147)

## 2022-11-02 ENCOUNTER — OFFICE VISIT (OUTPATIENT)
Dept: FAMILY MEDICINE CLINIC | Facility: CLINIC | Age: 47
End: 2022-11-02

## 2022-11-02 VITALS
HEIGHT: 68 IN | RESPIRATION RATE: 14 BRPM | SYSTOLIC BLOOD PRESSURE: 130 MMHG | HEART RATE: 60 BPM | BODY MASS INDEX: 27.74 KG/M2 | WEIGHT: 183 LBS | DIASTOLIC BLOOD PRESSURE: 80 MMHG | TEMPERATURE: 97.6 F

## 2022-11-02 DIAGNOSIS — G89.29 CHRONIC BILATERAL LOW BACK PAIN WITHOUT SCIATICA: Primary | ICD-10-CM

## 2022-11-02 DIAGNOSIS — M54.50 CHRONIC BILATERAL LOW BACK PAIN WITHOUT SCIATICA: Primary | ICD-10-CM

## 2022-11-02 RX ORDER — BACLOFEN 10 MG/1
10 TABLET ORAL 3 TIMES DAILY
Qty: 90 TABLET | Refills: 1 | Status: SHIPPED | OUTPATIENT
Start: 2022-11-02
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
## 2022-11-03 LAB

## 2022-11-08 DIAGNOSIS — E87.1 HYPONATREMIA: Primary | ICD-10-CM

## 2022-11-15 LAB

## 2022-11-16 ENCOUNTER — TELEPHONE (OUTPATIENT)
Dept: OBGYN CLINIC | Facility: CLINIC | Age: 47
End: 2022-11-16

## 2022-11-16 NOTE — TELEPHONE ENCOUNTER
Spoke to patient, reviewed taking ibuprofen before appt and if at any time during the procedure its to much for her or to painful she can ask them to stop  Patient understands  Just very nervous

## 2022-11-16 NOTE — TELEPHONE ENCOUNTER
Pt has a cervical polypectomy tomorrow , she is very concerned about the pain  She states she had a endo biopsy and was completely blind sided by how painful it was  She would like to speak to you in regards to this so she is not blind sided by the pain and procedure scheduled for tomorrow

## 2022-11-17 ENCOUNTER — EVALUATION (OUTPATIENT)
Dept: PHYSICAL THERAPY | Facility: CLINIC | Age: 47
End: 2022-11-17

## 2022-11-17 ENCOUNTER — PROCEDURE VISIT (OUTPATIENT)
Dept: OBGYN CLINIC | Facility: CLINIC | Age: 47
End: 2022-11-17

## 2022-11-17 VITALS — WEIGHT: 183 LBS | DIASTOLIC BLOOD PRESSURE: 70 MMHG | BODY MASS INDEX: 27.83 KG/M2 | SYSTOLIC BLOOD PRESSURE: 120 MMHG

## 2022-11-17 DIAGNOSIS — M54.50 CHRONIC BILATERAL LOW BACK PAIN WITHOUT SCIATICA: Primary | ICD-10-CM

## 2022-11-17 DIAGNOSIS — G89.29 CHRONIC BILATERAL LOW BACK PAIN WITHOUT SCIATICA: Primary | ICD-10-CM

## 2022-11-17 DIAGNOSIS — N84.1 CERVICAL POLYP: Primary | ICD-10-CM

## 2022-11-17 NOTE — PROGRESS NOTES
Cervical Polypectomy    Date/Time: 11/17/2022 9:53 AM  Performed by: Yarely Ortega MD  Authorized by: Yarely Ortega MD   Universal Protocol:  Consent: Verbal consent obtained  Risks and benefits: risks, benefits and alternatives were discussed  Consent given by: patient  Time out: Immediately prior to procedure a "time out" was called to verify the correct patient, procedure, equipment, support staff and site/side marked as required  Patient understanding: patient states understanding of the procedure being performed  Relevant documents: relevant documents present and verified  Required items: required blood products, implants, devices, and special equipment available  Patient identity confirmed: verbally with patient and provided demographic data      Procedure Details - Lesion Biopsy: Body area: Anogenital    Anogenital location: cervical     Vaginal Lesion: No      Biopsy tissue type: mucous membrane    Malignancy: malignancy unknown       Cervical polyp noted 6/2022  Given patient discomfort with endometrial biopsy at prior appointment, cervical polypectomy not performed at the time  Returns today after premedicating at home with motrin and baclofen  Tolerated procedure well    Reviewed monitoring for vaginal bleeding, pelvic pain, fevers, etc  All questions answered to the best of my ability
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
## 2022-11-28 ENCOUNTER — OFFICE VISIT (OUTPATIENT)
Dept: PHYSICAL THERAPY | Facility: CLINIC | Age: 47
End: 2022-11-28

## 2022-11-28 DIAGNOSIS — G89.29 CHRONIC BILATERAL LOW BACK PAIN WITHOUT SCIATICA: Primary | ICD-10-CM

## 2022-11-28 DIAGNOSIS — M54.50 CHRONIC BILATERAL LOW BACK PAIN WITHOUT SCIATICA: Primary | ICD-10-CM

## 2022-11-28 NOTE — PROGRESS NOTES
Daily Note     Today's date: 2022  Patient name: Natalee Gonzales  : 1975  MRN: [id number]  Referring provider: Maday Wills MD  Dx:   Encounter Diagnosis     ICD-10-CM    1  Chronic bilateral low back pain without sciatica  M54 50     G89 29                      Subjective: pt reports the lumbar roll and back extensions have helped - she hasn't had hip pain, but seems to have more local back pain  Her right hip does still feel stiff  She reports R leg feels weak/harder to move w/ clamshell vs L (no pain)  Objective: See treatment diary below  Rep hip exten loaded 1x10 =  NE  JORDY w/ B/L knee flexion 2x10 =  NE  Prone lumbar ext mobs - not charlee  Lumbar opening = dec/B LB  Prone glut TP rel = dec/B hip clamshells    Numbness, which transitions to paresthesias after L sciatic nerve tensioners  Chose qped thread the needle over open books due to c/o ERP w/ open books  Assessment: Tolerated treatment well  Patient potentially has more than one issue generating symptoms  She has symptoms consistent w/ L LE radiculopathy (L foot numbness) and R radic (recent hx hip pain) and lumbar dysfunction (ERP w/ extension and rotation)  Plan: Continue per plan of care  Updated HEP and fully reviewed w/ pt        Precautions:   Past Medical History:   Diagnosis Date   • Achilles bursitis of left lower extremity    • Anxiety    • Asthma    • Depression    • Primary hypertension            date 2022      Visit #/auth (exp 23)  2/12              Manual  10'      Prone P to [address] pain      Lumbar opening in side lying  Gr 3-4 R & L       R glut TP rel prone w/ hip ER/IR  TT      Neuro Re-Ed  20'      MARIXA against counter 2x10 = dec/B       MARIXA in JORDY against wall instructed       EIL from qped 2x5 lilly henry w/ pain       Sciatic NG's  1x10 R/L      bridges  1x10      JORDY w/ B/L knee flexion  2x10      qped thread the needle  1x10 R/L      Ther Ex  15'              clamshells 1x10 R/L      Reverse clamshells  2x10 R/L      Cat/camel  2x10                      Pt educ Lumbar roll; disc mechancis; periph vs centraliz of symptoms       HEP Issued/reviewed Update & review      Ther Activity                        Gait Training                        Modalities                        Access Code: [email or web address]  URL: [email or web address]  Date: 11/28/2022  Prepared by:  Vasyl Beyer    Exercises  • Prone Knee Flexion AROM - 1 x daily - 2 sets - 10 reps  • Cat Cow - 1 x daily - 2 sets - 10 reps  • Quadruped Full Range Thoracic Rotation with Reach - 1 x daily - 2 sets - 10 reps  • Sidelying Reverse Clamshell - 1 x daily - 2 sets - 10 reps  • Clamshell - 1 x daily - 2 sets - 10 reps  • Supine Bridge - 1 x daily - 2 sets - 10 reps  • Supine Sciatic Nerve Glide - 1 x daily - 2 sets - 10 reps  • Seated Piriformis Stretch with Trunk Bend - 1 x daily - 3 reps - 20 hold

## 2022-12-01 ENCOUNTER — APPOINTMENT (OUTPATIENT)
Dept: LAB | Facility: CLINIC | Age: 47
End: 2022-12-01

## 2022-12-01 ENCOUNTER — OFFICE VISIT (OUTPATIENT)
Dept: PHYSICAL THERAPY | Facility: CLINIC | Age: 47
End: 2022-12-01

## 2022-12-01 DIAGNOSIS — G89.29 CHRONIC BILATERAL LOW BACK PAIN WITHOUT SCIATICA: Primary | ICD-10-CM

## 2022-12-01 DIAGNOSIS — M54.50 CHRONIC BILATERAL LOW BACK PAIN WITHOUT SCIATICA: Primary | ICD-10-CM

## 2022-12-01 DIAGNOSIS — E87.1 HYPONATREMIA: Primary | ICD-10-CM

## 2022-12-01 LAB
ANION GAP SERPL CALCULATED.3IONS-SCNC: 5 MMOL/L (ref 4–13)
BUN SERPL-MCNC: 26 MG/DL (ref 5–25)
CALCIUM SERPL-MCNC: 9.7 MG/DL (ref 8.3–10.1)
CHLORIDE SERPL-SCNC: 106 MMOL/L (ref 96–108)
CO2 SERPL-SCNC: 28 MMOL/L (ref 21–32)
CREAT SERPL-MCNC: 0.88 MG/DL (ref 0.6–1.3)
GFR SERPL CREATININE-BSD FRML MDRD: 77 ML/MIN/1.73SQ M
GLUCOSE SERPL-MCNC: 86 MG/DL (ref 65–140)
POTASSIUM SERPL-SCNC: 4.1 MMOL/L (ref 3.5–5.3)
SODIUM SERPL-SCNC: 139 MMOL/L (ref 135–147)

## 2022-12-01 NOTE — PROGRESS NOTES
Daily Note     Today's date: 2022  Patient name: Nicklas Snellen  : 1975  MRN: [id number]  Referring provider: Leelee Pérez MD  Dx:   Encounter Diagnosis     ICD-10-CM    1  Chronic bilateral low back pain without sciatica  M54 50     G89 29                      Subjective: Pt reports generally feeling better w/ PT so far  She c/o stiffness and "feeling stuck" mid thoracic through lower cervical spine today during thread the needle  She reports some improvement after sit thor extension, cerv retractions and supine flexion w/ cane  Pt reports L lumbar opening mobilizations feel good, but also notes clamshells are harder to do afterward (not painful)  After lumbar opening mobilization to R side (cavitation), pt reports improvement in clamshells  Objective: See treatment diary below; updated HEP  Assessment: Tolerated treatment well  Patient would benefit from continued PT and progression to core/postural strengthening  Plan: Continue per plan of care        Precautions:   Past Medical History:   Diagnosis Date   • Achilles bursitis of left lower extremity    • Anxiety    • Asthma    • Depression    • Primary hypertension            date 2022     Visit #/auth (exp 23)  2/12 3/12             Manual  10' 10'     Prone P to [address] pain      Lumbar opening in side lying  Gr 3-4 R & L  Gr 3-4 R & L     R glut TP rel prone w/ hip ER/IR  TT      Neuro Re-Ed  20' 10'     MARIXA against counter 2x10 = dec/B       MARIXA in JORDY against wall instructed       EIL from qped 2x5 lilly henry w/ pain       Sciatic NG's  1x10 R/L 1x10 R/L     bridges  1x10 W/ TB abd grn 1x10     JORDY w/ B/L knee flexion  2x10      qped thread the needle  1x10 R/L 1x10 R/L     Ther Ex  15' 25'     Sit thoracic extension   2x10     clamshells  1x10 R/L 1x10     Reverse clamshells  2x10 R/L 1x10     Cat/camel  2x10 2x10     UE flexion supine w/ cane   5"x10     cerv retr w/ OP   5"x10 Towel rotat SNAG cerv   10"x5 R/L     Pt educ Lumbar roll; disc mechancis; periph vs centraliz of symptoms       HEP Issued/reviewed Update & review Updated & review     Ther Activity                        Gait Training                        Modalities                        Access Code: [email or web address]  URL: [email or web address]  Date: 12/01/2022  Prepared by:  Nelly Alvarado  • Prone Knee Flexion AROM - 1 x daily - 2 sets - 10 reps  • Cat Cow - 1 x daily - 2 sets - 10 reps  • Quadruped Full Range Thoracic Rotation with Reach - 1 x daily - 2 sets - 10 reps  • Sidelying Reverse Clamshell - 1 x daily - 2 sets - 10 reps  • Clamshell - 1 x daily - 2 sets - 10 reps  • Supine Shoulder Flexion Extension AAROM with Dowel - 1 x daily - 10 reps - 5 hold  • Supine Bridge with Resistance Band - 1 x daily - 1 sets - 10 reps  • Supine Sciatic Nerve Glide - 1 x daily - 2 sets - 10 reps  • Seated Piriformis Stretch with Trunk Bend - 1 x daily - 3 reps - 20 hold  • Seated Thoracic Lumbar Extension - 1 x daily - 2 sets - 10 reps  • Seated Passive Cervical Retraction - 1 x daily - 10 reps - 5 hold  • Seated Assisted Cervical Rotation with Towel - 1 x daily - 5 reps - 10 hold

## 2022-12-05 ENCOUNTER — OFFICE VISIT (OUTPATIENT)
Dept: PHYSICAL THERAPY | Facility: CLINIC | Age: 47
End: 2022-12-05

## 2022-12-05 DIAGNOSIS — M54.50 CHRONIC BILATERAL LOW BACK PAIN WITHOUT SCIATICA: Primary | ICD-10-CM

## 2022-12-05 DIAGNOSIS — G89.29 CHRONIC BILATERAL LOW BACK PAIN WITHOUT SCIATICA: Primary | ICD-10-CM

## 2022-12-05 NOTE — PROGRESS NOTES
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 and is feeling very stiff  She noticed when she is driving with her lumbar roll that her hip feels better but her LB is feeling more sore with this  On arrival she is feeling stiff and sore in her LB  Objective: See treatment diary below       Assessment: Tolerated treatment well  Pt has increases in thoracic LBP with seated thor ext, this was decreased with cat/camel  Tolerated open books today without lasting discomfort  Pt was edu on centralization of symptoms due to her decreased hip pain and increases in LB soreness, pt dem an understanding and will cont with lumbar roll during sitting/driving  Decreased back symptoms post session  Patient would benefit from continued PT      Plan: Continue per plan of care        Precautions:   Past Medical History:   Diagnosis Date   • Achilles bursitis of left lower extremity    • Anxiety    • Asthma    • Depression    • Primary hypertension            date 2022    Visit #/auth (exp 23)  2/12 3/12 4/12            Manual  10' 10'     Prone P to [address] pain      Lumbar opening in side lying  Gr 3-4 R & L  Gr 3-4 R & L     R glut TP rel prone w/ hip ER/IR  TT      Neuro Re-Ed  20' 10'     MARIXA against counter 2x10 = dec/B       MARIXA in JORDY against wall instructed       EIL from qped 2x5 lilly henry w/ pain       Sciatic NG's  1x10 R/L 1x10 R/L     bridges  1x10 W/ TB abd grn 1x10     JORDY w/ B/L knee flexion  2x10      qped thread the needle  1x10 R/L 1x10 R/L     Ther Ex  15' 25' 40'     Sit thoracic extension   2x10 1x10     SL open books     5"x10 R/L clamshells  1x10 R/L 1x10 2x10 R/L     Reverse clamshells  2x10 R/L 1x10 1x10 R/L     Wall slides     5"x10     Cat/camel  2x10 2x10 2x10     UE flexion supine w/ cane   5"x10 5"x10     cerv retr w/ OP   5"x10 5"x10     Towel rotat SNAG cerv   10"x5 R/L 5"x10 R/L     Pt educ Lumbar roll; disc mechancis; periph vs centraliz of symptoms       HEP Issued/reviewed Update & review Updated & review     Ther Activity                        Gait Training                        Modalities                        Access Code: [email or web address]  URL: [email or web address]  Date: 12/01/2022  Prepared by:  Karan Freeman    Exercises  • Prone Knee Flexion AROM - 1 x daily - 2 sets - 10 reps  • Cat Cow - 1 x daily - 2 sets - 10 reps  • Quadruped Full Range Thoracic Rotation with Reach - 1 x daily - 2 sets - 10 reps  • Sidelying Reverse Clamshell - 1 x daily - 2 sets - 10 reps  • Clamshell - 1 x daily - 2 sets - 10 reps  • Supine Shoulder Flexion Extension AAROM with Dowel - 1 x daily - 10 reps - 5 hold  • Supine Bridge with Resistance Band - 1 x daily - 1 sets - 10 reps  • Supine Sciatic Nerve Glide - 1 x daily - 2 sets - 10 reps  • Seated Piriformis Stretch with Trunk Bend - 1 x daily - 3 reps - 20 hold  • Seated Thoracic Lumbar Extension - 1 x daily - 2 sets - 10 reps  • Seated Passive Cervical Retraction - 1 x daily - 10 reps - 5 hold  • Seated Assisted Cervical Rotation with Towel - 1 x daily - 5 reps - 10 hold

## 2022-12-07 ENCOUNTER — HOSPITAL ENCOUNTER (OUTPATIENT)
Dept: RADIOLOGY | Facility: HOSPITAL | Age: 47
Discharge: HOME | End: 2022-12-07
Attending: FAMILY MEDICINE

## 2022-12-07 ENCOUNTER — OFFICE VISIT (OUTPATIENT)
Dept: PHYSICAL THERAPY | Facility: CLINIC | Age: 47
End: 2022-12-07

## 2022-12-07 DIAGNOSIS — G89.29 CHRONIC BILATERAL LOW BACK PAIN WITHOUT SCIATICA: Primary | ICD-10-CM

## 2022-12-07 DIAGNOSIS — G89.29 CHRONIC BILATERAL LOW BACK PAIN WITHOUT SCIATICA: ICD-10-CM

## 2022-12-07 DIAGNOSIS — M54.50 CHRONIC BILATERAL LOW BACK PAIN WITHOUT SCIATICA: ICD-10-CM

## 2022-12-07 DIAGNOSIS — M54.50 CHRONIC BILATERAL LOW BACK PAIN WITHOUT SCIATICA: Primary | ICD-10-CM

## 2022-12-07 NOTE — PROGRESS NOTES
Daily Note     Today's date: 2022  Patient name: Pearl Gallardo  : 1975  MRN: [id number]  Referring provider: Jessica Angel MD  Dx:   Encounter Diagnosis     ICD-10-CM    1  Chronic bilateral low back pain without sciatica  M54 50     G89 29                      Subjective: pt reports local R LBP 10 today (points to R QL)  She had an MRI earlier today  Pt states new exercises today are challenging but seem well tolerated  She reports some relief and improved comfort w/ lumbar rotation after QL TP release  Objective: See treatment diary below      Assessment: Tolerated treatment well and is able to progress core stability exercises    Patient would benefit from continued PT and progression as tolerated  Plan: Continue per plan of care        Precautions:   Past Medical History:   Diagnosis Date   • Achilles bursitis of left lower extremity    • Anxiety    • Asthma    • Depression    • Primary hypertension            date 2022   Visit #/auth (exp 23)  2/12 3/12 4/12 5/12           Manual  10' 10'     Prone P to 1025 Monitor110 pain      Lumbar opening in side lying  Gr 3-4 R & L  Gr 3-4 R & L     R glut TP rel prone w/ hip ER/IR  TT      R QL TP release in L SL     TT 5'   Neuro Re-Ed  20' 10'  25'   Sit QL stretch after TP release     10"x3   MARIXA against counter 2x10 = dec/B       MARIXA in JORDY against wall instructed       EIL from qped 2x5 lilly henry w/ pain       Sciatic NG's  1x10 R/L 1x10 R/L  1x10 R/L   bridges  1x10 W/ TB abd grn 1x10     JORDY w/ B/L knee flexion  2x10      qped thread the needle  1x10 R/L 1x10 R/L     Supine tabletop on pball bridge     5"x10   Serratus rollups     2x10   supermans at wall     5"x10   Monster walk     YTB @ ankles 15'x1 fwd/bkwd   Side elbow plank ups     5"x5; 2x   Ther Ex  15' 25' 40'  10'   Sit thoracic extension   2x10 1x10     SL open books     5"x10 R/L     clamshells  1x10 R/L 1x10 2x10 R/L Reverse clamshells  2x10 R/L 1x10 1x10 R/L     Wall slides     5"x10     Cat/camel  2x10 2x10 2x10  1x10   UE flexion supine w/ cane   5"x10 5"x10     cerv retr w/ OP   5"x10 5"x10  5"x5   Towel rotat SNAG cerv   10"x5 R/L 5"x10 R/L  10"x5 r/L   Pt educ Lumbar roll; disc mechancis; periph vs centraliz of symptoms       HEP Issued/reviewed Update & review Updated & review     Ther Activity                        Gait Training                        Modalities                        Access Code: [email or web address]  URL: [email or web address]  Date: 12/01/2022  Prepared by:  Karina Knapp    Exercises  • Prone Knee Flexion AROM - 1 x daily - 2 sets - 10 reps  • Cat Cow - 1 x daily - 2 sets - 10 reps  • Quadruped Full Range Thoracic Rotation with Reach - 1 x daily - 2 sets - 10 reps  • Sidelying Reverse Clamshell - 1 x daily - 2 sets - 10 reps  • Clamshell - 1 x daily - 2 sets - 10 reps  • Supine Shoulder Flexion Extension AAROM with Dowel - 1 x daily - 10 reps - 5 hold  • Supine Bridge with Resistance Band - 1 x daily - 1 sets - 10 reps  • Supine Sciatic Nerve Glide - 1 x daily - 2 sets - 10 reps  • Seated Piriformis Stretch with Trunk Bend - 1 x daily - 3 reps - 20 hold  • Seated Thoracic Lumbar Extension - 1 x daily - 2 sets - 10 reps  • Seated Passive Cervical Retraction - 1 x daily - 10 reps - 5 hold  • Seated Assisted Cervical Rotation with Towel - 1 x daily - 5 reps - 10 hold

## 2022-12-13 ENCOUNTER — OFFICE VISIT (OUTPATIENT)
Dept: PHYSICAL THERAPY | Facility: CLINIC | Age: 47
End: 2022-12-13

## 2022-12-13 DIAGNOSIS — M54.50 CHRONIC BILATERAL LOW BACK PAIN WITHOUT SCIATICA: Primary | ICD-10-CM

## 2022-12-13 DIAGNOSIS — G89.29 CHRONIC BILATERAL LOW BACK PAIN WITHOUT SCIATICA: Primary | ICD-10-CM

## 2022-12-13 NOTE — PROGRESS NOTES
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  Upon arrival today, she reports local pain L2 3/10  And pain in anterior hips w/ active hip flexion, today 4/10, but worse over the weekend  This hip pain is worse w/ prolonged walking, she cannot run  She also reports continued numbness R 1st web space which is variable, but constant  She reports LE's generally feel weak and she needs to limit standing activity  She does her HEP once/day  Objective: See treatment diary below; we had settled in on once/day HEP for general ROM since no directional preference established at previous visits  MRI report is now in her chart  Multi level disc desiccation and disc bulges  Repeated mechanical assessment today, see below  Rep flexion in sitting 2x10 = dec/B back pain; NE hip pain; NE 1st web space  REIL 1x10 w/ belt fixation @ L4 (since w/o belt pt moves only at upper lumbar) = inc/W central back pain /10; dec/B hip pain; inc R paresthesias R 1st web space  Supine sciatic nerve tensioners 1x10 = R/L - inc/NW paresthesias; reports generally feeling better/looser, but cannot be specific  Prone P to A mobs L4-5 gr 3-4 = inc numbness R 1st web space; inc pain w/ hip flexion  Re tested Rep flexion in sitting 1x10 = dec B numbness R 1st web space  Rep SGIS (L side against wall) 1x10 = NE R 1st web space; prod W L foot paresthesias; dec B hip flexion  Assessment: Tolerated treatment fair and has not directional preference that helps each of her c/o  MRI indicates she has multiple levels of concern   I suggested she may benefit from traction which we do not have here, but I can reach out to our [address] location for availability    Patient would benefit from continued PT and potential trial of traction  Pt was advised to continue several times/day in the meantime her sciatic nerve glides and cat/camel as this provides segmental ROM, but not to end range  I asked her to pay closer attention to specific symtom response  Plan: Continue per plan of care  Pt will see therapist at our [facility]  location for 2nd opinion and potiential traction trial 12/15/2022 at 11:00       Precautions:   Past Medical History:   Diagnosis Date   • Achilles bursitis of left lower extremity    • Anxiety    • Asthma    • Depression    • Primary hypertension            date 12/13/2022 12/1/2022 12/5/2022 12/7/2022   Visit #/auth (exp 1/18/23) 6/12  3/12 4/12 5/12           Manual 5'  10'     Prone P to [employer]/w - see above       Lumbar opening in side lying   Gr 3-4 R & L     R glut TP rel prone w/ hip ER/IR        R QL TP release in L SL     TT 5'   Neuro Re-Ed 10'  10'  25'   Sit QL stretch after TP release     10"x3   MARIXA against counter        MARIXA in JORDY against wall        EIL from qped        Sciatic NG's 2x10 R/L  1x10 R/L  1x10 R/L   bridges   W/ TB abd grn 1x10     JORDY w/ B/L knee flexion        qped thread the needle   1x10 R/L     Supine tabletop on pball bridge     5"x10   Serratus rollups     2x10   supermans at wall     5"x10   Monster walk     YTB @ ankles 15'x1 fwd/bkwd   Side elbow plank ups     5"x5; 2x   Ther Ex   25' 40'  10'   Sit thoracic extension   2x10 1x10     SL open books     5"x10 R/L     clamshells   1x10 2x10 R/L     Reverse clamshells   1x10 1x10 R/L     Wall slides     5"x10     Cat/camel   2x10 2x10  1x10   UE flexion supine w/ cane   5"x10 5"x10     cerv retr w/ OP   5"x10 5"x10  5"x5   Towel rotat SNAG cerv   10"x5 R/L 5"x10 R/L  10"x5 r/L   mec assessment 30'       HEP   Updated & review     Ther Activity                        Gait Training                        Modalities Access Code: [email or web address]  URL: [email or web address]  Date: 12/01/2022  Prepared by:  Khadijah Alvarado  • Prone Knee Flexion AROM - 1 x daily - 2 sets - 10 reps  • Cat Cow - 1 x daily - 2 sets - 10 reps  • Quadruped Full Range Thoracic Rotation with Reach - 1 x daily - 2 sets - 10 reps  • Sidelying Reverse Clamshell - 1 x daily - 2 sets - 10 reps  • Clamshell - 1 x daily - 2 sets - 10 reps  • Supine Shoulder Flexion Extension AAROM with Dowel - 1 x daily - 10 reps - 5 hold  • Supine Bridge with Resistance Band - 1 x daily - 1 sets - 10 reps  • Supine Sciatic Nerve Glide - 1 x daily - 2 sets - 10 reps  • Seated Piriformis Stretch with Trunk Bend - 1 x daily - 3 reps - 20 hold  • Seated Thoracic Lumbar Extension - 1 x daily - 2 sets - 10 reps  • Seated Passive Cervical Retraction - 1 x daily - 10 reps - 5 hold  • Seated Assisted Cervical Rotation with Towel - 1 x daily - 5 reps - 10 hold

## 2022-12-15 ENCOUNTER — APPOINTMENT (OUTPATIENT)
Dept: PHYSICAL THERAPY | Facility: CLINIC | Age: 47
End: 2022-12-15

## 2022-12-15 ENCOUNTER — OFFICE VISIT (OUTPATIENT)
Dept: PHYSICAL THERAPY | Facility: CLINIC | Age: 47
End: 2022-12-15

## 2022-12-15 DIAGNOSIS — M54.50 CHRONIC BILATERAL LOW BACK PAIN WITHOUT SCIATICA: ICD-10-CM

## 2022-12-15 DIAGNOSIS — G89.29 CHRONIC BILATERAL LOW BACK PAIN WITHOUT SCIATICA: ICD-10-CM

## 2022-12-15 DIAGNOSIS — G89.29 CHRONIC BILATERAL LOW BACK PAIN WITHOUT SCIATICA: Primary | ICD-10-CM

## 2022-12-15 DIAGNOSIS — G89.29 CHRONIC LEFT-SIDED LOW BACK PAIN WITH RIGHT-SIDED SCIATICA: ICD-10-CM

## 2022-12-15 DIAGNOSIS — M54.50 CHRONIC BILATERAL LOW BACK PAIN WITHOUT SCIATICA: Primary | ICD-10-CM

## 2022-12-15 DIAGNOSIS — M54.41 CHRONIC LEFT-SIDED LOW BACK PAIN WITH RIGHT-SIDED SCIATICA: ICD-10-CM

## 2022-12-15 RX ORDER — GABAPENTIN 300 MG/1
300 CAPSULE ORAL
Qty: 30 CAPSULE | Refills: 0 | Status: SHIPPED | OUTPATIENT
Start: 2022-12-15

## 2022-12-15 RX ORDER — BACLOFEN 10 MG/1
10 TABLET ORAL 3 TIMES DAILY
Qty: 90 TABLET | Refills: 0 | Status: SHIPPED | OUTPATIENT
Start: 2022-12-15

## 2022-12-15 NOTE — PROGRESS NOTES
Daily Note     Today's date: 12/15/2022  Patient name: Magali Mendoza  : 1975  MRN: [id number]   Referring provider: Kj Wood MD  Dx:   Encounter Diagnosis     ICD-10-CM    1  Chronic bilateral low back pain without sciatica  M54 50     G89 29                      Subjective: pt presents to clinic with 2/10 soreness today, unlike her general status  She does feel she has less tingling in her toes today which she attributes to laser implementation 2 days in a row  Post session pt reports she feels "better "       Objective: See treatment diary below  Mechanical traction implemented today  Assessment: Tolerated treatment well  Patient demo soreness following session and "jello like" sensation in legs which resolved  No weakness noted no gait deviations as a result  Overall less soreness following today's care plan  Plan: Continue per plan of care  Pt will contact primary PT to decide how to further progress POC based on results of today's session        Precautions:   Past Medical History:   Diagnosis Date   • Achilles bursitis of left lower extremity    • Anxiety    • Asthma    • Depression    • Primary hypertension            date 2022 12/15/2022  2022 2022   Visit #/auth (exp 23)            Manual 5'       Prone P to [employer]/w - see above       Lumbar opening in side lying        R glut TP rel prone w/ hip ER/IR        R QL TP release in L SL     TT 5'   Neuro Re-Ed 10'    25'   Sit QL stretch after TP release     10"x3   MARIXA against counter        MARIXA in JORDY against wall        EIL from qped        Sciatic NG's 2x10 R/L    1x10 R/L   bridges        JORDY w/ B/L knee flexion        qped thread the needle        Supine tabletop on pball bridge     5"x10   Serratus rollups     2x10   supermans at wall     5"x10   Monster walk     YTB @ ankles 15'x1 fwd/bkwd   Side elbow plank ups     5"x5; 2x   Ther Ex    40'  10'   Sit thoracic extension 1x10     SL open books     5"x10 R/L     clamshells    2x10 R/L     Reverse clamshells    1x10 R/L     Wall slides     5"x10     Cat/camel    2x10  1x10   UE flexion supine w/ cane    5"x10     cerv retr w/ OP    5"x10  5"x5   Towel rotat SNAG cerv    5"x10 R/L  10"x5 r/L   [facility] assessment 30'       HEP  15' discussing self management following today's session and POC progression      Ther Activity                        Gait Training                        Modalities          Mechanical Traction 10' static 6 ramp up/down 80lbs pull, 15' edu and set up/break down              Access Code: [email or web address]  URL: [email or web address]  Date: 12/01/2022  Prepared by:  Rimma Danielle    Exercises  • Prone Knee Flexion AROM - 1 x daily - 2 sets - 10 reps  • Cat Cow - 1 x daily - 2 sets - 10 reps  • Quadruped Full Range Thoracic Rotation with Reach - 1 x daily - 2 sets - 10 reps  • Sidelying Reverse Clamshell - 1 x daily - 2 sets - 10 reps  • Clamshell - 1 x daily - 2 sets - 10 reps  • Supine Shoulder Flexion Extension AAROM with Dowel - 1 x daily - 10 reps - 5 hold  • Supine Bridge with Resistance Band - 1 x daily - 1 sets - 10 reps  • Supine Sciatic Nerve Glide - 1 x daily - 2 sets - 10 reps  • Seated Piriformis Stretch with Trunk Bend - 1 x daily - 3 reps - 20 hold  • Seated Thoracic Lumbar Extension - 1 x daily - 2 sets - 10 reps  • Seated Passive Cervical Retraction - 1 x daily - 10 reps - 5 hold  • Seated Assisted Cervical Rotation with Towel - 1 x daily - 5 reps - 10 hold

## 2022-12-23 DIAGNOSIS — I10 PRIMARY HYPERTENSION: Primary | ICD-10-CM

## 2022-12-23 RX ORDER — HYDROCHLOROTHIAZIDE 12.5 MG/1
12.5 TABLET ORAL DAILY
Qty: 30 TABLET | Refills: 1 | Status: SHIPPED | OUTPATIENT
Start: 2022-12-23 | End: 2023-01-24 | Stop reason: SDUPTHER

## 2022-12-29 DIAGNOSIS — I10 PRIMARY HYPERTENSION: Primary | ICD-10-CM

## 2023-01-03 ENCOUNTER — OFFICE VISIT (OUTPATIENT)
Dept: PHYSICAL THERAPY | Facility: CLINIC | Age: 48
End: 2023-01-03

## 2023-01-03 DIAGNOSIS — G89.29 CHRONIC BILATERAL LOW BACK PAIN WITHOUT SCIATICA: Primary | ICD-10-CM

## 2023-01-03 DIAGNOSIS — M54.50 CHRONIC BILATERAL LOW BACK PAIN WITHOUT SCIATICA: Primary | ICD-10-CM

## 2023-01-03 NOTE — PROGRESS NOTES
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  Objective: See treatment diary below  Lumbar AROM: Flexion WFL midrange pain, end range relief  Extension Min-trace loss  B SGIS WFL "hurts so good "       Assessment: Tolerated treatment fair  Patient demo nil adverse effects leaving session  Discussed pain management and general progression with either success or lack thereof wiht mechanical traction into her POC  Discussed with primary PT  all in agreement  Pt plans to call her primary PT tomorrow ot relay information related to results of having had mechnaical traction today         Plan: F/u wiht primary PT     Precautions:   Past Medical History:   Diagnosis Date   • Achilles bursitis of left lower extremity    • Anxiety    • Asthma    • Depression    • Primary hypertension            date 2022 12/15/2022 1/3/23  2022   Visit #/auth (exp 23)            Manual 5'       Prone P to [employer]w - see above       Lumbar opening in side lying        R glut TP rel prone w/ hip ER/IR        R QL TP release in L SL     TT 5'   Neuro Re-Ed 10'    25'   Sit QL stretch after TP release     10"x3   MARIXA against counter        MARIXA in JORDY against wall        EIL from qped        Sciatic NG's 2x10 R/L    1x10 R/L   bridges        JORDY w/ B/L knee flexion        qped thread the needle        Supine tabletop on pball bridge     5"x10   Serratus rollups     2x10   supermans at wall     5"x10   Monster walk     YTB @ ankles 15'x1 fwd/bkwd   Side elbow plank ups     5"x5; 2x   Ther Ex     10'   Sit thoracic extension        SL open books         clamshells        Reverse clamshells        Wall slides         Cat/camel     1x10   UE flexion supine w/ cane        cerv retr w/ OP     5"x5   Towel rotat SNAG cerv     10"x5 r/L   mech assessment 30'       HEP  15' discussing self management following today's session and POC progression      Ther Activity                        Gait Training                        Modalities          Mechanical Traction 10' static 6 ramp up/down 80lbs pull, 15' edu and set up/break down   Mechanical Traction 10' static 6 ramp up/down 65lbs pull, 15' edu and set up/break down           Access Code: [email or web address]  URL: [email or web address]  Date: 12/01/2022  Prepared by:  Radha De León    Exercises  • Prone Knee Flexion AROM - 1 x daily - 2 sets - 10 reps  • Cat Cow - 1 x daily - 2 sets - 10 reps  • Quadruped Full Range Thoracic Rotation with Reach - 1 x daily - 2 sets - 10 reps  • Sidelying Reverse Clamshell - 1 x daily - 2 sets - 10 reps  • Clamshell - 1 x daily - 2 sets - 10 reps  • Supine Shoulder Flexion Extension AAROM with Dowel - 1 x daily - 10 reps - 5 hold  • Supine Bridge with Resistance Band - 1 x daily - 1 sets - 10 reps  • Supine Sciatic Nerve Glide - 1 x daily - 2 sets - 10 reps  • Seated Piriformis Stretch with Trunk Bend - 1 x daily - 3 reps - 20 hold  • Seated Thoracic Lumbar Extension - 1 x daily - 2 sets - 10 reps  • Seated Passive Cervical Retraction - 1 x daily - 10 reps - 5 hold  • Seated Assisted Cervical Rotation with Towel - 1 x daily - 5 reps - 10 hold

## 2023-01-05 ENCOUNTER — OFFICE VISIT (OUTPATIENT)
Dept: PHYSICAL THERAPY | Facility: CLINIC | Age: 48
End: 2023-01-05

## 2023-01-05 ENCOUNTER — APPOINTMENT (OUTPATIENT)
Dept: PHYSICAL THERAPY | Facility: CLINIC | Age: 48
End: 2023-01-05

## 2023-01-05 DIAGNOSIS — G89.29 CHRONIC BILATERAL LOW BACK PAIN WITHOUT SCIATICA: Primary | ICD-10-CM

## 2023-01-05 DIAGNOSIS — M54.50 CHRONIC BILATERAL LOW BACK PAIN WITHOUT SCIATICA: Primary | ICD-10-CM

## 2023-01-05 NOTE — PROGRESS NOTES
Daily Note     Today's date: 2023  Patient name: Rula Norman  : 1975  MRN: [id number]  Referring provider: Inez Buck MD  Dx:   Encounter Diagnosis     ICD-10-CM    1  Chronic bilateral low back pain without sciatica  M54 50     G89 29                      Subjective: Pt reports her distal symptoms are 30% better and she feels very pleased with pain reduction and tingling reduction  Finally feels optimistic  Objective: See treatment diary below      Assessment: Tolerated treatment well  Patient overall feels better following therapy  Plan: Continue per plan of care    Pt will f/u to conifrm consistency at which point will add exercise variables next week     Precautions:   Past Medical History:   Diagnosis Date   • Achilles bursitis of left lower extremity    • Anxiety    • Asthma    • Depression    • Primary hypertension            date 2022 12/15/2022 1/3/23 1/5/23    Visit #/auth (exp 23)             Manual 5'       Prone P to [employer]w - see above       Lumbar opening in side lying        R glut TP rel prone w/ hip ER/IR        R QL TP release in L SL        Neuro Re-Ed 10'       Sit QL stretch after TP release        MARIXA against counter        MARIXA in JORDY against wall        EIL from qped        Sciatic NG's 2x10 R/L       bridges        JORDY w/ B/L knee flexion        qped thread the needle        Supine tabletop on pball bridge        Serratus rollups        supermans at wall        [facility] walk        Side elbow plank ups        Ther Ex        Sit thoracic extension        SL open books         clamshells        Reverse clamshells        Wall slides         Cat/camel        UE flexion supine w/ cane        cerv retr w/ OP        Towel rotat SNAG cerv        mech assessment 30'       HEP  15' discussing self management following today's session and POC progression      Ther Activity                        Gait Training Modalities          Mechanical Traction 10' static 6 ramp up/down 80lbs pull, 15' edu and set up/break down Mechanical Traction 10' static 6 ramp up/down 65lbs pull, 15' edu and set up/break down Mechanical Traction 10' static 6 ramp up/down 65lbs pull, 15' edu and set up/break down            Access Code: [email or web address]  URL: [email or web address]  Date: 12/01/2022  Prepared by:  Yung Lee    Exercises  • Prone Knee Flexion AROM - 1 x daily - 2 sets - 10 reps  • Cat Cow - 1 x daily - 2 sets - 10 reps  • Quadruped Full Range Thoracic Rotation with Reach - 1 x daily - 2 sets - 10 reps  • Sidelying Reverse Clamshell - 1 x daily - 2 sets - 10 reps  • Clamshell - 1 x daily - 2 sets - 10 reps  • Supine Shoulder Flexion Extension AAROM with Dowel - 1 x daily - 10 reps - 5 hold  • Supine Bridge with Resistance Band - 1 x daily - 1 sets - 10 reps  • Supine Sciatic Nerve Glide - 1 x daily - 2 sets - 10 reps  • Seated Piriformis Stretch with Trunk Bend - 1 x daily - 3 reps - 20 hold  • Seated Thoracic Lumbar Extension - 1 x daily - 2 sets - 10 reps  • Seated Passive Cervical Retraction - 1 x daily - 10 reps - 5 hold  • Seated Assisted Cervical Rotation with Towel - 1 x daily - 5 reps - 10 hold

## 2023-01-10 ENCOUNTER — OFFICE VISIT (OUTPATIENT)
Dept: PULMONOLOGY | Facility: MEDICAL CENTER | Age: 48
End: 2023-01-10

## 2023-01-10 ENCOUNTER — OFFICE VISIT (OUTPATIENT)
Dept: PHYSICAL THERAPY | Facility: CLINIC | Age: 48
End: 2023-01-10

## 2023-01-10 VITALS
RESPIRATION RATE: 12 BRPM | TEMPERATURE: 98.4 F | SYSTOLIC BLOOD PRESSURE: 122 MMHG | HEIGHT: 68 IN | OXYGEN SATURATION: 98 % | BODY MASS INDEX: 28.37 KG/M2 | HEART RATE: 83 BPM | DIASTOLIC BLOOD PRESSURE: 82 MMHG | WEIGHT: 187.2 LBS

## 2023-01-10 DIAGNOSIS — G89.29 CHRONIC BILATERAL LOW BACK PAIN WITHOUT SCIATICA: Primary | ICD-10-CM

## 2023-01-10 DIAGNOSIS — G47.33 OSA (OBSTRUCTIVE SLEEP APNEA): Primary | ICD-10-CM

## 2023-01-10 DIAGNOSIS — I10 PRIMARY HYPERTENSION: ICD-10-CM

## 2023-01-10 DIAGNOSIS — M54.50 CHRONIC BILATERAL LOW BACK PAIN WITHOUT SCIATICA: Primary | ICD-10-CM

## 2023-01-10 LAB
DME PARACHUTE DELIVERY DATE REQUESTED: NORMAL
DME PARACHUTE ITEM DESCRIPTION: NORMAL
DME PARACHUTE ORDER STATUS: NORMAL
DME PARACHUTE SUPPLIER NAME: NORMAL
DME PARACHUTE SUPPLIER PHONE: NORMAL

## 2023-01-10 NOTE — ASSESSMENT & PLAN NOTE
She has mild obstructive Paulie obstructive sleep apnea with ZAY 13 diagnosed via HST in 10/2022  She has been started on auto CPAP 5-20cm H2O  Based on compliance data report she is doing well with the machine with 77% days used>4 hours  Average use is 5 hours and 30 minutes  Median pressure is 7 1   95th percentile pressure 10 2  Residual AHI 1 5  Median leak is 4 8 L/min  Leak appears leak appears to have been improving ever since she switch her mask to a fullface from nasal     She is gaining symptomatic benefit from the use of the CPAP including decrease occluding decrease morning headaches including decreased morning headaches, decreased flushing sensation in the morning  Less overnight awakenings  Better sleep maintenance  She is still trying to get used to the machine  She has indication for treatment due to concurrent hypertension  Based on compliance data, to avoid inadequate and excessive pressures, I have narrowed her pressure range to 7-11 cm H2O  She can return for follow up in 6 months to see if CPAP is still working well for her
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
## 2023-01-10 NOTE — PROGRESS NOTES
PT Re-Evaluation     Today's date: 1/10/2023  Patient name: Magali Mendoza  : 1975  MRN: [id number]  Referring provider: Kj Wood MD  Dx:   Encounter Diagnosis     ICD-10-CM    1  Chronic bilateral low back pain without sciatica  M54 50     G89 29                      Assessment  Assessment details: Pt has been participating in skilled PT since time of FREDA with very little improvement aside form very recent progress as it relates to implementation of mechanical traction  At this time pt is showing an improvement in gait, strength and AROM and would benefit form ongoing skilled PT to progress toward her est LTGs which she has yet to reach  Impairments: abnormal or restricted ROM, activity intolerance, impaired physical strength, lacks appropriate home exercise program, pain with function, weight-bearing intolerance, poor posture  and poor body mechanics  Other impairment: poor tolerance to prolonged static positions  Functional limitations: limited tolerance to sitting/driving/bendingBarriers to therapy: Await approval by insurer for continued care  Understanding of Dx/Px/POC: good   Prognosis: good    Goals  Short Term Goals: -partially met/onoging  1  Initiate and advance HEP toward self symptom reduction/resolution  2  Improve postural awareness and demonstrate self postural correction  3  Improve AROM lumbar spine to min henry or better t/o   4  Sleep undisturbed due to pain  5  Pt to report pain as intermittent instead of constant  Long Term Goals:  Ongoing  1  Indep with HEP and self symptom prevention  2  Achieve negative SLR and normal hamstring flexibility to help allow lumbar AROM to WNL w/o c/o   3  Improve LE/core strength to good to allow pt to tolerate running needed to work as a     4  Pt able to tolerate driving 5 hours w/ pain 0-3/10   5  Improve FOTO score to 61 or better to allow floor to waist lifting and raising from prolonged sitting w/ little to no difficulty  Plan  Patient would benefit from: skilled PT and PT eval  Planned modality interventions: thermotherapy: hydrocollator packs, unattended electrical stimulation and traction  Planned therapy interventions: joint mobilization, patient education, postural training, abdominal trunk stabilization, functional ROM exercises, home exercise program, neuromuscular re-education, strengthening, stretching, therapeutic activities and therapeutic exercise  Other planned therapy interventions: mechanical assessment  Frequency: 2x week  Duration in weeks: 8  Treatment plan discussed with: patient        Subjective Evaluation    History of Present Illness  Date of onset: 10/14/2021  Mechanism of injury: Pt reports that she has finally seen progress at this time and is very pleased with her overall improvements  She feels she is back to 20% of her PLOF which she feels is  A major improvement  She has soreness today after training and competing with her dogs all weekend  She has optimism at this time  Pain  Current pain ratin  At best pain ratin  At worst pain ratin  Relieving factors: change in position  Exacerbated by: prolonged driving/sitting  Progression: worsening      Diagnostic Tests  Abnormal x-ray: DDD  Abnormal MRI: scheduled for     Treatments  Previous treatment: medication  Current treatment: physical therapy  Patient Goals  Patient goals for therapy: decreased pain, independence with ADLs/IADLs, return to sport/leisure activities, increased strength and increased motion          Objective  OBSERVATION:  2022 - dec lumbar lordosis in standing; no lateral shift        DERMATOMAL TESTING:      MYOTOMAL TESTIN2022- same at 1/10/23      Right  left  L2-3 Hip flexion:  4/5*  4+/5L  L3-4 Knee exten:  4-/5   4/5*  L5 Great toe exten:  5  5/5  L4 Heel walk/DF:    5/  S1 toe walk/PF/ever:    5/  S2 knee flex:   4-/5*  4/5            LUMBAR AROM: 01/10/2023  Flexion   min henry*  Extension  Min-mod henry*  R sideglide  min henry  L sideglide  Min henry                 Precautions:   Past Medical History:   Diagnosis Date   • Achilles bursitis of left lower extremity    • Anxiety    • Asthma    • Depression    • Primary hypertension      Access Code: [email or web address]  URL: [email or web address]  Date: 11/17/2022  Prepared by:  Yung Lee    Exercises  Standing Lumbar Extension with Counter - 6 x daily - 2 sets - 10 reps  Standing Lumbar Extension at [address] - 6 x daily - 2 sets - 10 reps  Quadruped Rock Back into [employer] Up - 6 x daily - 10 reps        date 12/15/2022 1/3/23 1/5/23 1/10/23    Visit #/auth (exp 1/18/23) 7/12 8/12 9/12 10/12            Manual        Prone P to A mobs        Lumbar opening in side lying        R glut TP rel prone w/ hip ER/IR        R QL TP release in L SL        Neuro Re-Ed        Sit QL stretch after TP release        MARIXA against counter        MARIXA in JORDY against wall        EIL from qped        Sciatic NG's    10x ea    bridges        JORDY w/ B/L knee flexion        qped thread the needle        Supine tabletop on pball bridge        Serratus rollups        supermans at [facility] walk        Side elbow plank ups        Ther Ex        Sit thoracic extension        SL open books         clamshells        Reverse clamshells        Wall slides         Cat/camel    12x    UE flexion supine w/ cane        cerv retr w/ OP        Towel rotat SNAG cerv        mech assessment        HEP 15' discussing self management following today's session and POC progression       Ther Activity                        Gait Training                        Modalities         Mechanical Traction 10' static 6 ramp up/down 80lbs pull, 15' edu and set up/break down Mechanical Traction 10' static 6 ramp up/down 65lbs pull, 15' edu and set up/break down Mechanical Traction 10' static 6 ramp up/down 65lbs pull, 15' edu and set up/break down Mechanical Traction 10' static 6 ramp up/down 65lbs pull, 15' edu and set up/break down

## 2023-01-10 NOTE — PATIENT INSTRUCTIONS
1   Continue use of CPAP equipment nightly - use any time you are laying down to rest, watch TV, etc to increase use and in case you fall asleep to prevent falling asleep without it  2  If air is too hot, turn down the tubing heating setting  3  If excessive dry mouth in the morning, turn up humidifier setting and be sure to only use distilled water in your humidifier  4   Change your filter once a month and wash the non-disposable filter  5  Continue to clean your equipment, as discussed, using mild soap and water  You can use unscented baby wipe on the mask  6   Contact the [facility] with any questions or concerns prior to your next visit, as needed  7  Schedule visit for follow-up in 6 months  You should see your sleep physician at least once a year  Nursing Support:  When: Monday through Friday 7A-5PM except holidays  Where: Our direct line is [phone]  If you are having a true emergency please call 911  In the event that the line is busy or it is after hours please leave a voice message and we will return your call  Please speak clearly, leaving your full name, birth date, best number to reach you and the reason for your call  Medication refills: We will need the name of the medication, the dosage, the ordering provider, whether you get a 30 or 90 day refill, and the pharmacy name and address  Medications will be ordered by the provider only  Nurses cannot call in prescriptions  Please allow 7 days for medication refills  Physician requested updates: If your provider requested that you call with an update after starting medication, please be ready to provide us the medication and dosage, what time you take your medication, the time you attempt to fall asleep, time you fall asleep, when you wake up, and what time you get out of bed  Sleep Study Results: We will contact you with sleep study results and/or next steps after the physician has reviewed your testing

## 2023-01-12 ENCOUNTER — OFFICE VISIT (OUTPATIENT)
Dept: PHYSICAL THERAPY | Facility: CLINIC | Age: 48
End: 2023-01-12

## 2023-01-12 DIAGNOSIS — G89.29 CHRONIC BILATERAL LOW BACK PAIN WITHOUT SCIATICA: Primary | ICD-10-CM

## 2023-01-12 DIAGNOSIS — M54.50 CHRONIC BILATERAL LOW BACK PAIN WITHOUT SCIATICA: Primary | ICD-10-CM

## 2023-01-12 NOTE — PROGRESS NOTES
Daily Note     Today's date: 2023  Patient name: Sabrina Meigs  : 1975  MRN: [id number]  Referring provider: Saeed Jeffrey MD  Dx:   Encounter Diagnosis     ICD-10-CM    1  Chronic bilateral low back pain without sciatica  M54 50     G89 29                      Subjective: Pt reports she is extremely pleased with feeling significantly better  Pt previously reports 20-30% improvement and today reports this is the same  Pt reports her symptoms of stiffness are greatest in morning but reduce as day goes on  She reports tingling in her foot is improving  She has an agility competition with her dogs this weekend  Objective: See treatment diary below  Adding bridging therex today  Assessment: Tolerated treatment well  Patient always experiences "jelly legs" following session which abates with movement and walking, nil adverse reactions, attributed to deep relaxation unloaded  Pt tolerated session with progressed therex successfully  Plan: Continue per plan of care  Precautions:   Past Medical History:   Diagnosis Date   • Achilles bursitis of left lower extremity    • Anxiety    • Asthma    • Depression    • Primary hypertension      Access Code: [email or web address]  URL: [email or web address]  Date: 2022  Prepared by:  Aletha Jenkins    Exercises  Standing Lumbar Extension with Counter - 6 x daily - 2 sets - 10 reps  Standing Lumbar Extension at [address] - 6 x daily - 2 sets - 10 reps  Quadruped Rock Back into [employer] Up - 6 x daily - 10 reps        date 12/15/2022 1/3/23 1/5/23 1/10/23 1/12/23   Visit #/auth AUTO 7/12 8/12 9/12 10/12 11/12           Manual        Prone P to A mobs        Lumbar opening in side lying        R glut TP rel prone w/ hip ER/IR        R QL TP release in L SL        Neuro Re-Ed        Sit QL stretch after TP release        MARIXA against counter        MARIXA in JORDY against wall        EIL from qped        Sciatic NG's    10x ea 10x bridges     10x   JORDY w/ B/L knee flexion        qped thread the needle        Supine tabletop on pball bridge        Serratus rollups        supermans at wall        [facility] walk        Side elbow plank ups        Ther Ex        Sit thoracic extension        SL open books         clamshells        Reverse clamshells        Wall slides         Cat/camel    12x 15x   UE flexion supine w/ cane        cerv retr w/ OP        Towel rotat SNAG cerv        mech assessment        HEP 15' discussing self management following today's session and POC progression       Ther Activity                        Gait Training                        Modalities         Mechanical Traction 10' static 6 ramp up/down 80lbs pull, 15' edu and set up/break down Mechanical Traction 10' static 6 ramp up/down 65lbs pull, 15' edu and set up/break down Mechanical Traction 10' static 6 ramp up/down 65lbs pull, 15' edu and set up/break down Mechanical Traction 10' static 6 ramp up/down 65lbs pull, 15' edu and set up/break down Mechanical Traction 10' static 6 ramp up/down 65lbs pull, 15' edu and set up/break down

## 2023-01-17 ENCOUNTER — APPOINTMENT (OUTPATIENT)
Dept: PHYSICAL THERAPY | Facility: CLINIC | Age: 48
End: 2023-01-17

## 2023-01-18 ENCOUNTER — OFFICE VISIT (OUTPATIENT)
Dept: FAMILY MEDICINE CLINIC | Facility: CLINIC | Age: 48
End: 2023-01-18

## 2023-01-18 VITALS
BODY MASS INDEX: 28.19 KG/M2 | RESPIRATION RATE: 14 BRPM | SYSTOLIC BLOOD PRESSURE: 126 MMHG | TEMPERATURE: 97.8 F | HEIGHT: 68 IN | WEIGHT: 186 LBS | DIASTOLIC BLOOD PRESSURE: 84 MMHG | HEART RATE: 62 BPM

## 2023-01-18 DIAGNOSIS — M54.41 CHRONIC MIDLINE LOW BACK PAIN WITH RIGHT-SIDED SCIATICA: Primary | ICD-10-CM

## 2023-01-18 DIAGNOSIS — Z12.11 SCREENING FOR COLON CANCER: Primary | ICD-10-CM

## 2023-01-18 DIAGNOSIS — G89.29 CHRONIC MIDLINE LOW BACK PAIN WITH RIGHT-SIDED SCIATICA: Primary | ICD-10-CM
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
## 2023-01-19 ENCOUNTER — APPOINTMENT (OUTPATIENT)
Dept: PHYSICAL THERAPY | Facility: CLINIC | Age: 48
End: 2023-01-19

## 2023-01-23 ENCOUNTER — LAB (OUTPATIENT)
Dept: LAB | Facility: CLINIC | Age: 48
End: 2023-01-23

## 2023-01-23 DIAGNOSIS — I10 PRIMARY HYPERTENSION: Primary | ICD-10-CM

## 2023-01-23 LAB
ANION GAP SERPL CALCULATED.3IONS-SCNC: 7 MMOL/L (ref 4–13)
BUN SERPL-MCNC: 26 MG/DL (ref 5–25)
CALCIUM SERPL-MCNC: 9 MG/DL (ref 8.3–10.1)
CHLORIDE SERPL-SCNC: 103 MMOL/L (ref 96–108)
CO2 SERPL-SCNC: 27 MMOL/L (ref 21–32)
CREAT SERPL-MCNC: 0.92 MG/DL (ref 0.6–1.3)
GFR SERPL CREATININE-BSD FRML MDRD: 73 ML/MIN/1.73SQ M
GLUCOSE P FAST SERPL-MCNC: 91 MG/DL (ref 65–99)
POTASSIUM SERPL-SCNC: 4.3 MMOL/L (ref 3.5–5.3)
SODIUM SERPL-SCNC: 137 MMOL/L (ref 135–147)

## 2023-01-23 NOTE — TELEPHONE ENCOUNTER
[facility] FP sandwich letter mailed on 4/9/2018 for [employer] program  Patient has never been seen for Annual Well visit 
no

## 2023-01-24 ENCOUNTER — APPOINTMENT (OUTPATIENT)
Dept: PHYSICAL THERAPY | Facility: CLINIC | Age: 48
End: 2023-01-24

## 2023-01-24 DIAGNOSIS — G89.29 CHRONIC LEFT-SIDED LOW BACK PAIN WITH RIGHT-SIDED SCIATICA: ICD-10-CM

## 2023-01-24 DIAGNOSIS — M54.50 CHRONIC BILATERAL LOW BACK PAIN WITHOUT SCIATICA: ICD-10-CM

## 2023-01-24 DIAGNOSIS — M54.41 CHRONIC LEFT-SIDED LOW BACK PAIN WITH RIGHT-SIDED SCIATICA: ICD-10-CM

## 2023-01-24 DIAGNOSIS — G89.29 CHRONIC BILATERAL LOW BACK PAIN WITHOUT SCIATICA: Primary | ICD-10-CM

## 2023-01-24 DIAGNOSIS — M54.50 CHRONIC BILATERAL LOW BACK PAIN WITHOUT SCIATICA: Primary | ICD-10-CM

## 2023-01-24 DIAGNOSIS — I10 PRIMARY HYPERTENSION: ICD-10-CM

## 2023-01-24 DIAGNOSIS — G89.29 CHRONIC BILATERAL LOW BACK PAIN WITHOUT SCIATICA: ICD-10-CM

## 2023-01-24 NOTE — PROGRESS NOTES
Daily Note     Today's date: 2023  Patient name: Bradford Del Cid  : 1975  MRN: [id number]  Referring provider: Anjelica Luque MD  Dx:   Encounter Diagnosis     ICD-10-CM    1  Chronic bilateral low back pain without sciatica  M54 50     G89 29                    Subjective: Pt reports she is extremely pleased with feeling significantly better  Pt previously reports 20-30% improvement and today reports this is the same  Pt reports her symptoms of stiffness are greatest in morning but reduce as day goes on  She reports tingling in her foot is improving  She has an agility competition with her dogs this weekend  Objective: See treatment diary below  Adding bridging therex today  Assessment: Tolerated treatment well  Patient always experiences "jelly legs" following session which abates with movement and walking, nil adverse reactions, attributed to deep relaxation unloaded  Pt tolerated session with progressed therex successfully  Plan: Continue per plan of care  Precautions:   Past Medical History:   Diagnosis Date   • Achilles bursitis of left lower extremity    • Anxiety    • Asthma    • Depression    • Primary hypertension      Access Code: [email or web address]  URL: [email or web address]  Date: 2022  Prepared by:  Aletha Jenkins    Exercises  Standing Lumbar Extension with Counter - 6 x daily - 2 sets - 10 reps  Standing Lumbar Extension at [address] - 6 x daily - 2 sets - 10 reps  Quadruped Rock Back into [employer] Up - 6 x daily - 10 reps    date 1/3/23 1/5/23 1/10/23 1/12/23 1/24/23   Visit #/auth AUTO 8/12 9/12 10/12 11/12 12/12           Manual        Prone P to A mobs        Lumbar opening in side lying        R glut TP rel prone w/ hip ER/IR        R QL TP release in L SL        Neuro Re-Ed        EIL from qped        Sciatic NG's   10x ea 10x    bridges    10x    Ther Ex        Cat/camel   12x 15x    Ther Activity                        Gait Training Modalities         Mechanical Traction 10' static 6 ramp up/down 65lbs pull, 15' edu and set up/break down Mechanical Traction 10' static 6 ramp up/down 65lbs pull, 15' edu and set up/break down Mechanical Traction 10' static 6 ramp up/down 65lbs pull, 15' edu and set up/break down Mechanical Traction 10' static 6 ramp up/down 65lbs pull, 15' edu and set up/break down

## 2023-01-25 RX ORDER — AMLODIPINE BESYLATE 5 MG/1
5 TABLET ORAL DAILY
Qty: 90 TABLET | Refills: 0 | Status: SHIPPED | OUTPATIENT
Start: 2023-01-25

## 2023-01-25 RX ORDER — BACLOFEN 10 MG/1
10 TABLET ORAL 3 TIMES DAILY
Qty: 90 TABLET | Refills: 0 | Status: SHIPPED | OUTPATIENT
Start: 2023-01-25

## 2023-01-25 RX ORDER — GABAPENTIN 300 MG/1
300 CAPSULE ORAL
Qty: 30 CAPSULE | Refills: 0 | Status: SHIPPED | OUTPATIENT
Start: 2023-01-25

## 2023-01-25 RX ORDER — HYDROCHLOROTHIAZIDE 12.5 MG/1
12.5 TABLET ORAL DAILY
Qty: 30 TABLET | Refills: 0 | Status: SHIPPED | OUTPATIENT
Start: 2023-01-25

## 2023-01-26 ENCOUNTER — TELEPHONE (OUTPATIENT)
Dept: FAMILY MEDICINE CLINIC | Facility: CLINIC | Age: 48
End: 2023-01-26

## 2023-01-26 ENCOUNTER — APPOINTMENT (OUTPATIENT)
Dept: PHYSICAL THERAPY | Facility: CLINIC | Age: 48
End: 2023-01-26

## 2023-01-26 NOTE — TELEPHONE ENCOUNTER
I spoke to pt and she stated that the INS documentation she received is stopping PT all together  I explained to the pt that she will need to contact the Claims rep and see if that is what it is meaning and to see if there is anything that needs to be done in order to reinstate the pt
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
## 2023-01-26 NOTE — TELEPHONE ENCOUNTER
I spoke to pt again she found a denial that states if the treating provider disagrees with the INS discission the treat provider can appeal  I asked the pt to bring the letter in to make a copy so that I can ask Dr Dianne Sandoval  I told he in the meantime to contact PT to see if they are considered the treating providers or is Dr Dianne Sandoval

## 2023-01-31 ENCOUNTER — APPOINTMENT (OUTPATIENT)
Dept: PHYSICAL THERAPY | Facility: CLINIC | Age: 48
End: 2023-01-31

## 2023-02-02 ENCOUNTER — APPOINTMENT (OUTPATIENT)
Dept: PHYSICAL THERAPY | Facility: CLINIC | Age: 48
End: 2023-02-02

## 2023-02-02 ENCOUNTER — OFFICE VISIT (OUTPATIENT)
Dept: PHYSICAL THERAPY | Facility: CLINIC | Age: 48
End: 2023-02-02

## 2023-02-02 DIAGNOSIS — G89.29 CHRONIC BILATERAL LOW BACK PAIN WITHOUT SCIATICA: Primary | ICD-10-CM

## 2023-02-02 DIAGNOSIS — M54.50 CHRONIC BILATERAL LOW BACK PAIN WITHOUT SCIATICA: Primary | ICD-10-CM

## 2023-02-02 NOTE — PROGRESS NOTES
Daily Note     Today's date: 2023  Patient name: Santi Dinh  : 1975  MRN: [id number]  Referring provider: Jose Saul MD  Dx:   Encounter Diagnosis     ICD-10-CM    1  Chronic bilateral low back pain without sciatica  M54 50     G89 29                      Subjective: Pt reports overall she has been doing well, still has numbness in toes on both feet  She is returning to working out with  and overall feeling good  She does have significant stiffness upon waking in morning and has midline low back pain with flexion  Objective: See treatment diary below  Inc time spent reviewing HEP, includes hip and LE stretching, core stabilization and lower body strengthening in standing, prone, kneeling in dynamic and static challenges  Assessment: Tolerated treatment well  Patient generally feels she can incorporate exercise much better with added traction at this itme, overall feeling her L LE remains weakn funcitonally, however is progressing at this time  Plan: Continue per plan of care  Precautions:   Past Medical History:   Diagnosis Date   • Achilles bursitis of left lower extremity    • Anxiety    • Asthma    • Depression    • Primary hypertension      Access Code: [email or web address]  URL: [email or web address]  Date: 2022  Prepared by: Aletha Jenkins    Exercises  Standing Lumbar Extension with Counter - 6 x daily - 2 sets - 10 reps  Standing Lumbar Extension at [address] - 6 x daily - 2 sets - 10 reps  Quadruped Rock Back into [employer] Up - 6 x daily - 10 reps    Eval/ Re-eval Auth #/ Referral # Total visits Start date  Expiration date Total active units  Total manual units  PT only or  PT+OT?     Auth approved  --- 1/19/23 4/10/23 36                                                    Date: 23          Total authorized units:  Active: 3          36            Total remaining units:  Active:    33                           Date: Total authorized units: Active:            Manual:           Total remaining units:  Active:               Manual:                        date 1/3/23 1/5/23 1/10/23 1/12/23 2/2/23   Visit #/auth AUTO 8/12 9/12 10/12 11/12 UNITS           Manual        Prone P to A mobs        Lumbar opening in side lying        R glut TP rel prone w/ hip ER/IR        R QL TP release in L SL        Neuro Re-Ed        Sit QL stretch after TP release        MARIXA against counter        MARIXA in JORDY against wall        EIL from qped        Sciatic NG's   10x ea 10x    bridges    10x    JORDY w/ B/L knee flexion        qped thread the needle        Supine tabletop on pball bridge        Serratus rollups        supermans at wall        [facility] walk        Side elbow plank ups        Ther Ex        Sit thoracic extension        SL open books         clamshells        Reverse clamshells        Wall slides         Cat/camel   12x 15x    UE flexion supine w/ cane        cerv retr w/ OP        Towel rotat SNAG cerv        mech assessment        HEP        Ther Activity                        Gait Training                        Modalities         Mechanical Traction 10' static 6 ramp up/down 65lbs pull, 15' edu and set up/break down Mechanical Traction 10' static 6 ramp up/down 65lbs pull, 15' edu and set up/break down Mechanical Traction 10' static 6 ramp up/down 65lbs pull, 15' edu and set up/break down Mechanical Traction 10' static 6 ramp up/down 65lbs pull, 15' edu and set up/break down Mechanical Traction 10' static 6 ramp up/down 6lbs pull, 15' edu and set up/break down

## 2023-02-03 ENCOUNTER — TELEPHONE (OUTPATIENT)
Dept: FAMILY MEDICINE CLINIC | Facility: CLINIC | Age: 48
End: 2023-02-03

## 2023-02-03 DIAGNOSIS — I10 PRIMARY HYPERTENSION: Primary | ICD-10-CM

## 2023-02-03 NOTE — TELEPHONE ENCOUNTER
Patient did not take hydrochlorothiazide this morning as she is concerned about her sodium level  She is asking if she should get labs done to recheck before end of day today

## 2023-02-03 NOTE — TELEPHONE ENCOUNTER
Pt is aware and stated she doesn't feel well so she is going to possibly go some time today to have it drawn  I did explain to the pt that the levels do not show symptoms like that she disagreed

## 2023-02-03 NOTE — TELEPHONE ENCOUNTER
Dr Lorenz Life:    Patient called and is worried that her sodium is dropping  She's asking if you can place an order for labwork so she can go today to get it drawn? Please c/b to discuss further

## 2023-02-04 ENCOUNTER — APPOINTMENT (OUTPATIENT)
Dept: LAB | Facility: HOSPITAL | Age: 48
End: 2023-02-04
Attending: FAMILY MEDICINE

## 2023-02-04 DIAGNOSIS — I10 ESSENTIAL HYPERTENSION, MALIGNANT: Primary | ICD-10-CM

## 2023-02-04 LAB
ANION GAP SERPL CALCULATED.3IONS-SCNC: 5 MMOL/L (ref 4–13)
BUN SERPL-MCNC: 20 MG/DL (ref 5–25)
CALCIUM SERPL-MCNC: 8.3 MG/DL (ref 8.3–10.1)
CHLORIDE SERPL-SCNC: 106 MMOL/L (ref 96–108)
CO2 SERPL-SCNC: 30 MMOL/L (ref 21–32)
CREAT SERPL-MCNC: 0.82 MG/DL (ref 0.6–1.3)
GFR SERPL CREATININE-BSD FRML MDRD: 84 ML/MIN/1.73SQ M
GLUCOSE P FAST SERPL-MCNC: 103 MG/DL (ref 65–99)
POTASSIUM SERPL-SCNC: 4.4 MMOL/L (ref 3.5–5.3)
SODIUM SERPL-SCNC: 141 MMOL/L (ref 135–147)

## 2023-02-07 ENCOUNTER — APPOINTMENT (OUTPATIENT)
Dept: PHYSICAL THERAPY | Facility: CLINIC | Age: 48
End: 2023-02-07

## 2023-02-09 ENCOUNTER — APPOINTMENT (OUTPATIENT)
Dept: PHYSICAL THERAPY | Facility: CLINIC | Age: 48
End: 2023-02-09

## 2023-02-14 ENCOUNTER — APPOINTMENT (OUTPATIENT)
Dept: PHYSICAL THERAPY | Facility: CLINIC | Age: 48
End: 2023-02-14

## 2023-03-18 DIAGNOSIS — G89.29 CHRONIC BILATERAL LOW BACK PAIN WITHOUT SCIATICA: ICD-10-CM

## 2023-03-18 DIAGNOSIS — M54.41 CHRONIC LEFT-SIDED LOW BACK PAIN WITH RIGHT-SIDED SCIATICA: ICD-10-CM

## 2023-03-18 DIAGNOSIS — M54.50 CHRONIC BILATERAL LOW BACK PAIN WITHOUT SCIATICA: ICD-10-CM

## 2023-03-18 DIAGNOSIS — I10 PRIMARY HYPERTENSION: ICD-10-CM

## 2023-03-18 DIAGNOSIS — G89.29 CHRONIC LEFT-SIDED LOW BACK PAIN WITH RIGHT-SIDED SCIATICA: ICD-10-CM

## 2023-03-18 RX ORDER — HYDROCHLOROTHIAZIDE 12.5 MG/1
12.5 TABLET ORAL DAILY
Qty: 30 TABLET | Refills: 0 | Status: SHIPPED | OUTPATIENT
Start: 2023-03-18

## 2023-03-18 RX ORDER — GABAPENTIN 300 MG/1
300 CAPSULE ORAL
Qty: 30 CAPSULE | Refills: 0 | Status: SHIPPED | OUTPATIENT
Start: 2023-03-18

## 2023-03-18 RX ORDER — BACLOFEN 10 MG/1
10 TABLET ORAL 3 TIMES DAILY
Qty: 90 TABLET | Refills: 0 | Status: SHIPPED | OUTPATIENT
Start: 2023-03-18

## 2023-03-28 ENCOUNTER — EVALUATION (OUTPATIENT)
Dept: PHYSICAL THERAPY | Facility: CLINIC | Age: 48
End: 2023-03-28

## 2023-03-28 DIAGNOSIS — M54.41 CHRONIC LEFT-SIDED LOW BACK PAIN WITH RIGHT-SIDED SCIATICA: Primary | ICD-10-CM

## 2023-03-28 DIAGNOSIS — G89.29 CHRONIC LEFT-SIDED LOW BACK PAIN WITH RIGHT-SIDED SCIATICA: Primary | ICD-10-CM

## 2023-03-28 NOTE — PROGRESS NOTES
Daily Note - RE-Eval    Today's date: 3/28/2023  Patient name: Emery Richards  : 1975  MRN: [id number]  Referring provider: Franny Noble MD  Dx:   Encounter Diagnosis     ICD-10-CM    1  Chronic left-sided low back pain with right-sided sciatica  M54 41     G89 29                      Subjective: Pt is reporting that she has been regularly exercising, primarily focusing on core strength  At this time she reports she is 75% near her PLOF, but still has deficits  She still have numbness in her toes  She feels she is below this when she is unable to exercise  Pt feels that her deficits are mostly correlated to the ongoing need to build back up, pain and strength both but one more than the other day depending  Disturbed sleep ongoing, not directly related to this pain (partially)  Pt still taking gabapentin  Pt has not been running lately, but last time she was running, she felt she was improved but had a difference in her ability to bring R LE forward  Pt pain rating ranges from 0-6/10  Objective: See treatment diary below  Goals  Short Term Goals: -partially met  1  Initiate and advance HEP toward self symptom reduction/resolution  2  Improve postural awareness and demonstrate self postural correction  3  Improve AROM lumbar spine to min henry or better t/o   4  Sleep undisturbed due to pain  5  Pt to report pain as intermittent instead of constant  Long Term Goals:  Partially met  1  Indep with HEP and self symptom prevention  2  Achieve negative SLR and normal hamstring flexibility to help allow lumbar AROM to WNL w/o c/o   3  Improve LE/core strength to good to allow pt to tolerate running needed to work as a   4  Pt able to tolerate driving 5 hours w/ pain 0-3/10   5  Improve FOTO score to 61 or better to allow floor to waist lifting and raising from prolonged sitting w/ little to no difficulty  Assessment: Tolerated treatment well   Patient has had >1 month hiatus from therapy and has persevered with HEP and exercises prescribed by her  while she has been traveling for work  At this time pt demo major LS AROM improvements as well as strength gains in LEs however her overall functional mobility has not returned to her PLOF  She still has numbness in her toes and pain as well as disturbed sleep  She has yet to return to full activity as per pain onset  At this time pt will cont with self management of her symptoms  Plan: Return PRN  Pt has Achilles pain as well may return to therapy independent of this low back pain  Precautions:   Past Medical History:   Diagnosis Date   • Achilles bursitis of left lower extremity    • Anxiety    • Asthma    • Depression    • Primary hypertension      Access Code: [email or web address]  URL: [email or web address]  Date: 11/17/2022  Prepared by: Aletha Jenkins    Exercises  Standing Lumbar Extension with Counter - 6 x daily - 2 sets - 10 reps  Standing Lumbar Extension at [address] - 6 x daily - 2 sets - 10 reps  Quadruped Rock Back into [employer] Up - 6 x daily - 10 reps    Eval/ Re-eval Auth #/ Referral # Total visits Start date  Expiration date Total active units  Total manual units  PT only or  PT+OT?     Auth approved  --- 1/19/23 4/10/23 36                                                    Date: 2/2/23 3/28         Total authorized units:  Active: 3 3         36            Total remaining units:  Active:    33 30                          Date:           Total authorized units: Active:            Manual:           Total remaining units:  Active:               Manual:                        date 1/3/23 1/5/23 1/10/23 1/12/23 2/2/23 3/28/23   Visit #/auth AUTO 8/12 9/12 10/12 11/12 UNITS             Manual         Prone P to A mobs         Lumbar opening in side lying         R glut TP rel prone w/ hip ER/IR         R QL TP release in L SL         Neuro Re-Ed         Sit QL stretch after TP release MARIXA against counter         MARIXA in JORDY against wall         EIL from qped         Sciatic NG's   10x ea 10x     bridges    10x     JORDY w/ B/L knee flexion         qped thread the needle         Supine tabletop on pball bridge         Serratus rollups         supermans at wall         Monster walk         Side elbow plank ups         Ther Ex         Sit thoracic extension         SL open books          clamshells         Reverse clamshells         Wall slides          Cat/camel   12x 15x     UE flexion supine w/ cane         cerv retr w/ OP         Towel rotat SNAG cerv         mech assessment         HEP         Ther Activity                           Gait Training                           Modalities          Mechanical Traction 10' static 6 ramp up/down 65lbs pull, 15' edu and set up/break down Mechanical Traction 10' static 6 ramp up/down 65lbs pull, 15' edu and set up/break down Mechanical Traction 10' static 6 ramp up/down 65lbs pull, 15' edu and set up/break down Mechanical Traction 10' static 6 ramp up/down 65lbs pull, 15' edu and set up/break down Mechanical Traction 10' static 6 ramp up/down 6lbs pull, 15' edu and set up/break down

## 2023-03-30 ENCOUNTER — OFFICE VISIT (OUTPATIENT)
Dept: PHYSICAL THERAPY | Facility: CLINIC | Age: 48
End: 2023-03-30

## 2023-03-30 DIAGNOSIS — M25.572 LEFT ANKLE PAIN, UNSPECIFIED CHRONICITY: ICD-10-CM

## 2023-03-30 DIAGNOSIS — M79.672 PAIN OF LEFT HEEL: Primary | ICD-10-CM
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
## 2023-04-03 ENCOUNTER — TELEPHONE (OUTPATIENT)
Dept: PHYSICAL THERAPY | Facility: CLINIC | Age: 48
End: 2023-04-03

## 2023-04-03 ENCOUNTER — APPOINTMENT (OUTPATIENT)
Dept: PHYSICAL THERAPY | Facility: CLINIC | Age: 48
End: 2023-04-03

## 2023-04-03 NOTE — TELEPHONE ENCOUNTER
Patient called to cancel her appointment this afternoon due to needing to  her dog from the emergency vet  Rescheduled to Wednesday 4/5/23

## 2023-04-05 ENCOUNTER — OFFICE VISIT (OUTPATIENT)
Dept: PHYSICAL THERAPY | Facility: CLINIC | Age: 48
End: 2023-04-05

## 2023-04-05 DIAGNOSIS — M25.572 LEFT ANKLE PAIN, UNSPECIFIED CHRONICITY: Primary | ICD-10-CM

## 2023-04-05 DIAGNOSIS — M79.672 PAIN OF LEFT HEEL: ICD-10-CM

## 2023-04-05 NOTE — PROGRESS NOTES
"Daily Note     Today's date: 2023  Patient name: Fabby Penny  : 1975  MRN: [id number]  Referring provider: Self, Referral  Dx:   Encounter Diagnosis     ICD-10-CM    1  Left ankle pain, unspecified chronicity  M25 572       2  Pain of left heel  M79 672                      Subjective: pt reports that things are still sore, she did compete with her dog over the weekend  Seems there is some improvement since she was here last when she was very symptomatic but notes she did not do much of her HEP due to her dog being sick  Reports most of her discomfort remains in the heel itself but she has been getting some pain in the lateral ankle inferior to the lateral malleoli  Some aching in the heel on arrival even in sitting      Objective: See treatment diary below      Assessment: Tolerated treatment well  Pt challenged with [place] board, was fatigued with this  Good tolerance to PF rolling, edu to do this at home with frozen water bottle for similar relief  Pt dem decreased discomfort in her heel in standing and no pain in NWB post session  Patient would benefit from continued PT      Plan: Continue per plan of care        Precautions:   Past Medical History:   Diagnosis Date   • Achilles bursitis of left lower extremity    • Anxiety    • Asthma    • Depression    • Primary hypertension      SOC: 3/30/2023  FOTO: 3/30/2023  POC Expiration: 2023  Daily Treatment Log:  Date 3/30/2023 2023      Visit # 1  2       Manual LL       Ktape  Plantar fascia/achilles  Plantar fascia/achilles   RR       IASTM                 Ther Exer        Ankle circles   20x cw/ccw       Gastroc str w/ SOS  Achilles insertion pain       4 way ankle TB  YTB 2x10 ea       plantar fascia str  10\"x5       Therabar plantar fascia rolling   Green 3'                       HEP        Ther Activ                                                        NMReed        Toe scrunches towel   3\"x15       [place] board ROM  2x10 cw/ccw/AP/ML   " Arch raise                                                        Modalities                                  Access Code: [email or web address]  URL: [email or web address]  Date: 03/30/2023  Prepared by: Yobany Katz    Exercises  - Seated Plantar Fascia Stretch  - 1 x daily - 7 x weekly - 5 reps - 10 hold  - Long Sitting Calf Stretch with Strap  - 1 x daily - 7 x weekly - 5 reps - 10 hold  - Seated Ankle Circles  - 7 x weekly - 2 sets - 10 reps  - Supine Ankle Pumps  - 7 x weekly - 2 sets - 10 reps

## 2023-04-12 PROBLEM — E66.09 CLASS 1 OBESITY DUE TO EXCESS CALORIES WITH SERIOUS COMORBIDITY AND BODY MASS INDEX (BMI) OF 31.0 TO 31.9 IN ADULT: Status: RESOLVED | Noted: 2022-05-05 | Resolved: 2023-04-12

## 2023-04-12 PROBLEM — E66.811 CLASS 1 OBESITY DUE TO EXCESS CALORIES WITH SERIOUS COMORBIDITY AND BODY MASS INDEX (BMI) OF 31.0 TO 31.9 IN ADULT: Status: RESOLVED | Noted: 2022-05-05 | Resolved: 2023-04-12

## 2023-04-26 ENCOUNTER — OFFICE VISIT (OUTPATIENT)
Dept: CARDIOLOGY CLINIC | Facility: CLINIC | Age: 48
End: 2023-04-26

## 2023-04-26 VITALS
HEART RATE: 61 BPM | OXYGEN SATURATION: 99 % | WEIGHT: 186 LBS | SYSTOLIC BLOOD PRESSURE: 130 MMHG | HEIGHT: 68 IN | DIASTOLIC BLOOD PRESSURE: 80 MMHG | BODY MASS INDEX: 28.19 KG/M2

## 2023-04-26 DIAGNOSIS — I10 PRIMARY HYPERTENSION: Primary | ICD-10-CM
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
## 2023-04-27 ENCOUNTER — OFFICE VISIT (OUTPATIENT)
Dept: PHYSICAL THERAPY | Facility: CLINIC | Age: 48
End: 2023-04-27

## 2023-04-27 DIAGNOSIS — G89.29 CHRONIC LEFT-SIDED LOW BACK PAIN WITH RIGHT-SIDED SCIATICA: ICD-10-CM

## 2023-04-27 DIAGNOSIS — M79.672 PAIN OF LEFT HEEL: ICD-10-CM

## 2023-04-27 DIAGNOSIS — J45.40 MODERATE PERSISTENT ASTHMA WITHOUT COMPLICATION: ICD-10-CM

## 2023-04-27 DIAGNOSIS — M25.572 LEFT ANKLE PAIN, UNSPECIFIED CHRONICITY: Primary | ICD-10-CM

## 2023-04-27 DIAGNOSIS — M54.41 CHRONIC LEFT-SIDED LOW BACK PAIN WITH RIGHT-SIDED SCIATICA: ICD-10-CM

## 2023-04-27 RX ORDER — ALBUTEROL SULFATE 90 UG/1
2 AEROSOL, METERED RESPIRATORY (INHALATION) EVERY 6 HOURS PRN
Qty: 8 G | Refills: 0 | Status: SHIPPED | OUTPATIENT
Start: 2023-04-27

## 2023-04-27 RX ORDER — GABAPENTIN 300 MG/1
300 CAPSULE ORAL
Qty: 30 CAPSULE | Refills: 0 | Status: SHIPPED | OUTPATIENT
Start: 2023-04-27

## 2023-04-27 NOTE — PROGRESS NOTES
"Daily Note     Today's date: 2023  Patient name: Massiel Rodríguez  : 1975  MRN: [id number]  Referring provider: Self, Referral  Dx:   Encounter Diagnosis     ICD-10-CM    1  Left ankle pain, unspecified chronicity  M25 572       2  Pain of left heel  M79 672       3  Chronic left-sided low back pain with right-sided sciatica  M54 41     G89 29                      Subjective: pt reports she feels things have plateau since she was here last, the inserts cont to help when in her shoes but barefoot walking does bother her  /10 discomfort on arrival in L heel  No noted increased soreness with increased WB progressions last visit  Objective: See treatment diary below      Assessment: Tolerated treatment well  Pt had mild lateral foot pain with inclined treadmill walking initially but this relieved as she cont walking  Pt able to progress SL HR resistance with no increased pain  Pt challenged with L foot control during standing [place] board progression, fatigue noted  Cont to progress LE and foot strengthening as tolerated per symptom irritability  Patient would benefit from continued PT      Plan: Continue per plan of care        Precautions:   Past Medical History:   Diagnosis Date    Achilles bursitis of left lower extremity     Anxiety     Asthma     Depression     Primary hypertension      SOC: 3/30/2023  FOTO: 3/30/2023  POC Expiration: 2023  Daily Treatment Log:  Date 2023      Visit # 6  7       Manual 5' 5'       Ktape         IASTM  W/ DF stretch 10' RR  W/ DF stretch RR               Ther Exer 10' 15'      [place] warm up   2 0 MPH 2 5% incline x5'       Ankle circles  2# 2x10 cw/ccw        Gastroc str w/ SOS 20\"x3  20\"x3       4 way ankle TBAnkle circles  GTB IN/EV 2x10 ea  GTB IN/EV 2x10 ea        plantar fascia str        Therabar plantar fascia rolling         B/L HR  2x10 no shoes  2x10               HEP        Ther Activ                                             " "           NMReed 25'   25'       Toe scrunches towel         [place] board ROM 20x cw/ccw Standing 20x cw/ccw       Arch raise        WB AP/ML  No UE support 20x ea  20x ea       Tandem stance on foam  No shoes 20\"x2 R/L        FT EC on foam  No shoes 30\"x2       SLS w/ fwd reach  1x10        FSU         LSU         SL HR on leg press  20# 2x10 L  25# 2x10 L       SL leg press   30# 2x10 L       Side stepping w/ TB around forefoot   RTB 20'x2       Monster walking w/ TB around forefoot   RTB 20'x2       Modalities                                  Access Code: [email or web address]  URL: [email or web address]  Date: 03/30/2023  Prepared by: Bassam Butler    Exercises  - Seated Plantar Fascia Stretch  - 1 x daily - 7 x weekly - 5 reps - 10 hold  - Long Sitting Calf Stretch with Strap  - 1 x daily - 7 x weekly - 5 reps - 10 hold  - Seated Ankle Circles  - 7 x weekly - 2 sets - 10 reps  - Supine Ankle Pumps  - 7 x weekly - 2 sets - 10 reps               "

## 2023-05-01 ENCOUNTER — OFFICE VISIT (OUTPATIENT)
Dept: PHYSICAL THERAPY | Facility: CLINIC | Age: 48
End: 2023-05-01

## 2023-05-01 DIAGNOSIS — M79.672 PAIN OF LEFT HEEL: ICD-10-CM

## 2023-05-01 DIAGNOSIS — M25.572 LEFT ANKLE PAIN, UNSPECIFIED CHRONICITY: Primary | ICD-10-CM

## 2023-05-01 NOTE — PROGRESS NOTES
"Daily Note     Today's date: 2023  Patient name: Doreen Villalba  : 1975  MRN: [id number]  Referring provider: Self, Referral  Dx:   Encounter Diagnosis     ICD-10-CM    1  Left ankle pain, unspecified chronicity  M25 572       2  Pain of left heel  M79 672                      Subjective: pt reports she is doing pretty good overall but notes pinpoint pain in L heel cont  Objective: See treatment diary below      Assessment: Tolerated treatment well  Challenged by strengthening program without inc in pain  Improving foot/ankle control noted t/o session  Did not perform IASTM today in order to assess patients tolerance to strictly exercised based session due to minimal pain upon completion of session  Patient would benefit from continued PT      Plan: Continue per plan of care        Precautions:   Past Medical History:   Diagnosis Date    Achilles bursitis of left lower extremity     Anxiety     Asthma     Depression     Primary hypertension      SOC: 3/30/2023  FOTO: 2023  POC Expiration: 2023  Daily Treatment Log:  Date 2023     Visit # 6  7  8     Manual 5' 5'       IASTM  W/ DF stretch 10' RR  W/ DF stretch RR               Ther Exer 10' 15' 15'     [place] warm up   2 0 MPH 2 5% incline x5'  2 5 MPH 2 5% inc x 5'      Ankle circles  2# 2x10 cw/ccw        Gastroc str w/ SOS 20\"x3  20\"x3  20\"x3     4 way ankle TB GTB IN/EV 2x10 ea  GTB IN/EV 2x10 ea   BTB 2x10 ea     B/L HR  2x10 no shoes  2x10  2x10              HEP        Ther Activ                                                        NMReed 25'   25'  30'     [place] board ROM 20x cw/ccw Standing 20x cw/ccw  Std 20x cw/ccw     WB AP/ML  No UE support 20x ea  20x ea  Std 20xea     Tandem stance on foam  No shoes 20\"x2 R/L        FT EC on foam  No shoes 30\"x2       SLS w/ fwd reach  1x10   2x10 L      FSU         LSU         SL HR on leg press  20# 2x10 L  25# 2x10 L  25# 2x10 L     SL leg press   30# 2x10 L  " 40# 2x10 L     Side stepping w/ TB around forefoot   RTB 20'x2  RTB 20'x2     Monster walking w/ TB around forefoot   RTB 20'x2  RTB 20'x2     Modalities                                  Access Code: [email or web address]  URL: [email or web address]  Date: 03/30/2023  Prepared by: Rafael Triplett    Exercises  - Seated Plantar Fascia Stretch  - 1 x daily - 7 x weekly - 5 reps - 10 hold  - Long Sitting Calf Stretch with Strap  - 1 x daily - 7 x weekly - 5 reps - 10 hold  - Seated Ankle Circles  - 7 x weekly - 2 sets - 10 reps  - Supine Ankle Pumps  - 7 x weekly - 2 sets - 10 reps

## 2023-05-04 ENCOUNTER — OFFICE VISIT (OUTPATIENT)
Dept: PHYSICAL THERAPY | Facility: CLINIC | Age: 48
End: 2023-05-04

## 2023-05-04 DIAGNOSIS — M54.41 CHRONIC LEFT-SIDED LOW BACK PAIN WITH RIGHT-SIDED SCIATICA: ICD-10-CM

## 2023-05-04 DIAGNOSIS — G89.29 CHRONIC LEFT-SIDED LOW BACK PAIN WITH RIGHT-SIDED SCIATICA: ICD-10-CM

## 2023-05-04 DIAGNOSIS — M25.572 LEFT ANKLE PAIN, UNSPECIFIED CHRONICITY: Primary | ICD-10-CM

## 2023-05-04 DIAGNOSIS — M79.672 PAIN OF LEFT HEEL: ICD-10-CM

## 2023-05-04 NOTE — PROGRESS NOTES
"Daily Note     Today's date: 2023  Patient name: Odalys Das  : 1975  MRN: [id number]  Referring provider: Self, Referral  Dx:   Encounter Diagnosis     ICD-10-CM    1  Left ankle pain, unspecified chronicity  M25 572       2  Pain of left heel  M79 672       3  Chronic left-sided low back pain with right-sided sciatica  M54 41     G89 29                      Subjective: Pt reports she was ok without IASTM last visit  She reports she has much better mobility now upon waking, but will still have heel pain if she walks/trains her dogs all day  She reports therapy, combined w/ her inserts and new sneakers is helping  Objective: See treatment diary below      Assessment: Tolerated treatment well  Patient is challengedby NMREed on foam and needs VC's for form w/ SL Leg press  Pt is progressing well  Plan: Continue per plan of care        Precautions:   Past Medical History:   Diagnosis Date    Achilles bursitis of left lower extremity     Anxiety     Asthma     Depression     Primary hypertension      SOC: 3/30/2023  FOTO: 2023  POC Expiration: 2023  Daily Treatment Log:  Date 2023    Visit # 6  7  8 9    Manual 5' 5'       IASTM  W/ DF stretch 10' RR  W/ DF stretch RR               Ther Exer 10' 15' 15' 15'    [place] warm up   2 0 MPH 2 5% incline x5'  2 5 MPH 2 5% inc x 5'  2 5 MPH 2 5% incl x5'    Ankle circles  2# 2x10 cw/ccw        Gastroc str w/ SOS 20\"x3  20\"x3  20\"x3     4 way ankle TB GTB IN/EV 2x10 ea  GTB IN/EV 2x10 ea   BTB 2x10 ea Blue 2x12 ea    B/L HR  2x10 no shoes  2x10  2x10  Off step            HEP        Ther Activ                                                        NMReed 25'   25'  30' 30'    [place] board ROM 20x cw/ccw Standing 20x cw/ccw  Std 20x cw/ccw     WB AP/ML  No UE support 20x ea  20x ea  Std 20xea Std 20x AP    Tandem stance on foam  No shoes 20\"x2 R/L    Tandem walk foam beam 10'x2    FT EC on foam  No shoes 30\"x2   " 30'x2    Taoism pew FT EO on foam    1x10    SLS w/ fwd reach  1x10   2x10 L  2x10     [place] board squats    1x10 AP    LSU         SL HR on leg press  20# 2x10 L  25# 2x10 L  25# 2x10 L 35# 2x10 R/L    SL leg press   30# 2x10 L  40# 2x10 L 40# 2X10 R/L    Side stepping w/ TB around forefoot   RTB 20'x2  RTB 20'x2     Monster walking w/ TB around forefoot   RTB 20'x2  RTB 20'x2     Modalities                                  Access Code: [email or web address]  URL: [email or web address]  Date: 03/30/2023  Prepared by: Mini North    Exercises  - Seated Plantar Fascia Stretch  - 1 x daily - 7 x weekly - 5 reps - 10 hold  - Long Sitting Calf Stretch with Strap  - 1 x daily - 7 x weekly - 5 reps - 10 hold  - Seated Ankle Circles  - 7 x weekly - 2 sets - 10 reps  - Supine Ankle Pumps  - 7 x weekly - 2 sets - 10 reps

## 2023-05-05 DIAGNOSIS — M54.50 CHRONIC BILATERAL LOW BACK PAIN WITHOUT SCIATICA: ICD-10-CM

## 2023-05-05 DIAGNOSIS — G89.29 CHRONIC BILATERAL LOW BACK PAIN WITHOUT SCIATICA: ICD-10-CM

## 2023-05-08 RX ORDER — BACLOFEN 10 MG/1
10 TABLET ORAL 3 TIMES DAILY
Qty: 90 TABLET | Refills: 0 | Status: SHIPPED | OUTPATIENT
Start: 2023-05-08

## 2023-05-10 ENCOUNTER — OFFICE VISIT (OUTPATIENT)
Dept: PHYSICAL THERAPY | Facility: CLINIC | Age: 48
End: 2023-05-10

## 2023-05-10 DIAGNOSIS — M25.572 LEFT ANKLE PAIN, UNSPECIFIED CHRONICITY: Primary | ICD-10-CM

## 2023-05-10 DIAGNOSIS — M79.672 PAIN OF LEFT HEEL: ICD-10-CM

## 2023-05-10 NOTE — PROGRESS NOTES
"Daily Note     Today's date: 5/10/2023  Patient name: Jessica Douglass  : 1975  MRN: [id number]  Referring provider: Self, Referral  Dx:   Encounter Diagnosis     ICD-10-CM    1  Left ankle pain, unspecified chronicity  M25 572       2  Pain of left heel  M79 672                      Subjective: pt reports that she worked with her dog over 4 days and had over 66k steps during that time  She did have some soreness and limped a little but this amount of activity would not have been possible before PT due to pain  Objective: See treatment diary below      Assessment: Tolerated treatment well  Pt remains challenged with tandem amb on BB dem moderate ankle instability noted during, intermittent use of hands/stepping strategies to prevent LOB  Cont to progress ankle strengthening as tolerated  Patient would benefit from continued PT      Plan: Continue per plan of care        Precautions:   Past Medical History:   Diagnosis Date   • Achilles bursitis of left lower extremity    • Anxiety    • Asthma    • Depression    • Primary hypertension      SOC: 3/30/2023  FOTO: 2023  POC Expiration: 2023  Daily Treatment Log:  Date 2023 2023 5/1/23 2023 5/10/2023   Visit # 6  7  8 9 10    Manual 5' 5'       IASTM  W/ DF stretch 10' RR  W/ DF stretch RR               Ther Exer 10' 15' 15' 15' 15'   [place] warm up   2 0 MPH 2 5% incline x5'  2 5 MPH 2 5% inc x 5'  2 5 MPH 2 5% incl x5' 2 7 MPH 3% incl x5'    Ankle circles  2# 2x10 cw/ccw        Gastroc str w/ SOS 20\"x3  20\"x3  20\"x3     4 way ankle TB GTB IN/EV 2x10 ea  GTB IN/EV 2x10 ea   BTB 2x10 ea Blue 2x12 ea    B/L HR  2x10 no shoes  2x10  2x10  Off step Off step 2x10    Lateral heel taps      6\" step 2x10 L    HEP        Ther Activ                                                        NMReed 25'   25'  30' 30' 25'    [place] board ROM 20x cw/ccw Standing 20x cw/ccw  Std 20x cw/ccw  Std 2x10 cw/ccw    WB AP/ML  No UE support 20x ea  20x ea  Std " "20xea Std 20x AP    Tandem stance on foam  No shoes 20\"x2 R/L    Tandem walk foam beam 10'x2 Tandem walk foam beam 4 laps fwd    FT EC on foam  No shoes 30\"x2   30'x2 30\"x2    Rastafarian pew FT EO on foam    1x10    SLS w/ fwd reach  1x10   2x10 L  2x10  2x10 L    Rocker board squats    1x10 AP AP 2x10    LSU         SL HR on leg press  20# 2x10 L  25# 2x10 L  25# 2x10 L 35# 2x10 R/L 35# 2x15 R/L    SL leg press   30# 2x10 L  40# 2x10 L 40# 2X10 R/L    Side stepping w/ TB around forefoot   RTB 20'x2  RTB 20'x2     Monster walking w/ TB around forefoot   RTB 20'x2  RTB 20'x2     Modalities                                  Access Code: [email or web address]  URL: [email or web address]  Date: 03/30/2023  Prepared by: Apple Cabrera    Exercises  - Seated Plantar Fascia Stretch  - 1 x daily - 7 x weekly - 5 reps - 10 hold  - Long Sitting Calf Stretch with Strap  - 1 x daily - 7 x weekly - 5 reps - 10 hold  - Seated Ankle Circles  - 7 x weekly - 2 sets - 10 reps  - Supine Ankle Pumps  - 7 x weekly - 2 sets - 10 reps                   "

## 2023-05-11 ENCOUNTER — APPOINTMENT (OUTPATIENT)
Dept: PHYSICAL THERAPY | Facility: CLINIC | Age: 48
End: 2023-05-11
Payer: COMMERCIAL

## 2023-05-11 ENCOUNTER — EVALUATION (OUTPATIENT)
Dept: PHYSICAL THERAPY | Facility: CLINIC | Age: 48
End: 2023-05-11

## 2023-05-11 DIAGNOSIS — M25.572 LEFT ANKLE PAIN, UNSPECIFIED CHRONICITY: Primary | ICD-10-CM

## 2023-05-11 DIAGNOSIS — M79.672 PAIN OF LEFT HEEL: ICD-10-CM
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
## 2023-05-18 DIAGNOSIS — I10 PRIMARY HYPERTENSION: ICD-10-CM

## 2023-05-18 RX ORDER — HYDROCHLOROTHIAZIDE 12.5 MG/1
TABLET ORAL
Qty: 30 TABLET | Refills: 0 | Status: SHIPPED | OUTPATIENT
Start: 2023-05-18

## 2023-05-30 DIAGNOSIS — M54.50 CHRONIC BILATERAL LOW BACK PAIN WITHOUT SCIATICA: ICD-10-CM

## 2023-05-30 DIAGNOSIS — I10 PRIMARY HYPERTENSION: ICD-10-CM

## 2023-05-30 DIAGNOSIS — G89.29 CHRONIC BILATERAL LOW BACK PAIN WITHOUT SCIATICA: ICD-10-CM

## 2023-05-30 RX ORDER — HYDROCHLOROTHIAZIDE 12.5 MG/1
12.5 TABLET ORAL DAILY
Qty: 30 TABLET | Refills: 0 | Status: SHIPPED | OUTPATIENT
Start: 2023-05-30

## 2023-05-30 RX ORDER — BACLOFEN 10 MG/1
10 TABLET ORAL 3 TIMES DAILY
Qty: 90 TABLET | Refills: 0 | Status: SHIPPED | OUTPATIENT
Start: 2023-05-30

## 2023-06-15 DIAGNOSIS — M54.41 CHRONIC LEFT-SIDED LOW BACK PAIN WITH RIGHT-SIDED SCIATICA: ICD-10-CM

## 2023-06-15 DIAGNOSIS — G89.29 CHRONIC LEFT-SIDED LOW BACK PAIN WITH RIGHT-SIDED SCIATICA: ICD-10-CM

## 2023-06-15 RX ORDER — GABAPENTIN 300 MG/1
300 CAPSULE ORAL
Qty: 30 CAPSULE | Refills: 0 | Status: SHIPPED | OUTPATIENT
Start: 2023-06-15

## 2023-07-12 DIAGNOSIS — G89.29 CHRONIC LEFT-SIDED LOW BACK PAIN WITH RIGHT-SIDED SCIATICA: ICD-10-CM

## 2023-07-12 DIAGNOSIS — I10 PRIMARY HYPERTENSION: ICD-10-CM

## 2023-07-12 DIAGNOSIS — G89.29 CHRONIC BILATERAL LOW BACK PAIN WITHOUT SCIATICA: ICD-10-CM

## 2023-07-12 DIAGNOSIS — M54.50 CHRONIC BILATERAL LOW BACK PAIN WITHOUT SCIATICA: ICD-10-CM

## 2023-07-12 DIAGNOSIS — M54.41 CHRONIC LEFT-SIDED LOW BACK PAIN WITH RIGHT-SIDED SCIATICA: ICD-10-CM

## 2023-07-12 RX ORDER — AMLODIPINE BESYLATE 5 MG/1
5 TABLET ORAL DAILY
Qty: 90 TABLET | Refills: 0 | Status: SHIPPED | OUTPATIENT
Start: 2023-07-12 | End: 2023-07-17

## 2023-07-12 RX ORDER — GABAPENTIN 300 MG/1
300 CAPSULE ORAL
Qty: 30 CAPSULE | Refills: 0 | Status: SHIPPED | OUTPATIENT
Start: 2023-07-12

## 2023-07-12 RX ORDER — BACLOFEN 10 MG/1
10 TABLET ORAL 3 TIMES DAILY
Qty: 90 TABLET | Refills: 0 | Status: SHIPPED | OUTPATIENT
Start: 2023-07-12 | End: 2023-07-13 | Stop reason: SDUPTHER

## 2023-07-12 RX ORDER — HYDROCHLOROTHIAZIDE 12.5 MG/1
12.5 TABLET ORAL DAILY
Qty: 90 TABLET | Refills: 0 | Status: SHIPPED | OUTPATIENT
Start: 2023-07-12 | End: 2023-07-13 | Stop reason: SDUPTHER

## 2023-07-13 DIAGNOSIS — I10 PRIMARY HYPERTENSION: ICD-10-CM

## 2023-07-13 DIAGNOSIS — G89.29 CHRONIC BILATERAL LOW BACK PAIN WITHOUT SCIATICA: ICD-10-CM

## 2023-07-13 DIAGNOSIS — M54.50 CHRONIC BILATERAL LOW BACK PAIN WITHOUT SCIATICA: ICD-10-CM

## 2023-07-13 RX ORDER — BACLOFEN 10 MG/1
10 TABLET ORAL 3 TIMES DAILY
Qty: 90 TABLET | Refills: 0 | Status: SHIPPED | OUTPATIENT
Start: 2023-07-13

## 2023-07-13 RX ORDER — HYDROCHLOROTHIAZIDE 12.5 MG/1
12.5 TABLET ORAL DAILY
Qty: 90 TABLET | Refills: 0 | Status: SHIPPED | OUTPATIENT
Start: 2023-07-13

## 2023-07-17 DIAGNOSIS — I10 PRIMARY HYPERTENSION: ICD-10-CM

## 2023-07-17 RX ORDER — AMLODIPINE BESYLATE 5 MG/1
TABLET ORAL
Qty: 90 TABLET | Refills: 0 | Status: SHIPPED | OUTPATIENT
Start: 2023-07-17

## 2023-08-02 ENCOUNTER — TELEPHONE (OUTPATIENT)
Dept: OBGYN CLINIC | Facility: CLINIC | Age: 48
End: 2023-08-02

## 2023-08-02 DIAGNOSIS — R93.89 ABNORMAL PELVIC ULTRASOUND: Primary | ICD-10-CM

## 2023-08-02 NOTE — TELEPHONE ENCOUNTER
Received a letter from [address] [place] [address] radiology that patient is due for her repeat 1 year pelvic ultrasound. [employer] message sent to patient (okay per communication form). Order placed.

## 2023-08-08 ENCOUNTER — TELEPHONE (OUTPATIENT)
Dept: FAMILY MEDICINE CLINIC | Facility: CLINIC | Age: 48
End: 2023-08-08

## 2023-08-08 NOTE — TELEPHONE ENCOUNTER
Per Dr. Onesimo Mckeon please call Patient to schedule an office visit for an eval on Saturday with Dr. Edd Palma.

## 2023-08-08 NOTE — TELEPHONE ENCOUNTER
----- Message from [address]an sent at 8/7/2023  4:54 PM EDT -----  Regarding: Sprained ankle, maybe more  Contact: [phone]  I probably can't get in before Saturday morning because of drive time (I am in [place]). Do you think that will be ok?     Radhika Knapp

## 2023-08-11 ENCOUNTER — OFFICE VISIT (OUTPATIENT)
Dept: FAMILY MEDICINE CLINIC | Facility: CLINIC | Age: 48
End: 2023-08-11
Payer: COMMERCIAL

## 2023-08-11 ENCOUNTER — APPOINTMENT (OUTPATIENT)
Dept: RADIOLOGY | Facility: CLINIC | Age: 48
End: 2023-08-11
Payer: COMMERCIAL

## 2023-08-11 VITALS
DIASTOLIC BLOOD PRESSURE: 90 MMHG | BODY MASS INDEX: 26.83 KG/M2 | SYSTOLIC BLOOD PRESSURE: 136 MMHG | HEART RATE: 62 BPM | WEIGHT: 177 LBS | HEIGHT: 68 IN | TEMPERATURE: 98.2 F | RESPIRATION RATE: 16 BRPM

## 2023-08-11 DIAGNOSIS — S93.401A SPRAIN OF RIGHT ANKLE, UNSPECIFIED LIGAMENT, INITIAL ENCOUNTER: Primary | ICD-10-CM

## 2023-08-11 DIAGNOSIS — S93.401A SPRAIN OF RIGHT ANKLE, UNSPECIFIED LIGAMENT, INITIAL ENCOUNTER: ICD-10-CM

## 2023-08-11 DIAGNOSIS — Z12.11 SCREENING FOR COLON CANCER: ICD-10-CM

## 2023-08-11 DIAGNOSIS — H61.91 EARLOBE LESION, RIGHT: ICD-10-CM

## 2023-08-11 DIAGNOSIS — M79.89 FOOT SWELLING: ICD-10-CM

## 2023-08-11 PROCEDURE — 73610 X-RAY EXAM OF ANKLE: CPT

## 2023-08-11 PROCEDURE — 73630 X-RAY EXAM OF FOOT: CPT

## 2023-08-11 PROCEDURE — 99214 OFFICE O/P EST MOD 30 MIN: CPT | Performed by: FAMILY MEDICINE

## 2023-08-11 NOTE — PROGRESS NOTES
Chief Complaint   Patient presents with   • Ankle Injury     Patient complains of sprained right ankle         Patient ID: Fercho Noel is a 49 y.o. female. HPI  Pt is seeing for R ankle pain at lateral aspect x 1 wk after mild injury -  Sprained her ankle while on vacation -  Using air cast ankle brace -  Able to bear weight     The following portions of the patient's history were reviewed and updated as appropriate: allergies, current medications, past family history, past medical history, past social history, past surgical history and problem list.    Review of Systems   Skin:        Skin lesion L earlobe x 6 months -  Not healing    All other systems reviewed and are negative. Current Outpatient Medications   Medication Sig Dispense Refill   • amLODIPine (NORVASC) 5 mg tablet take 1 tablet by mouth once daily 90 tablet 0   • baclofen 10 mg tablet Take 1 tablet (10 mg total) by mouth 3 (three) times a day 90 tablet 0   • gabapentin (Neurontin) 300 mg capsule Take 1 capsule (300 mg total) by mouth daily at bedtime 30 capsule 0   • hydrochlorothiazide (HYDRODIURIL) 12.5 mg tablet Take 1 tablet (12.5 mg total) by mouth daily 90 tablet 0   • albuterol (PROVENTIL HFA,VENTOLIN HFA) 90 mcg/act inhaler Inhale 2 puffs every 6 (six) hours as needed for wheezing (Patient not taking: Reported on 8/11/2023) 8 g 0   • fluticasone (Flovent Diskus) 50 MCG/BLIST diskus inhaler Inhale 1 puff 2 (two) times a day Rinse mouth after use. 60 blister 6   • montelukast (SINGULAIR) 10 mg tablet Take 1 tablet (10 mg total) by mouth daily at bedtime (Patient not taking: Reported on 4/26/2023) 90 tablet 1     No current facility-administered medications for this visit.        Objective:    /90 (BP Location: Left arm, Patient Position: Sitting, Cuff Size: Standard)   Pulse 62   Temp 98.2 °F (36.8 °C)   Resp 16   Ht 5' 8" (1.727 m)   Wt 80.3 kg (177 lb)   BMI 26.91 kg/m²        Physical Exam  Constitutional: Appearance: She is not ill-appearing. Musculoskeletal:      Right ankle: Swelling and ecchymosis present. Tenderness present over the proximal fibula. Decreased range of motion. Right foot: Normal range of motion. Swelling (minimal with bruises at the distal foot ) present. No deformity or bony tenderness. Skin:     Findings: Lesion (mid L earlobe 4 mm ) present. Neurological:      Mental Status: She is alert. Assessment/Plan:         Diagnoses and all orders for this visit:    Sprain of right ankle, unspecified ligament, initial encounter  -     XR ankle 3+ vw right; Future  -     XR foot 3+ vw right; Future    Screening for colon cancer  -     Ambulatory Referral to Gastroenterology; Future    Foot swelling    Earlobe lesion, right  -     Ambulatory Referral to Dermatology;  Future              rto prn             Mely Santos MD

## 2023-08-15 ENCOUNTER — TELEPHONE (OUTPATIENT)
Age: 48
End: 2023-08-15

## 2023-08-23 ENCOUNTER — OFFICE VISIT (OUTPATIENT)
Dept: GASTROENTEROLOGY | Facility: CLINIC | Age: 48
End: 2023-08-23
Payer: COMMERCIAL

## 2023-08-23 ENCOUNTER — TELEPHONE (OUTPATIENT)
Dept: GASTROENTEROLOGY | Facility: CLINIC | Age: 48
End: 2023-08-23

## 2023-08-23 VITALS
DIASTOLIC BLOOD PRESSURE: 81 MMHG | WEIGHT: 178.6 LBS | HEART RATE: 65 BPM | HEIGHT: 68 IN | SYSTOLIC BLOOD PRESSURE: 129 MMHG | BODY MASS INDEX: 27.07 KG/M2

## 2023-08-23 DIAGNOSIS — R19.7 DIARRHEA, UNSPECIFIED TYPE: ICD-10-CM

## 2023-08-23 DIAGNOSIS — Z12.11 SCREENING FOR COLON CANCER: Primary | ICD-10-CM

## 2023-08-23 PROCEDURE — 99244 OFF/OP CNSLTJ NEW/EST MOD 40: CPT | Performed by: INTERNAL MEDICINE

## 2023-08-23 RX ORDER — SODIUM, POTASSIUM,MAG SULFATES 17.5-3.13G
177 SOLUTION, RECONSTITUTED, ORAL ORAL ONCE
Qty: 177 ML | Refills: 0 | Status: SHIPPED | OUTPATIENT
Start: 2023-08-23 | End: 2023-08-23

## 2023-08-23 NOTE — TELEPHONE ENCOUNTER
Please advise if patient is cleared for the following procedure.     Our mutual patient is scheduled for procedure: colonoscopy    On: September 13 , 2023     With: Dr. Radha Win MD      Physician Approving clearance: Dr. Jules Muñoz

## 2023-08-23 NOTE — PROGRESS NOTES
[facility] - Outpatient Consultation  Brittny Brown 49 y.o. female MRN: [id number]  Encounter: [id number]      PCP: Maycol Reyna MD  Referring: Maycol Reyna MD  [address] [place] [address]      ASSESSMENT AND PLAN:      1. Diarrhea, unspecified type  Loose consistency of stools that occur 1 time per day, or not associated with any warning signs or symptoms, but she describes [place] stool scale 5/6  Recommend psyllium fiber to improve stool consistency  Rule out infection or malabsorptive causes as below  - Ambulatory Referral to Gastroenterology  - CBC  - Comprehensive metabolic panel  - Celiac Disease Antibody Profile  - Giardia antigen  - Ova and parasite examination    2. Screening for colon cancer  Average risk, index screening colonoscopy  - Colonoscopy; Future  - Na Sulfate-K Sulfate-Mg Sulf (Suprep Bowel Prep Kit) 17.5-3.13-1.6 GM/177ML SOLN; Take 177 mL by mouth once for 1 dose Take 177 mL by mouth once for 1 dose  Dispense: 177 mL; Refill: 0      ______________________________________________________________________    CC:  Chief Complaint   Patient presents with   • Consult       HPI:     Patient is a 68-year-old female who is referred for diarrhea. She has asthma, ASHLEY, HTN, low back pain, anxiety/depression. She is referred for screening colonoscopy, never previously had a screening colonoscopy. She denies any family history of colon cancer. She does have bowel movements once a day in the morning, she describes intermittently loose consistency of stool. She describes [place] stool scale 5-6. She denies any abdominal pain, or associated rectal bleeding. She has no unintentional weight loss. She does go camping frequently, and is concerned about exposure to stool parasites. She denies any fecal urgency or fecal incontinence. She denies any upper GI symptoms.     REVIEW OF SYSTEMS:    CONSTITUTIONAL: Denies any fever, chills, rigors, and weight loss. HEENT: No earache or tinnitus. Denies hearing loss or visual disturbances. CARDIOVASCULAR: No chest pain or palpitations. RESPIRATORY: Denies any cough, hemoptysis, shortness of breath or dyspnea on exertion. GASTROINTESTINAL: As noted in the History of Present Illness. GENITOURINARY: No problems with urination. Denies any hematuria or dysuria. NEUROLOGIC: No dizziness or vertigo, denies headaches. MUSCULOSKELETAL: Denies any muscle or joint pain. SKIN: Denies skin rashes or itching. ENDOCRINE: Denies excessive thirst. Denies intolerance to heat or cold. PSYCHOSOCIAL: Denies depression or anxiety. Denies any recent memory loss.        Historical Information   Past Medical History:   Diagnosis Date   • Achilles bursitis of left lower extremity    • Anxiety    • Asthma    • Depression    • Primary hypertension      Past Surgical History:   Procedure Laterality Date   • COLONOSCOPY     • TONSILLECTOMY       Social History   Social History     Substance and Sexual Activity   Alcohol Use Yes    Comment: social      Social History     Substance and Sexual Activity   Drug Use No     Social History     Tobacco Use   Smoking Status Never   Smokeless Tobacco Never     Family History   Problem Relation Age of Onset   • Ulcerative colitis Mother    • Hypertension Mother    • Lung cancer Mother    • Hypertension Father    • Lung cancer Father    • Lung cancer Family    • No Known Problems Sister    • No Known Problems Maternal Uncle    • No Known Problems Paternal Aunt    • No Known Problems Paternal Uncle    • No Known Problems Maternal Grandfather    • No Known Problems Paternal Grandmother    • No Known Problems Paternal Grandfather    • Breast cancer Other    • ADD / ADHD Neg Hx    • Anesthesia problems Neg Hx    • Cancer Neg Hx    • Clotting disorder Neg Hx    • Collagen disease Neg Hx    • Diabetes Neg Hx    • Dislocations Neg Hx    • Learning disabilities Neg Hx    • Neurological problems Neg Hx • Osteoporosis Neg Hx    • Rheumatologic disease Neg Hx    • Scoliosis Neg Hx    • Vascular Disease Neg Hx        Meds/Allergies       Current Outpatient Medications:   •  albuterol (PROVENTIL HFA,VENTOLIN HFA) 90 mcg/act inhaler  •  amLODIPine (NORVASC) 5 mg tablet  •  baclofen 10 mg tablet  •  gabapentin (Neurontin) 300 mg capsule  •  hydrochlorothiazide (HYDRODIURIL) 12.5 mg tablet  •  fluticasone (Flovent Diskus) 50 MCG/BLIST diskus inhaler  •  montelukast (SINGULAIR) 10 mg tablet    Allergies   Allergen Reactions   • Lisinopril Itching   • Cephalexin Rash   • Penicillins Rash           Objective     Blood pressure 129/81, pulse 65, height 5' 8" (1.727 m), weight 81 kg (178 lb 9.6 oz). Body mass index is 27.16 kg/m². PHYSICAL EXAM:      General Appearance:   Alert, cooperative, no distress   HEENT:   Normocephalic, atraumatic, anicteric. Neck:  Supple, symmetrical, trachea midline   Lungs:   Clear to auscultation bilaterally; no rales, rhonchi or wheezing; respirations unlabored    Heart[de-identified]   Regular rate and rhythm; no murmur, rub, or gallop.    Abdomen:   Soft, non-tender, non-distended; normal bowel sounds; no masses, no organomegaly    Genitalia:   Deferred    Rectal:   Deferred    Extremities:  No cyanosis, clubbing or edema    Pulses:  2+ and symmetric    Skin:  No jaundice, rashes, or lesions    Lymph nodes:  No palpable cervical lymphadenopathy        Lab Results:     Lab Results   Component Value Date    WBC 10.23 (H) 11/28/2021    HGB 13.2 11/28/2021    HCT 41.1 11/28/2021    MCV 94 11/28/2021     11/28/2021       Lab Results   Component Value Date    K 4.4 02/04/2023     02/04/2023    CO2 30 02/04/2023    BUN 20 02/04/2023    CREATININE 0.82 02/04/2023    GLUF 103 (H) 02/04/2023    CALCIUM 8.3 02/04/2023    AST 15 10/26/2022    ALT 24 10/26/2022    ALKPHOS 51 10/26/2022    EGFR 84 02/04/2023       No results found for: "INR", "PROTIME"      Radiology Results:       Portions of the record may have been created with voice recognition software. Occasional wrong word or "sound a like" substitutions may have occurred due to the inherent limitations of voice recognition software. Read the chart carefully and recognize, using context, where substitutions have occurred.

## 2023-08-23 NOTE — H&P (VIEW-ONLY)
[facility] - Outpatient Consultation  Florence Arauz 49 y.o. female MRN: [id number]  Encounter: [id number]      PCP: Claire Grande MD  Referring: Claire Grande MD  [address] [place] [address]      ASSESSMENT AND PLAN:      1. Diarrhea, unspecified type  Loose consistency of stools that occur 1 time per day, or not associated with any warning signs or symptoms, but she describes [place] stool scale 5/6  Recommend psyllium fiber to improve stool consistency  Rule out infection or malabsorptive causes as below  - Ambulatory Referral to Gastroenterology  - CBC  - Comprehensive metabolic panel  - Celiac Disease Antibody Profile  - Giardia antigen  - Ova and parasite examination    2. Screening for colon cancer  Average risk, index screening colonoscopy  - Colonoscopy; Future  - Na Sulfate-K Sulfate-Mg Sulf (Suprep Bowel Prep Kit) 17.5-3.13-1.6 GM/177ML SOLN; Take 177 mL by mouth once for 1 dose Take 177 mL by mouth once for 1 dose  Dispense: 177 mL; Refill: 0      ______________________________________________________________________    CC:  Chief Complaint   Patient presents with   • Consult       HPI:     Patient is a 54-year-old female who is referred for diarrhea. She has asthma, ASHLEY, HTN, low back pain, anxiety/depression. She is referred for screening colonoscopy, never previously had a screening colonoscopy. She denies any family history of colon cancer. She does have bowel movements once a day in the morning, she describes intermittently loose consistency of stool. She describes [place] stool scale 5-6. She denies any abdominal pain, or associated rectal bleeding. She has no unintentional weight loss. She does go camping frequently, and is concerned about exposure to stool parasites. She denies any fecal urgency or fecal incontinence. She denies any upper GI symptoms.     REVIEW OF SYSTEMS:    CONSTITUTIONAL: Denies any fever, chills, rigors, and weight loss. HEENT: No earache or tinnitus. Denies hearing loss or visual disturbances. CARDIOVASCULAR: No chest pain or palpitations. RESPIRATORY: Denies any cough, hemoptysis, shortness of breath or dyspnea on exertion. GASTROINTESTINAL: As noted in the History of Present Illness. GENITOURINARY: No problems with urination. Denies any hematuria or dysuria. NEUROLOGIC: No dizziness or vertigo, denies headaches. MUSCULOSKELETAL: Denies any muscle or joint pain. SKIN: Denies skin rashes or itching. ENDOCRINE: Denies excessive thirst. Denies intolerance to heat or cold. PSYCHOSOCIAL: Denies depression or anxiety. Denies any recent memory loss.        Historical Information   Past Medical History:   Diagnosis Date   • Achilles bursitis of left lower extremity    • Anxiety    • Asthma    • Depression    • Primary hypertension      Past Surgical History:   Procedure Laterality Date   • COLONOSCOPY     • TONSILLECTOMY       Social History   Social History     Substance and Sexual Activity   Alcohol Use Yes    Comment: social      Social History     Substance and Sexual Activity   Drug Use No     Social History     Tobacco Use   Smoking Status Never   Smokeless Tobacco Never     Family History   Problem Relation Age of Onset   • Ulcerative colitis Mother    • Hypertension Mother    • Lung cancer Mother    • Hypertension Father    • Lung cancer Father    • Lung cancer Family    • No Known Problems Sister    • No Known Problems Maternal Uncle    • No Known Problems Paternal Aunt    • No Known Problems Paternal Uncle    • No Known Problems Maternal Grandfather    • No Known Problems Paternal Grandmother    • No Known Problems Paternal Grandfather    • Breast cancer Other    • ADD / ADHD Neg Hx    • Anesthesia problems Neg Hx    • Cancer Neg Hx    • Clotting disorder Neg Hx    • Collagen disease Neg Hx    • Diabetes Neg Hx    • Dislocations Neg Hx    • Learning disabilities Neg Hx    • Neurological problems Neg Hx • Osteoporosis Neg Hx    • Rheumatologic disease Neg Hx    • Scoliosis Neg Hx    • Vascular Disease Neg Hx        Meds/Allergies       Current Outpatient Medications:   •  albuterol (PROVENTIL HFA,VENTOLIN HFA) 90 mcg/act inhaler  •  amLODIPine (NORVASC) 5 mg tablet  •  baclofen 10 mg tablet  •  gabapentin (Neurontin) 300 mg capsule  •  hydrochlorothiazide (HYDRODIURIL) 12.5 mg tablet  •  fluticasone (Flovent Diskus) 50 MCG/BLIST diskus inhaler  •  montelukast (SINGULAIR) 10 mg tablet    Allergies   Allergen Reactions   • Lisinopril Itching   • Cephalexin Rash   • Penicillins Rash           Objective     Blood pressure 129/81, pulse 65, height 5' 8" (1.727 m), weight 81 kg (178 lb 9.6 oz). Body mass index is 27.16 kg/m². PHYSICAL EXAM:      General Appearance:   Alert, cooperative, no distress   HEENT:   Normocephalic, atraumatic, anicteric. Neck:  Supple, symmetrical, trachea midline   Lungs:   Clear to auscultation bilaterally; no rales, rhonchi or wheezing; respirations unlabored    Heart[de-identified]   Regular rate and rhythm; no murmur, rub, or gallop.    Abdomen:   Soft, non-tender, non-distended; normal bowel sounds; no masses, no organomegaly    Genitalia:   Deferred    Rectal:   Deferred    Extremities:  No cyanosis, clubbing or edema    Pulses:  2+ and symmetric    Skin:  No jaundice, rashes, or lesions    Lymph nodes:  No palpable cervical lymphadenopathy        Lab Results:     Lab Results   Component Value Date    WBC 10.23 (H) 11/28/2021    HGB 13.2 11/28/2021    HCT 41.1 11/28/2021    MCV 94 11/28/2021     11/28/2021       Lab Results   Component Value Date    K 4.4 02/04/2023     02/04/2023    CO2 30 02/04/2023    BUN 20 02/04/2023    CREATININE 0.82 02/04/2023    GLUF 103 (H) 02/04/2023    CALCIUM 8.3 02/04/2023    AST 15 10/26/2022    ALT 24 10/26/2022    ALKPHOS 51 10/26/2022    EGFR 84 02/04/2023       No results found for: "INR", "PROTIME"      Radiology Results:       Portions of the record may have been created with voice recognition software. Occasional wrong word or "sound a like" substitutions may have occurred due to the inherent limitations of voice recognition software. Read the chart carefully and recognize, using context, where substitutions have occurred.

## 2023-08-23 NOTE — PATIENT INSTRUCTIONS
For your bowel habits, as we discussed during today's visit,  - I would recommend starting psyllium, fiber supplementation. This can be done with use of Konsyl, Citrucil, Metamucil or Benefiber fiber, which can be purchased over-the-counter. I would recommend starting slow with 1 tsp mixed into a large glass of water, once a day, and increasing to goal of 1 tbsp mixed into a large glass of water per day.   - this helps with both diarrhea and constipation, helping to bulk the stool, and should not cause constipation or diarrhea, but treat both  - the only side effect of this can be bloating, if this occurs, cut down on the dose, and increase your water intake or alternatively, consider switching to an alternative as listed above        Scheduled date of colonoscopy (as of today): 9/13/23  Physician performing colonoscopy: Dr. Raghavendra Kirkpatrick  Location of colonoscopy: [facility]  Bowel prep reviewed with patient: Suprep  Instructions reviewed with patient by: Fayette Riedel  Clearances: cardiac clearacne

## 2023-08-24 ENCOUNTER — OFFICE VISIT (OUTPATIENT)
Dept: FAMILY MEDICINE CLINIC | Facility: CLINIC | Age: 48
End: 2023-08-24
Payer: COMMERCIAL

## 2023-08-24 VITALS
SYSTOLIC BLOOD PRESSURE: 160 MMHG | HEIGHT: 68 IN | WEIGHT: 178 LBS | TEMPERATURE: 98.4 F | BODY MASS INDEX: 26.98 KG/M2 | HEART RATE: 82 BPM | RESPIRATION RATE: 18 BRPM | DIASTOLIC BLOOD PRESSURE: 92 MMHG

## 2023-08-24 DIAGNOSIS — R50.9 FEVER, UNSPECIFIED FEVER CAUSE: ICD-10-CM

## 2023-08-24 DIAGNOSIS — Z00.00 ANNUAL PHYSICAL EXAM: Primary | ICD-10-CM

## 2023-08-24 DIAGNOSIS — J06.9 UPPER RESPIRATORY TRACT INFECTION, UNSPECIFIED TYPE: ICD-10-CM

## 2023-08-24 PROCEDURE — 99396 PREV VISIT EST AGE 40-64: CPT | Performed by: FAMILY MEDICINE

## 2023-08-24 RX ORDER — AZITHROMYCIN 250 MG/1
TABLET, FILM COATED ORAL
Qty: 6 TABLET | Refills: 0 | Status: SHIPPED | OUTPATIENT
Start: 2023-08-24 | End: 2023-08-28

## 2023-08-24 NOTE — PROGRESS NOTES
Chief Complaint   Patient presents with   • Annual Exam   • Fatigue     Wants to be checked for lymes        Patient ID: Alexsander Melendrez is a 49 y.o. female. HPI  Pt is seeing for annual PE     The following portions of the patient's history were reviewed and updated as appropriate: allergies, current medications, past family history, past medical history, past social history, past surgical history and problem list.    Review of Systems   Constitutional: Positive for fatigue. Negative for activity change, appetite change and fever. HENT: Positive for congestion and sinus pressure. Negative for facial swelling, sore throat, trouble swallowing and voice change. Respiratory: Positive for cough. Negative for chest tightness, shortness of breath and wheezing. Cardiovascular: Negative. Gastrointestinal: Negative. Endocrine: Negative. Genitourinary: Negative. Musculoskeletal: Positive for myalgias. Negative for arthralgias, back pain, gait problem, joint swelling, neck pain and neck stiffness. Skin: Negative. Recalled tick bite 2 wks ago    Allergic/Immunologic: Negative. Neurological: Negative. Hematological: Negative. Psychiatric/Behavioral: Negative.         Current Outpatient Medications   Medication Sig Dispense Refill   • amLODIPine (NORVASC) 5 mg tablet take 1 tablet by mouth once daily 90 tablet 0   • azithromycin (ZITHROMAX) 250 mg tablet Take 2 tablets today then 1 tablet daily x 4 days 6 tablet 0   • baclofen 10 mg tablet Take 1 tablet (10 mg total) by mouth 3 (three) times a day 90 tablet 0   • gabapentin (Neurontin) 300 mg capsule Take 1 capsule (300 mg total) by mouth daily at bedtime 30 capsule 0   • hydrochlorothiazide (HYDRODIURIL) 12.5 mg tablet Take 1 tablet (12.5 mg total) by mouth daily 90 tablet 0   • albuterol (PROVENTIL HFA,VENTOLIN HFA) 90 mcg/act inhaler Inhale 2 puffs every 6 (six) hours as needed for wheezing (Patient not taking: Reported on 8/24/2023) 8 g 0   • fluticasone (Flovent Diskus) 50 MCG/BLIST diskus inhaler Inhale 1 puff 2 (two) times a day Rinse mouth after use. 60 blister 6   • montelukast (SINGULAIR) 10 mg tablet Take 1 tablet (10 mg total) by mouth daily at bedtime (Patient not taking: Reported on 4/26/2023) 90 tablet 1   • Na Sulfate-K Sulfate-Mg Sulf (Suprep Bowel Prep Kit) 17.5-3.13-1.6 GM/177ML SOLN Take 177 mL by mouth once for 1 dose Take 177 mL by mouth once for 1 dose 177 mL 0     No current facility-administered medications for this visit. Objective:    /92 (BP Location: Left arm, Patient Position: Sitting, Cuff Size: Large)   Pulse 82   Temp 98.4 °F (36.9 °C)   Resp 18   Ht 5' 8" (1.727 m)   Wt 80.7 kg (178 lb)   BMI 27.06 kg/m²        Physical Exam  Constitutional:       General: She is not in acute distress. Appearance: She is not ill-appearing. HENT:      Right Ear: Tympanic membrane and ear canal normal.      Left Ear: Tympanic membrane and ear canal normal.      Nose: Congestion present. No rhinorrhea. Right Sinus: Maxillary sinus tenderness present. No frontal sinus tenderness. Left Sinus: Maxillary sinus tenderness present. No frontal sinus tenderness. Mouth/Throat:      Pharynx: No oropharyngeal exudate or posterior oropharyngeal erythema. Cardiovascular:      Rate and Rhythm: Normal rate and regular rhythm. Heart sounds: No murmur heard. Pulmonary:      Effort: Pulmonary effort is normal. No respiratory distress. Breath sounds: No wheezing, rhonchi or rales. Abdominal:      Palpations: Abdomen is soft. Tenderness: There is no abdominal tenderness. There is no right CVA tenderness or left CVA tenderness. Musculoskeletal:         General: No swelling or tenderness. Cervical back: Normal range of motion. No rigidity or tenderness. Right lower leg: No edema. Left lower leg: No edema. Lymphadenopathy:      Cervical: No cervical adenopathy.    Skin: Coloration: Skin is not pale. Findings: No rash. Neurological:      General: No focal deficit present. Mental Status: She is alert and oriented to person, place, and time. Psychiatric:         Mood and Affect: Mood normal.         Thought Content: Thought content normal.         Judgment: Judgment normal.           Labs in chart were reviewed. Assessment/Plan:         Diagnoses and all orders for this visit:    Annual physical exam  -     CBC; Future  -     Comprehensive metabolic panel; Future  -     Lipid panel; Future  -     TSH, 3rd generation; Future  -     Hemoglobin A1C; Future  -     CBC  -     Comprehensive metabolic panel  -     Lipid panel  -     TSH, 3rd generation  -     Hemoglobin A1C    Upper respiratory tract infection, unspecified type  -     azithromycin (ZITHROMAX) 250 mg tablet; Take 2 tablets today then 1 tablet daily x 4 days    Fever, unspecified fever cause  -     Lyme Disease Serology w/Reflex; Future  -     Lyme Disease Serology w/Reflex            BMI Counseling: Body mass index is 27.06 kg/m². Discussed the patient's BMI with her. The BMI is above normal. Exercise recommendations include exercising 3-5 times per week.        rto in 1 y           Ladonna Mcgill MD

## 2023-08-29 ENCOUNTER — APPOINTMENT (OUTPATIENT)
Dept: LAB | Facility: CLINIC | Age: 48
End: 2023-08-29
Payer: COMMERCIAL

## 2023-08-29 DIAGNOSIS — Z00.00 ENCOUNTER FOR GENERAL ADULT MEDICAL EXAMINATION WITHOUT ABNORMAL FINDINGS: ICD-10-CM

## 2023-08-29 DIAGNOSIS — R50.9 FEVER, UNSPECIFIED: ICD-10-CM

## 2023-08-29 DIAGNOSIS — R19.7 DIARRHEA, UNSPECIFIED: ICD-10-CM

## 2023-08-29 LAB
ALBUMIN SERPL BCP-MCNC: 4.5 G/DL (ref 3.5–5)
ALP SERPL-CCNC: 43 U/L (ref 34–104)
ALT SERPL W P-5'-P-CCNC: 19 U/L (ref 7–52)
ANION GAP SERPL CALCULATED.3IONS-SCNC: 8 MMOL/L
AST SERPL W P-5'-P-CCNC: 21 U/L (ref 13–39)
B BURGDOR IGG+IGM SER QL IA: NEGATIVE
BILIRUB SERPL-MCNC: 0.52 MG/DL (ref 0.2–1)
BUN SERPL-MCNC: 15 MG/DL (ref 5–25)
CALCIUM SERPL-MCNC: 9.2 MG/DL (ref 8.4–10.2)
CHLORIDE SERPL-SCNC: 90 MMOL/L (ref 96–108)
CHOLEST SERPL-MCNC: 168 MG/DL
CO2 SERPL-SCNC: 30 MMOL/L (ref 21–32)
CREAT SERPL-MCNC: 0.68 MG/DL (ref 0.6–1.3)
ERYTHROCYTE [DISTWIDTH] IN BLOOD BY AUTOMATED COUNT: 12.1 % (ref 11.6–15.1)
EST. AVERAGE GLUCOSE BLD GHB EST-MCNC: 117 MG/DL
GFR SERPL CREATININE-BSD FRML MDRD: 103 ML/MIN/1.73SQ M
GLUCOSE P FAST SERPL-MCNC: 80 MG/DL (ref 65–99)
HBA1C MFR BLD: 5.7 %
HCT VFR BLD AUTO: 35.7 % (ref 34.8–46.1)
HDLC SERPL-MCNC: 54 MG/DL
HGB BLD-MCNC: 12.5 G/DL (ref 11.5–15.4)
LDLC SERPL CALC-MCNC: 101 MG/DL (ref 0–100)
MCH RBC QN AUTO: 31.3 PG (ref 26.8–34.3)
MCHC RBC AUTO-ENTMCNC: 35 G/DL (ref 31.4–37.4)
MCV RBC AUTO: 90 FL (ref 82–98)
NONHDLC SERPL-MCNC: 114 MG/DL
PLATELET # BLD AUTO: 365 THOUSANDS/UL (ref 149–390)
PMV BLD AUTO: 9.3 FL (ref 8.9–12.7)
POTASSIUM SERPL-SCNC: 3.6 MMOL/L (ref 3.5–5.3)
PROT SERPL-MCNC: 7 G/DL (ref 6.4–8.4)
RBC # BLD AUTO: 3.99 MILLION/UL (ref 3.81–5.12)
SODIUM SERPL-SCNC: 128 MMOL/L (ref 135–147)
TRIGL SERPL-MCNC: 67 MG/DL
TSH SERPL DL<=0.05 MIU/L-ACNC: 3.55 UIU/ML (ref 0.45–4.5)
WBC # BLD AUTO: 6.69 THOUSAND/UL (ref 4.31–10.16)

## 2023-08-29 PROCEDURE — 82784 ASSAY IGA/IGD/IGG/IGM EACH: CPT

## 2023-08-29 PROCEDURE — 86258 DGP ANTIBODY EACH IG CLASS: CPT

## 2023-08-29 PROCEDURE — 36415 COLL VENOUS BLD VENIPUNCTURE: CPT

## 2023-08-29 PROCEDURE — 80061 LIPID PANEL: CPT

## 2023-08-29 PROCEDURE — 86618 LYME DISEASE ANTIBODY: CPT

## 2023-08-29 PROCEDURE — 86364 TISS TRNSGLTMNASE EA IG CLAS: CPT

## 2023-08-29 PROCEDURE — 80053 COMPREHEN METABOLIC PANEL: CPT

## 2023-08-29 PROCEDURE — 84443 ASSAY THYROID STIM HORMONE: CPT

## 2023-08-29 PROCEDURE — 85027 COMPLETE CBC AUTOMATED: CPT

## 2023-08-29 PROCEDURE — 86231 EMA EACH IG CLASS: CPT

## 2023-08-29 PROCEDURE — 83036 HEMOGLOBIN GLYCOSYLATED A1C: CPT

## 2023-08-30 LAB
ENDOMYSIUM IGA SER QL: NEGATIVE
GLIADIN PEPTIDE IGA SER-ACNC: 5 UNITS (ref 0–19)
GLIADIN PEPTIDE IGG SER-ACNC: 3 UNITS (ref 0–19)
IGA SERPL-MCNC: 98 MG/DL (ref 87–352)
TTG IGA SER-ACNC: <2 U/ML (ref 0–3)
TTG IGG SER-ACNC: 7 U/ML (ref 0–5)

## 2023-08-31 DIAGNOSIS — G89.29 CHRONIC LEFT-SIDED LOW BACK PAIN WITH RIGHT-SIDED SCIATICA: ICD-10-CM

## 2023-08-31 DIAGNOSIS — M54.41 CHRONIC LEFT-SIDED LOW BACK PAIN WITH RIGHT-SIDED SCIATICA: ICD-10-CM

## 2023-08-31 DIAGNOSIS — E87.1 HYPONATREMIA: Primary | ICD-10-CM

## 2023-08-31 RX ORDER — GABAPENTIN 300 MG/1
300 CAPSULE ORAL
Qty: 30 CAPSULE | Refills: 3 | Status: SHIPPED | OUTPATIENT
Start: 2023-08-31

## 2023-09-01 DIAGNOSIS — J32.9 RECURRENT SINUS INFECTIONS: Primary | ICD-10-CM

## 2023-09-12 RX ORDER — SODIUM CHLORIDE, SODIUM LACTATE, POTASSIUM CHLORIDE, CALCIUM CHLORIDE 600; 310; 30; 20 MG/100ML; MG/100ML; MG/100ML; MG/100ML
125 INJECTION, SOLUTION INTRAVENOUS CONTINUOUS
Status: CANCELLED | OUTPATIENT
Start: 2023-09-12

## 2023-09-13 ENCOUNTER — HOSPITAL ENCOUNTER (OUTPATIENT)
Dept: GASTROENTEROLOGY | Facility: AMBULARY SURGERY CENTER | Age: 48
Setting detail: OUTPATIENT SURGERY
Discharge: HOME | End: 2023-09-13
Attending: INTERNAL MEDICINE | Admitting: INTERNAL MEDICINE
Payer: COMMERCIAL

## 2023-09-13 ENCOUNTER — ANESTHESIA (OUTPATIENT)
Dept: GASTROENTEROLOGY | Facility: AMBULARY SURGERY CENTER | Age: 48
End: 2023-09-13

## 2023-09-13 ENCOUNTER — ANESTHESIA EVENT (OUTPATIENT)
Dept: GASTROENTEROLOGY | Facility: AMBULARY SURGERY CENTER | Age: 48
End: 2023-09-13

## 2023-09-13 VITALS
OXYGEN SATURATION: 100 % | BODY MASS INDEX: 26.96 KG/M2 | DIASTOLIC BLOOD PRESSURE: 73 MMHG | TEMPERATURE: 96.9 F | RESPIRATION RATE: 18 BRPM | SYSTOLIC BLOOD PRESSURE: 127 MMHG | WEIGHT: 177.91 LBS | HEIGHT: 68 IN | HEART RATE: 66 BPM

## 2023-09-13 DIAGNOSIS — Z12.11 SCREENING FOR COLON CANCER: ICD-10-CM

## 2023-09-13 DIAGNOSIS — K29.60 EROSIVE GASTRITIS: Primary | ICD-10-CM

## 2023-09-13 DIAGNOSIS — K90.0 CELIAC DISEASE: ICD-10-CM

## 2023-09-13 PROCEDURE — 88305 TISSUE EXAM BY PATHOLOGIST: CPT | Performed by: PATHOLOGY

## 2023-09-13 RX ORDER — LIDOCAINE HYDROCHLORIDE 10 MG/ML
INJECTION, SOLUTION EPIDURAL; INFILTRATION; INTRACAUDAL; PERINEURAL AS NEEDED
Status: DISCONTINUED | OUTPATIENT
Start: 2023-09-13 | End: 2023-09-13

## 2023-09-13 RX ORDER — SODIUM CHLORIDE, SODIUM LACTATE, POTASSIUM CHLORIDE, CALCIUM CHLORIDE 600; 310; 30; 20 MG/100ML; MG/100ML; MG/100ML; MG/100ML
125 INJECTION, SOLUTION INTRAVENOUS CONTINUOUS
Status: DISCONTINUED | OUTPATIENT
Start: 2023-09-13 | End: 2023-09-17 | Stop reason: HOSPADM

## 2023-09-13 RX ORDER — ONDANSETRON 2 MG/ML
4 INJECTION INTRAMUSCULAR; INTRAVENOUS ONCE AS NEEDED
Status: CANCELLED | OUTPATIENT
Start: 2023-09-13

## 2023-09-13 RX ORDER — PROPOFOL 10 MG/ML
INJECTION, EMULSION INTRAVENOUS AS NEEDED
Status: DISCONTINUED | OUTPATIENT
Start: 2023-09-13 | End: 2023-09-13

## 2023-09-13 RX ORDER — PROPOFOL 10 MG/ML
INJECTION, EMULSION INTRAVENOUS CONTINUOUS PRN
Status: DISCONTINUED | OUTPATIENT
Start: 2023-09-13 | End: 2023-09-13

## 2023-09-13 RX ORDER — OMEPRAZOLE 40 MG/1
40 CAPSULE, DELAYED RELEASE ORAL DAILY
Qty: 90 CAPSULE | Refills: 3 | Status: SHIPPED | OUTPATIENT
Start: 2023-09-13

## 2023-09-13 RX ADMIN — PROPOFOL 120 MCG/KG/MIN: 10 INJECTION, EMULSION INTRAVENOUS at 11:53

## 2023-09-13 RX ADMIN — LIDOCAINE HYDROCHLORIDE 5 ML: 10 INJECTION, SOLUTION EPIDURAL; INFILTRATION; INTRACAUDAL; PERINEURAL at 11:53

## 2023-09-13 RX ADMIN — PROPOFOL 30 MG: 10 INJECTION, EMULSION INTRAVENOUS at 11:58

## 2023-09-13 RX ADMIN — PROPOFOL 120 MG: 10 INJECTION, EMULSION INTRAVENOUS at 11:53

## 2023-09-13 RX ADMIN — SODIUM CHLORIDE, SODIUM LACTATE, POTASSIUM CHLORIDE, AND CALCIUM CHLORIDE: .6; .31; .03; .02 INJECTION, SOLUTION INTRAVENOUS at 11:35

## 2023-09-13 NOTE — ANESTHESIA POSTPROCEDURE EVALUATION
Post-Op Assessment Note    CV Status:  Stable       Mental Status:  Sleepy   Hydration Status:  Stable   PONV Controlled:  Controlled   Airway Patency:  Patent      Post Op Vitals Reviewed: Yes      Staff: CRNA         No notable events documented.     BP   103/59   Temp     Pulse  69   Resp   20   SpO2   99
calm

## 2023-09-13 NOTE — INTERVAL H&P NOTE
H&P reviewed. After examining the patient I find no changes in the patients condition since the H&P had been written.     Vitals:    09/13/23 1130   BP: 151/74   Pulse: 61   Resp: 18   Temp: (!) 96.9 °F (36.1 °C)   SpO2: 100%

## 2023-09-13 NOTE — ANESTHESIA PREPROCEDURE EVALUATION
Procedure:  COLONOSCOPY    Relevant Problems   ANESTHESIA (within normal limits)      CARDIO   (+) Primary hypertension      MUSCULOSKELETAL   (+) Chronic left-sided low back pain with right-sided sciatica      NEURO/PSYCH   (+) Chronic left-sided low back pain with right-sided sciatica      PULMONARY   (+) Asthma   (+) ASHLEY (obstructive sleep apnea)        Physical Exam    Airway    Mallampati score: II  TM Distance: >3 FB  Neck ROM: full     Dental   No notable dental hx     Cardiovascular  Rhythm: regular, Rate: normal,     Pulmonary  Breath sounds clear to auscultation,     Other Findings        Anesthesia Plan  ASA Score- 2     Anesthesia Type- IV sedation with anesthesia with ASA Monitors. Additional Monitors:   Airway Plan:           Plan Factors-Exercise tolerance (METS): >4 METS. Chart reviewed. EKG reviewed. Existing labs reviewed. Patient summary reviewed. Patient is not a current smoker. Induction-     Postoperative Plan-     Informed Consent- Anesthetic plan and risks discussed with patient. I personally reviewed this patient with the CRNA. Discussed and agreed on the Anesthesia Plan with the CRNA. Joseph Cummins

## 2023-09-18 ENCOUNTER — HOSPITAL ENCOUNTER (OUTPATIENT)
Dept: RADIOLOGY | Facility: HOSPITAL | Age: 48
Discharge: HOME | End: 2023-09-18
Payer: COMMERCIAL

## 2023-09-18 DIAGNOSIS — R93.89 ABNORMAL PELVIC ULTRASOUND: ICD-10-CM

## 2023-09-18 PROCEDURE — 76830 TRANSVAGINAL US NON-OB: CPT

## 2023-09-18 PROCEDURE — 76856 US EXAM PELVIC COMPLETE: CPT

## 2023-09-21 DIAGNOSIS — M54.50 CHRONIC BILATERAL LOW BACK PAIN WITHOUT SCIATICA: ICD-10-CM

## 2023-09-21 DIAGNOSIS — G89.29 CHRONIC BILATERAL LOW BACK PAIN WITHOUT SCIATICA: ICD-10-CM

## 2023-09-21 PROCEDURE — 88305 TISSUE EXAM BY PATHOLOGIST: CPT | Performed by: PATHOLOGY

## 2023-09-22 RX ORDER — BACLOFEN 10 MG/1
10 TABLET ORAL 3 TIMES DAILY
Qty: 90 TABLET | Refills: 0 | Status: SHIPPED | OUTPATIENT
Start: 2023-09-22

## 2023-10-13 DIAGNOSIS — I10 PRIMARY HYPERTENSION: ICD-10-CM

## 2023-10-13 DIAGNOSIS — M54.50 CHRONIC BILATERAL LOW BACK PAIN WITHOUT SCIATICA: ICD-10-CM

## 2023-10-13 DIAGNOSIS — G89.29 CHRONIC BILATERAL LOW BACK PAIN WITHOUT SCIATICA: ICD-10-CM

## 2023-10-13 RX ORDER — AMLODIPINE BESYLATE 5 MG/1
5 TABLET ORAL DAILY
Qty: 90 TABLET | Refills: 1 | Status: SHIPPED | OUTPATIENT
Start: 2023-10-13

## 2023-10-15 DIAGNOSIS — M54.50 CHRONIC BILATERAL LOW BACK PAIN WITHOUT SCIATICA: ICD-10-CM

## 2023-10-15 DIAGNOSIS — G89.29 CHRONIC BILATERAL LOW BACK PAIN WITHOUT SCIATICA: ICD-10-CM

## 2023-10-16 RX ORDER — BACLOFEN 10 MG/1
10 TABLET ORAL 3 TIMES DAILY
Qty: 90 TABLET | Refills: 0 | Status: SHIPPED | OUTPATIENT
Start: 2023-10-16

## 2023-12-06 ENCOUNTER — HOSPITAL ENCOUNTER (OUTPATIENT)
Dept: RADIOLOGY | Facility: HOSPITAL | Age: 48
Discharge: HOME | End: 2023-12-06
Attending: FAMILY MEDICINE
Payer: COMMERCIAL

## 2023-12-06 ENCOUNTER — OFFICE VISIT (OUTPATIENT)
Dept: CARDIOLOGY CLINIC | Facility: CLINIC | Age: 48
End: 2023-12-06
Payer: COMMERCIAL

## 2023-12-06 VITALS — BODY MASS INDEX: 26.52 KG/M2 | WEIGHT: 175 LBS | HEIGHT: 68 IN

## 2023-12-06 VITALS
OXYGEN SATURATION: 98 % | HEART RATE: 60 BPM | SYSTOLIC BLOOD PRESSURE: 126 MMHG | DIASTOLIC BLOOD PRESSURE: 76 MMHG | BODY MASS INDEX: 26.52 KG/M2 | HEIGHT: 68 IN | WEIGHT: 175 LBS

## 2023-12-06 DIAGNOSIS — Z12.31 ENCOUNTER FOR SCREENING MAMMOGRAM FOR MALIGNANT NEOPLASM OF BREAST: ICD-10-CM

## 2023-12-06 DIAGNOSIS — G47.33 OSA (OBSTRUCTIVE SLEEP APNEA): ICD-10-CM

## 2023-12-06 DIAGNOSIS — I10 PRIMARY HYPERTENSION: Primary | ICD-10-CM

## 2023-12-06 PROCEDURE — 77067 SCR MAMMO BI INCL CAD: CPT

## 2023-12-06 PROCEDURE — 99213 OFFICE O/P EST LOW 20 MIN: CPT | Performed by: NURSE PRACTITIONER

## 2023-12-06 PROCEDURE — 93000 ELECTROCARDIOGRAM COMPLETE: CPT | Performed by: NURSE PRACTITIONER

## 2023-12-06 PROCEDURE — 77063 BREAST TOMOSYNTHESIS BI: CPT

## 2023-12-06 NOTE — PROGRESS NOTES
Progress Note - Cardiology Office  [facility]    Brian Ramos 51 y.o. female MRN: [id number]  : 1975  Encounter: [id number]      Assessment:     1. Primary hypertension    2. ASHLEY (obstructive sleep apnea)        Discussion Summary and Plan:  1. Hypertension: Patient's blood pressure today is acceptable    -   She takes blood pressure at home and states that at times systolic can be in the 630N    -   She avidly exercises and has a virtual     -   Denies any palpitations    -   Continue Norvasc 5 mg daily    -   Patient has had profound hyponatremia and electrolyte derangement on hydrochlorothiazide        Patient / Kiah Kern was advised and educated to call our office  immediately if  patient has any new symptoms of chest pain/shortness of breath, near-syncope, syncope, light headedness sustained palpitations  or any other cardiovascular symptoms before their scheduled follow-up appointment. Office number was provided #[phone]. Please call [phone] if any questions. Counseling :  A description of the counseling. Goals and Barriers. Patient's ability to self care: Yes  Medication side effect reviewed with patient in detail and all their questions answered to their satisfaction. HPI :     Brian Ramos is a 51y.o. year old female who came for 6-month follow up. She has been doing well since her last office visit and offers no complaints. She exercises on a regular basis and denies any difficulty performing activities. She denies any recent palpitations. Her only concern today is that at times she will have some mild lower extremity edema at the end of the day. Did discuss with her that this is most likely secondary to her Norvasc. In the past patient had been on hydrochlorothiazide which caused profound hyponatremia. Patient does not want to change antihypertensive agent at this time. Review of Systems   Constitutional: Negative. Negative for activity change, diaphoresis and fever. HENT: Negative. Negative for congestion, ear pain, sinus pressure and trouble swallowing. Eyes: Negative. Negative for photophobia and visual disturbance. Respiratory: Negative. Negative for chest tightness and shortness of breath. Cardiovascular: Negative. Negative for chest pain, palpitations and leg swelling. Gastrointestinal: Negative. Negative for abdominal distention, blood in stool, constipation and diarrhea. Endocrine: Negative. Negative for polydipsia, polyphagia and polyuria. Genitourinary: Negative. Negative for difficulty urinating. Musculoskeletal: Negative. Skin: Negative. Neurological: Negative. Negative for dizziness. Hematological: Negative. Psychiatric/Behavioral: Negative.          Historical Information   Past Medical History:   Diagnosis Date    Achilles bursitis of left lower extremity     Anxiety     Asthma     Depression     Primary hypertension      Past Surgical History:   Procedure Laterality Date    COLONOSCOPY      TONSILLECTOMY       Social History     Substance and Sexual Activity   Alcohol Use Yes    Comment: social      Social History     Substance and Sexual Activity   Drug Use No     Social History     Tobacco Use   Smoking Status Never   Smokeless Tobacco Never     Family History:   Family History   Problem Relation Age of Onset    Ulcerative colitis Mother     Hypertension Mother     Lung cancer Mother     Hypertension Father     Lung cancer Father     Lung cancer Family     No Known Problems Sister     No Known Problems Maternal Uncle     No Known Problems Paternal Aunt     No Known Problems Paternal Uncle     No Known Problems Maternal Grandfather     No Known Problems Paternal Grandmother     No Known Problems Paternal Grandfather     Breast cancer Other     ADD / ADHD Neg Hx     Anesthesia problems Neg Hx     Cancer Neg Hx     Clotting disorder Neg Hx     Collagen disease Neg Hx Diabetes Neg Hx     Dislocations Neg Hx     Learning disabilities Neg Hx     Neurological problems Neg Hx     Osteoporosis Neg Hx     Rheumatologic disease Neg Hx     Scoliosis Neg Hx     Vascular Disease Neg Hx        Meds/Allergies     Allergies   Allergen Reactions    Lisinopril Itching    Cephalexin Rash    Penicillins Rash       Current Outpatient Medications:     albuterol (PROVENTIL HFA,VENTOLIN HFA) 90 mcg/act inhaler, Inhale 2 puffs every 6 (six) hours as needed for wheezing, Disp: 8 g, Rfl: 0    amLODIPine (NORVASC) 5 mg tablet, Take 1 tablet (5 mg total) by mouth daily, Disp: 90 tablet, Rfl: 1    baclofen 10 mg tablet, Take 1 tablet (10 mg total) by mouth 3 (three) times a day, Disp: 90 tablet, Rfl: 0    omeprazole (PriLOSEC) 40 MG capsule, Take 1 capsule (40 mg total) by mouth daily, Disp: 90 capsule, Rfl: 3    baclofen 10 mg tablet, take 1 tablet by mouth three times a day (Patient not taking: Reported on 12/6/2023), Disp: 90 tablet, Rfl: 0    fluticasone (Flovent Diskus) 50 MCG/BLIST diskus inhaler, Inhale 1 puff 2 (two) times a day Rinse mouth after use. (Patient not taking: Reported on 9/7/2023), Disp: 60 blister, Rfl: 6    gabapentin (Neurontin) 300 mg capsule, Take 1 capsule (300 mg total) by mouth daily at bedtime (Patient not taking: Reported on 12/6/2023), Disp: 30 capsule, Rfl: 3    montelukast (SINGULAIR) 10 mg tablet, Take 1 tablet (10 mg total) by mouth daily at bedtime (Patient not taking: Reported on 4/26/2023), Disp: 90 tablet, Rfl: 1    Vitals: Blood pressure 138/80, pulse 66, height 5' 8" (1.727 m), weight 79.4 kg (175 lb), SpO2 98 %. Body mass index is 26.61 kg/m².   Wt Readings from Last 3 Encounters:   12/06/23 79.4 kg (175 lb)   09/13/23 80.7 kg (177 lb 14.6 oz)   08/24/23 80.7 kg (178 lb)     Vitals:    12/06/23 1003   Weight: 79.4 kg (175 lb)     BP Readings from Last 3 Encounters:   12/06/23 138/80   09/13/23 127/73   08/24/23 160/92       Physical Exam:  Physical Exam  Vitals and nursing note reviewed. Constitutional:       General: She is not in acute distress. Appearance: Normal appearance. She is normal weight. HENT:      Right Ear: External ear normal.      Left Ear: External ear normal.   Eyes:      General: No scleral icterus. Right eye: No discharge. Left eye: No discharge. Cardiovascular:      Rate and Rhythm: Normal rate and regular rhythm. Pulses: Normal pulses. Heart sounds: Normal heart sounds. Pulmonary:      Effort: Pulmonary effort is normal. No respiratory distress. Breath sounds: Normal breath sounds. Abdominal:      General: Bowel sounds are normal. There is no distension. Palpations: Abdomen is soft. Musculoskeletal:      Right lower leg: No edema. Left lower leg: No edema. Skin:     General: Skin is warm and dry. Capillary Refill: Capillary refill takes less than 2 seconds. Neurological:      General: No focal deficit present. Mental Status: She is alert and oriented to person, place, and time. Mental status is at baseline. Psychiatric:         Mood and Affect: Mood normal.           Diagnostic Studies Review Cardio:    EKG:  Sinus rhythm        [facility]  Cardiology        "This note was completed in part utilizing [employer] direct voice recognition software. Grammatical errors, random word insertion, spelling mistakes, and incomplete sentences may be an occasional consequence of the system secondary to software limitations, ambient noise and hardware issues. Please read the chart carefully and recognize, using context, where substitutions have occurred.   If you have any questions or concerns about the context, text or information contained within the body of this dictation, please contact myself, the provider, for further clarification."

## 2023-12-10 DIAGNOSIS — G89.29 CHRONIC BILATERAL LOW BACK PAIN WITHOUT SCIATICA: ICD-10-CM

## 2023-12-10 DIAGNOSIS — M54.50 CHRONIC BILATERAL LOW BACK PAIN WITHOUT SCIATICA: ICD-10-CM

## 2023-12-10 DIAGNOSIS — I10 PRIMARY HYPERTENSION: ICD-10-CM

## 2023-12-11 RX ORDER — AMLODIPINE BESYLATE 5 MG/1
5 TABLET ORAL DAILY
Qty: 90 TABLET | Refills: 1 | Status: SHIPPED | OUTPATIENT
Start: 2023-12-11

## 2023-12-11 RX ORDER — BACLOFEN 10 MG/1
10 TABLET ORAL 3 TIMES DAILY
Qty: 90 TABLET | Refills: 0 | Status: SHIPPED | OUTPATIENT
Start: 2023-12-11

## 2023-12-12 ENCOUNTER — OFFICE VISIT (OUTPATIENT)
Dept: GASTROENTEROLOGY | Facility: CLINIC | Age: 48
End: 2023-12-12
Payer: COMMERCIAL

## 2023-12-12 VITALS
DIASTOLIC BLOOD PRESSURE: 81 MMHG | HEIGHT: 68 IN | OXYGEN SATURATION: 98 % | WEIGHT: 177 LBS | SYSTOLIC BLOOD PRESSURE: 126 MMHG | HEART RATE: 61 BPM | BODY MASS INDEX: 26.83 KG/M2

## 2023-12-12 DIAGNOSIS — R19.8 IRREGULAR BOWEL HABITS: Primary | ICD-10-CM

## 2023-12-12 PROCEDURE — 99214 OFFICE O/P EST MOD 30 MIN: CPT | Performed by: INTERNAL MEDICINE

## 2023-12-12 NOTE — PATIENT INSTRUCTIONS
For your bowel habits, as we discussed during today's visit,  - I would recommend starting psyllium, fiber supplementation. This can be done with use of Konsyl, Citrucil, Metamucil or Benefiber fiber, which can be purchased over-the-counter. I would recommend starting slow with 1 tsp mixed into a large glass of water, once a day, and increasing to goal of 1 tbsp mixed into a large glass of water per day.   - this helps with both diarrhea and constipation, helping to bulk the stool, and should not cause constipation or diarrhea, but treat both  - the only side effect of this can be bloating, if this occurs, cut down on the dose, and increase your water intake or alternatively, consider switching to an alternative as listed above

## 2023-12-12 NOTE — PROGRESS NOTES
[address] [place] [address] [facility] - Outpatient Note  Gonzales Ventura 51 y.o. female MRN: [id number]  Unit/Bed#:  Encounter: [id number]          ASSESSMENT AND PLAN:      1. Irregular bowel habits       Intermittent loose stools, without affect on quality of life  Objective evaluation has been unremarkable including colonoscopy with random colon biopsies  She does not have celiac disease and the biopsies of her stomach and esophagus were also essentially normal.   +TTG  IgG - possible gluten sensitivity, but would defer gluten free dietary trial at this time as her symptoms are relatively mild and not affecting her QOL  She was recommended to try psyllium fiber 1 tablespoon mixed into a glass of water once a day. If things get worse, her stool studies are ordered and we can always start there. Follow-up. The patient will follow up in 1 year or as needed. ______________________________________________________________________    HPI:     Patient is a 42-year-old female who is seen in follow-up for diarrhea. 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 She states she feels the same since her last visit. She continues to experience loose stools once every 2 days or so, but not daily. She did not try the Metamucil or psyllium fiber that was discussed at her last visit because she forgot about it. She does not take laxatives. She tries to eat healthy overall and currently denies any abdominal pain.         Historical Information   Past Medical History:   Diagnosis Date    Achilles bursitis of left lower extremity     Anxiety     Asthma     Depression     Primary hypertension      Past Surgical History:   Procedure Laterality Date    COLONOSCOPY      EGD AND COLONOSCOPY  09/13/2023    TONSILLECTOMY       Social History   Social History     Substance and Sexual Activity   Alcohol Use Yes    Comment: social      Social History     Substance and Sexual Activity   Drug Use No     Social History     Tobacco Use   Smoking Status Never   Smokeless Tobacco Never     Family History   Problem Relation Age of Onset    Ulcerative colitis Mother     Hypertension Mother     Lung cancer Mother     Hypertension Father     Lung cancer Father     Lung cancer Family     No Known Problems Sister     No Known Problems Maternal Uncle     No Known Problems Paternal Aunt     No Known Problems Paternal Uncle     No Known Problems Maternal Grandfather     No Known Problems Paternal Grandmother     No Known Problems Paternal Grandfather     Breast cancer Other     ADD / ADHD Neg Hx     Anesthesia problems Neg Hx     Cancer Neg Hx     Clotting disorder Neg Hx     Collagen disease Neg Hx     Diabetes Neg Hx     Dislocations Neg Hx     Learning disabilities Neg Hx     Neurological problems Neg Hx     Osteoporosis Neg Hx     Rheumatologic disease Neg Hx     Scoliosis Neg Hx     Vascular Disease Neg Hx        Meds/Allergies       Current Outpatient Medications:     albuterol (PROVENTIL HFA,VENTOLIN HFA) 90 mcg/act inhaler    amLODIPine (NORVASC) 5 mg tablet    baclofen 10 mg tablet    omeprazole (PriLOSEC) 40 MG capsule    baclofen 10 mg tablet    fluticasone (Flovent Diskus) 50 MCG/BLIST diskus inhaler    gabapentin (Neurontin) 300 mg capsule    montelukast (SINGULAIR) 10 mg tablet    Allergies   Allergen Reactions    Lisinopril Itching    Cephalexin Rash    Penicillins Rash           Objective     Blood pressure 126/81, pulse 61, height 5' 8" (1.727 m), weight 80.3 kg (177 lb), SpO2 98 %. PHYSICAL EXAM:        Lab Results:   No visits with results within 1 Day(s) from this visit.    Latest known visit with results is:   Hospital Outpatient Visit on 09/13/2023   Component Date Value    Case Report 09/13/2023                      Value:Surgical Pathology Report Case: [id number]                                   Authorizing Provider:  Too Doyle MD          Collected:           09/13/2023 1158              Ordering Location:     [facility]   Received:            09/13/2023 [address] [place] [address]                                                                       Pathologist:           Lloyd Duarte MD                                                             Specimens:   A) - Duodenum, bx duodenum r/o celiac                                                               B) - Stomach, bx stomach r/o h pylori                                                               C) - Esophagus, GE junction r/o esophagitis                                                         D) - Colon, random colon bx                                                                Final Diagnosis 09/13/2023                      Value: This result contains rich text formatting which cannot be displayed here. Additional Information 09/13/2023                      Value: This result contains rich text formatting which cannot be displayed here. Gross Description 09/13/2023                      Value: This result contains rich text formatting which cannot be displayed here. Laboratory Studies. Biopsies were negative for celiac disease. Biopsy of stomach and esophagus were essentially normal.    Testing. Colonoscopy was reviewed with the patient. Results were normal.    Transcribed for Too Doyle MD, by Lola Perera on 12/12/23 at 10:05 AM. Powered by [employer].

## 2023-12-22 ENCOUNTER — TELEPHONE (OUTPATIENT)
Dept: PULMONOLOGY | Facility: MEDICAL CENTER | Age: 48
End: 2023-12-22

## 2024-02-02 ENCOUNTER — TELEPHONE (OUTPATIENT)
Dept: OTOLARYNGOLOGY | Facility: CLINIC | Age: 49
End: 2024-02-02

## 2024-02-21 ENCOUNTER — OFFICE VISIT (OUTPATIENT)
Dept: FAMILY MEDICINE CLINIC | Facility: CLINIC | Age: 49
End: 2024-02-21
Payer: COMMERCIAL

## 2024-02-21 VITALS
HEART RATE: 66 BPM | DIASTOLIC BLOOD PRESSURE: 80 MMHG | BODY MASS INDEX: 26.83 KG/M2 | SYSTOLIC BLOOD PRESSURE: 132 MMHG | TEMPERATURE: 98 F | RESPIRATION RATE: 16 BRPM | HEIGHT: 68 IN | OXYGEN SATURATION: 99 % | WEIGHT: 177 LBS

## 2024-02-21 DIAGNOSIS — M54.17 LUMBOSACRAL RADICULOPATHY: Primary | ICD-10-CM

## 2024-02-21 PROCEDURE — 99213 OFFICE O/P EST LOW 20 MIN: CPT | Performed by: FAMILY MEDICINE

## 2024-02-21 RX ORDER — PREDNISONE 20 MG/1
40 TABLET ORAL DAILY
Qty: 10 TABLET | Refills: 1 | Status: SHIPPED | OUTPATIENT
Start: 2024-02-21 | End: 2024-02-26

## 2024-02-21 NOTE — PROGRESS NOTES
Assessment/Plan:  Diagnoses and all orders for this visit:    Lumbosacral radiculopathy  -     predniSONE 20 mg tablet; Take 2 tablets (40 mg total) by mouth daily for 5 days Take w food.    Cont Baclofen and gabapentin prn  T/c restart PT    Subjective:      Patient ID: Julisa Teran is a 48 y.o. female.    Chief Complaint   Patient presents with   • Sciatica     Numbness of right leg started a week ago        Back Pain  This is a recurrent problem. The pain is present in the lumbar spine. The quality of the pain is described as burning, shooting and aching. The pain radiates to the right thigh, right knee and right foot. The symptoms are aggravated by position, stress, bending and twisting. Associated symptoms include leg pain, numbness, tingling and weakness. Pertinent negatives include no bladder incontinence, bowel incontinence or fever. Risk factors include history of steroid use.     The following portions of the patient's history were reviewed and updated as appropriate: allergies, current medications, past family history, past medical history, past social history, past surgical history and problem list.  Past Medical History:   Diagnosis Date   • Achilles bursitis of left lower extremity    • Anxiety    • Asthma    • Depression    • Primary hypertension      Past Surgical History:   Procedure Laterality Date   • COLONOSCOPY     • EGD AND COLONOSCOPY  09/13/2023   • TONSILLECTOMY        Review of Systems   Constitutional:  Positive for diaphoresis. Negative for fever.   Cardiovascular: Negative.    Gastrointestinal:  Negative for bowel incontinence.   Genitourinary:  Negative for bladder incontinence.   Musculoskeletal:  Positive for back pain.   Skin:  Negative for rash.   Neurological:  Positive for tingling, weakness and numbness.        Right lumbar radiculopathy         Current Outpatient Medications   Medication Sig Dispense Refill   • albuterol (PROVENTIL HFA,VENTOLIN HFA) 90 mcg/act inhaler Inhale  "2 puffs every 6 (six) hours as needed for wheezing 8 g 0   • amLODIPine (NORVASC) 5 mg tablet Take 1 tablet (5 mg total) by mouth daily 90 tablet 1   • baclofen 10 mg tablet take 1 tablet by mouth three times a day 90 tablet 0   • omeprazole (PriLOSEC) 40 MG capsule Take 1 capsule (40 mg total) by mouth daily 90 capsule 3   • baclofen 10 mg tablet Take 1 tablet (10 mg total) by mouth 3 (three) times a day 90 tablet 0   • fluticasone (Flovent Diskus) 50 MCG/BLIST diskus inhaler Inhale 1 puff 2 (two) times a day Rinse mouth after use. (Patient not taking: Reported on 9/7/2023) 60 blister 6   • gabapentin (Neurontin) 300 mg capsule Take 1 capsule (300 mg total) by mouth daily at bedtime 30 capsule 0     No current facility-administered medications for this visit.       Objective:    /80 (BP Location: Left arm, Patient Position: Sitting, Cuff Size: Large)   Pulse 66   Temp 98 °F (36.7 °C)   Resp 16   Ht 5' 8\" (1.727 m)   Wt 80.3 kg (177 lb)   SpO2 99%   BMI 26.91 kg/m²        Physical Exam  Vitals and nursing note reviewed.   Constitutional:       General: She is not in acute distress.     Comments: OW   Cardiovascular:      Rate and Rhythm: Normal rate and regular rhythm.   Pulmonary:      Effort: Pulmonary effort is normal. No respiratory distress.      Breath sounds: Normal breath sounds.   Abdominal:      Tenderness: There is no right CVA tenderness or left CVA tenderness.   Musculoskeletal:         General: Tenderness (b/l LS paravertebral tenderness-R>L) present. Normal range of motion.      Cervical back: Neck supple.   Skin:     General: Skin is warm and dry.      Coloration: Skin is not jaundiced.      Findings: No rash.   Neurological:      General: No focal deficit present.      Mental Status: She is alert and oriented to person, place, and time.      Cranial Nerves: No cranial nerve deficit.           Melissa Garcia MD  "

## 2024-02-25 DIAGNOSIS — M54.41 CHRONIC LEFT-SIDED LOW BACK PAIN WITH RIGHT-SIDED SCIATICA: ICD-10-CM

## 2024-02-25 DIAGNOSIS — G89.29 CHRONIC BILATERAL LOW BACK PAIN WITHOUT SCIATICA: ICD-10-CM

## 2024-02-25 DIAGNOSIS — G89.29 CHRONIC LEFT-SIDED LOW BACK PAIN WITH RIGHT-SIDED SCIATICA: ICD-10-CM

## 2024-02-25 DIAGNOSIS — M54.50 CHRONIC BILATERAL LOW BACK PAIN WITHOUT SCIATICA: ICD-10-CM

## 2024-02-25 RX ORDER — GABAPENTIN 300 MG/1
300 CAPSULE ORAL
Qty: 30 CAPSULE | Refills: 0 | Status: SHIPPED | OUTPATIENT
Start: 2024-02-25

## 2024-02-27 RX ORDER — BACLOFEN 10 MG/1
10 TABLET ORAL 3 TIMES DAILY
Qty: 90 TABLET | Refills: 0 | Status: SHIPPED | OUTPATIENT
Start: 2024-02-27

## 2024-03-29 DIAGNOSIS — G89.29 CHRONIC LEFT-SIDED LOW BACK PAIN WITH RIGHT-SIDED SCIATICA: ICD-10-CM

## 2024-03-29 DIAGNOSIS — M54.41 CHRONIC LEFT-SIDED LOW BACK PAIN WITH RIGHT-SIDED SCIATICA: ICD-10-CM

## 2024-03-29 DIAGNOSIS — G89.29 CHRONIC BILATERAL LOW BACK PAIN WITHOUT SCIATICA: ICD-10-CM

## 2024-03-29 DIAGNOSIS — M54.50 CHRONIC BILATERAL LOW BACK PAIN WITHOUT SCIATICA: ICD-10-CM

## 2024-03-29 RX ORDER — BACLOFEN 10 MG/1
10 TABLET ORAL 3 TIMES DAILY
Qty: 90 TABLET | Refills: 0 | Status: SHIPPED | OUTPATIENT
Start: 2024-03-29

## 2024-03-29 RX ORDER — GABAPENTIN 300 MG/1
300 CAPSULE ORAL
Qty: 30 CAPSULE | Refills: 5 | Status: SHIPPED | OUTPATIENT
Start: 2024-03-29

## 2024-06-01 DIAGNOSIS — M54.50 CHRONIC BILATERAL LOW BACK PAIN WITHOUT SCIATICA: ICD-10-CM

## 2024-06-01 DIAGNOSIS — I10 PRIMARY HYPERTENSION: ICD-10-CM

## 2024-06-01 DIAGNOSIS — M54.41 CHRONIC LEFT-SIDED LOW BACK PAIN WITH RIGHT-SIDED SCIATICA: ICD-10-CM

## 2024-06-01 DIAGNOSIS — G89.29 CHRONIC BILATERAL LOW BACK PAIN WITHOUT SCIATICA: ICD-10-CM

## 2024-06-01 DIAGNOSIS — J45.40 MODERATE PERSISTENT ASTHMA WITHOUT COMPLICATION: ICD-10-CM

## 2024-06-01 DIAGNOSIS — G89.29 CHRONIC LEFT-SIDED LOW BACK PAIN WITH RIGHT-SIDED SCIATICA: ICD-10-CM

## 2024-06-01 RX ORDER — ALBUTEROL SULFATE 90 UG/1
2 AEROSOL, METERED RESPIRATORY (INHALATION) EVERY 6 HOURS PRN
Qty: 8 G | Refills: 5 | Status: SHIPPED | OUTPATIENT
Start: 2024-06-01

## 2024-06-01 RX ORDER — GABAPENTIN 300 MG/1
300 CAPSULE ORAL
Qty: 30 CAPSULE | Refills: 5 | Status: SHIPPED | OUTPATIENT
Start: 2024-06-01

## 2024-06-01 RX ORDER — AMLODIPINE BESYLATE 5 MG/1
5 TABLET ORAL DAILY
Qty: 90 TABLET | Refills: 1 | Status: SHIPPED | OUTPATIENT
Start: 2024-06-01

## 2024-06-03 ENCOUNTER — OFFICE VISIT (OUTPATIENT)
Dept: OBGYN CLINIC | Facility: CLINIC | Age: 49
End: 2024-06-03
Payer: COMMERCIAL

## 2024-06-03 VITALS
SYSTOLIC BLOOD PRESSURE: 128 MMHG | WEIGHT: 173 LBS | BODY MASS INDEX: 27.15 KG/M2 | DIASTOLIC BLOOD PRESSURE: 80 MMHG | HEIGHT: 67 IN

## 2024-06-03 DIAGNOSIS — N95.9 POSTMENOPAUSAL SYMPTOMS: ICD-10-CM

## 2024-06-03 DIAGNOSIS — N95.1 VASOMOTOR SYMPTOMS DUE TO MENOPAUSE: Primary | ICD-10-CM

## 2024-06-03 PROCEDURE — 99214 OFFICE O/P EST MOD 30 MIN: CPT | Performed by: STUDENT IN AN ORGANIZED HEALTH CARE EDUCATION/TRAINING PROGRAM

## 2024-06-03 RX ORDER — BACLOFEN 10 MG/1
10 TABLET ORAL 3 TIMES DAILY
Qty: 90 TABLET | Refills: 0 | Status: SHIPPED | OUTPATIENT
Start: 2024-06-03

## 2024-06-03 RX ORDER — NORETHINDRONE ACETATE AND ETHINYL ESTRADIOL .0025; .5 MG/1; MG/1
1 TABLET, FILM COATED ORAL DAILY
Qty: 28 TABLET | Refills: 3 | Status: SHIPPED | OUTPATIENT
Start: 2024-06-03

## 2024-06-03 NOTE — PROGRESS NOTES
"Ambulatory Visit  Name: Julisa Teran      : 1975      MRN: [id number]  Encounter Provider: Moriah Jackson MD  Encounter Date: 6/3/2024   Encounter department: [facility] [place]    Assessment & Plan   1. Vasomotor symptoms due to menopause  -     norethindrone-ethinyl estradiol (Fyavolv) 0.5-2.5 MG-MCG per tablet; Take 1 tablet by mouth daily  2. Postmenopausal symptoms  -     norethindrone-ethinyl estradiol (Fyavolv) 0.5-2.5 MG-MCG per tablet; Take 1 tablet by mouth daily    reviewed spectrum of treatment for menopausal symptoms including but not limited to: SSRIs, gabapentin, and HRT (associated with risk of CVD, DVT/VTE--she is overall very low risk, 1.2% however recommend completion of lipid panel as ordered by PCP to calculate 10-year ASCVD risk score). Will need to monitor lipid panel, blood pressure, and contact office if any major changes in her CVD risk.  Has already implemented significant lifestyle interventions including reduction of caffeine and alcohol, and increase in physical activity  Will start prempro, RTO in 2-3 months to follow-up improvement  Discussed will consider separate progesterone and estrogen if too expensive    History of Present Illness     Julisa Teran is a 48 y.o. female who presents for vasomotor symptoms  Hot flashes started around 41  Night sweats  Insomnia--only sleeping about 5 hours a not  Affecting focus    Last period in   Lost 50 pounds--diet and exercise  On a search and rescue team, elliptical frequently    Some vaginal dryness, nothing major  Occasional SYL, working out has improved it, working with a PT    On amlodipine 5mg  No tobacco  No hx DVT/VTE  No migraines  No heart attack    Review of Systems  --per HPI      Objective     /80 (BP Location: Right arm, Patient Position: Sitting)   Ht 5' 7\" (1.702 m)   Wt 78.5 kg (173 lb)   BMI 27.10 kg/m²     Physical Exam  Constitutional:       Appearance: Normal " appearance.   HENT:      Head: Normocephalic and atraumatic.   Eyes:      Extraocular Movements: Extraocular movements intact.      Conjunctiva/sclera: Conjunctivae normal.   Pulmonary:      Effort: Pulmonary effort is normal.   Musculoskeletal:         General: Normal range of motion.      Cervical back: Normal range of motion.   Neurological:      General: No focal deficit present.      Mental Status: She is alert.   Psychiatric:         Mood and Affect: Mood normal.         Behavior: Behavior normal.         Thought Content: Thought content normal.       Administrative Statements

## 2024-07-10 ENCOUNTER — NURSE TRIAGE (OUTPATIENT)
Age: 49
End: 2024-07-10

## 2024-07-10 NOTE — TELEPHONE ENCOUNTER
"Pt was last seen on 12/6/2023. Received call from pt stating for the past week, she has been feeling her heart beating more prominently at night that is worse when laying down. She states it forces her to sit propped up with pillows. She stated she does not feel her heart racing, but more feels her heart beating more prominently. She states when she finally does fall asleep, she feels better when she wakes up. She states her BP's have been okay, today it was 116/74, HR ranges from high 60s to low 70s. Pt is on amlodipine 5mg daily.Denies sob, but states she has been more fatigued lately, stating she only got 4 hours of sleep last night. Denies dizziness/lightheadedness, fever, chills, cough, N/V. States has has bad allergies in the beginning of the week.    Of note, pt is currently on a business trip in [place] and is not sure if anxiety has a role in this.     Advised pt since she continues to have this chest pressure feeling and is out of state, pt should be evaluated in the ED. Pt stated should would rather not but will if the symptoms get worse.    Appt made for pt for 8/8/2024 since she does not return from her trip until 8/7. Advised pt will also send a message to the provider to review and advise.        Reason for Disposition   Chest pain or 'angina' comes and goes and is happening more often (increasing in frequency) or getting worse (increasing in severity) (Exception: chest pains that last only a few seconds)    Answer Assessment - Initial Assessment Questions  1. LOCATION: \"Where does it hurt?\"        Chest palpitations going on for one week worse at night. During the day, does not get the palpitations. Is one month and half in to starting hormone therapy. Stated only had 4 hours of sleep last night. States she wakes up feeling better. 116/74 BP this am. States she feels the chest palpitations are worse when taking BP. States she does not feel her heart is racing but feels lately has been feeling her heart " "beating. HR's average in the high 60s-low70s  2. RADIATION: \"Does the pain go anywhere else?\" (e.g., into neck, jaw, arms, back)      denies  3. ONSET: \"When did the chest pain begin?\" (Minutes, hours or days)       One week ago   4. PATTERN \"Does the pain come and go, or has it been constant since it started?\"  \"Does it get worse with exertion?\"       She states it is worse when laying down or laying on side. States it forces her to sit up to sleep  5. DURATION: \"How long does it last\" (e.g., seconds, minutes, hours)      Lasts until she falls asleep   6. SEVERITY: \"How bad is the pain?\"  (e.g., Scale 1-10; mild, moderate, or severe)     - MILD (1-3): doesn't interfere with normal activities      - MODERATE (4-7): interferes with normal activities or awakens from sleep     - SEVERE (8-10): excruciating pain, unable to do any normal activities        moderate  7. CARDIAC RISK FACTORS: \"Do you have any history of heart problems or risk factors for heart disease?\" (e.g., angina, prior heart attack; diabetes, high blood pressure, high cholesterol, smoker, or strong family history of heart disease)      HTN - on Norvasc 5mg daily   8. PULMONARY RISK FACTORS: \"Do you have any history of lung disease?\"  (e.g., blood clots in lung, asthma, emphysema, birth control pills)      Asthma in the past  9. CAUSE: \"What do you think is causing the chest pain?\"      Unsure - maybe anxiety   10. OTHER SYMPTOMS: \"Do you have any other symptoms?\" (e.g., dizziness, nausea, vomiting, sweating, fever, difficulty breathing, cough)        Denies sob, but states she has been more fatigued lately. Denies dizziness/lightheadedness, fever, chills, cough, N/V. States has has bad allergies in the beginning of the week.    Protocols used: Chest Pain-ADULT-[place]    "

## 2024-07-11 DIAGNOSIS — R00.2 PALPITATIONS: ICD-10-CM

## 2024-07-11 DIAGNOSIS — I10 PRIMARY HYPERTENSION: Primary | ICD-10-CM

## 2024-08-01 ENCOUNTER — TELEPHONE (OUTPATIENT)
Age: 49
End: 2024-08-01

## 2024-08-01 DIAGNOSIS — J45.40 MODERATE PERSISTENT ASTHMA WITHOUT COMPLICATION: ICD-10-CM

## 2024-08-01 DIAGNOSIS — M54.50 CHRONIC BILATERAL LOW BACK PAIN WITHOUT SCIATICA: ICD-10-CM

## 2024-08-01 DIAGNOSIS — M54.41 CHRONIC LEFT-SIDED LOW BACK PAIN WITH RIGHT-SIDED SCIATICA: ICD-10-CM

## 2024-08-01 DIAGNOSIS — G89.29 CHRONIC BILATERAL LOW BACK PAIN WITHOUT SCIATICA: ICD-10-CM

## 2024-08-01 DIAGNOSIS — G89.29 CHRONIC LEFT-SIDED LOW BACK PAIN WITH RIGHT-SIDED SCIATICA: ICD-10-CM

## 2024-08-01 RX ORDER — GABAPENTIN 300 MG/1
300 CAPSULE ORAL
Qty: 30 CAPSULE | Refills: 5 | Status: SHIPPED | OUTPATIENT
Start: 2024-08-01

## 2024-08-01 RX ORDER — BACLOFEN 10 MG/1
10 TABLET ORAL 3 TIMES DAILY
Qty: 90 TABLET | Refills: 5 | Status: SHIPPED | OUTPATIENT
Start: 2024-08-01

## 2024-08-01 RX ORDER — ALBUTEROL SULFATE 90 UG/1
2 AEROSOL, METERED RESPIRATORY (INHALATION) EVERY 6 HOURS PRN
Qty: 8 G | Refills: 1 | Status: SHIPPED | OUTPATIENT
Start: 2024-08-01

## 2024-08-02 DIAGNOSIS — R41.840 ATTENTION AND CONCENTRATION DEFICIT: Primary | ICD-10-CM

## 2024-08-05 ENCOUNTER — OFFICE VISIT (OUTPATIENT)
Dept: CARDIOLOGY CLINIC | Facility: CLINIC | Age: 49
End: 2024-08-05
Payer: COMMERCIAL

## 2024-08-05 VITALS
DIASTOLIC BLOOD PRESSURE: 70 MMHG | HEIGHT: 67 IN | OXYGEN SATURATION: 99 % | BODY MASS INDEX: 26.37 KG/M2 | SYSTOLIC BLOOD PRESSURE: 118 MMHG | WEIGHT: 168 LBS | HEART RATE: 62 BPM

## 2024-08-05 DIAGNOSIS — I10 PRIMARY HYPERTENSION: Primary | ICD-10-CM

## 2024-08-05 DIAGNOSIS — I49.3 PVC'S (PREMATURE VENTRICULAR CONTRACTIONS): ICD-10-CM

## 2024-08-05 DIAGNOSIS — R00.2 PALPITATION: ICD-10-CM

## 2024-08-05 DIAGNOSIS — Z82.49 FAMILY HISTORY OF ABDOMINAL AORTIC ANEURYSM (AAA): ICD-10-CM

## 2024-08-05 PROCEDURE — 99214 OFFICE O/P EST MOD 30 MIN: CPT | Performed by: NURSE PRACTITIONER

## 2024-08-05 PROCEDURE — 93000 ELECTROCARDIOGRAM COMPLETE: CPT | Performed by: NURSE PRACTITIONER

## 2024-08-05 RX ORDER — HYDROXYZINE HYDROCHLORIDE 10 MG/1
10 TABLET, FILM COATED ORAL EVERY 8 HOURS PRN
COMMUNITY

## 2024-08-05 RX ORDER — METOPROLOL SUCCINATE 25 MG/1
25 TABLET, EXTENDED RELEASE ORAL DAILY
COMMUNITY
End: 2024-08-05 | Stop reason: SDUPTHER

## 2024-08-05 RX ORDER — METOPROLOL SUCCINATE 25 MG/1
25 TABLET, EXTENDED RELEASE ORAL DAILY
Qty: 90 TABLET | Refills: 1 | Status: SHIPPED | OUTPATIENT
Start: 2024-08-05

## 2024-08-05 NOTE — PROGRESS NOTES
Progress Note - Cardiology Office  [facility]    Julisa Teran 48 y.o. female MRN: [id number]  : 1975  Encounter: [id number]      Assessment:     1. Primary hypertension        Discussion Summary and Plan:      Patient / Caretaker was advised and educated to call our office  immediately if  patient has any new symptoms of chest pain/shortness of breath, near-syncope, syncope, light headedness sustained palpitations  or any other cardiovascular symptoms before their scheduled follow-up appointment.  Office number was provided #[phone].  Please call [phone] if any questions.  Counseling :  A description of the counseling.  Goals and Barriers.  Patient's ability to self care: Yes  Medication side effect reviewed with patient in detail and all their questions answered to their satisfaction.    HPI :     Julisa Teran is a 48 y.o. year old female who came for follow up. Patient has    Review of Systems    Historical Information   Past Medical History:   Diagnosis Date    Achilles bursitis of left lower extremity     Anxiety     Asthma     Depression     Primary hypertension      Past Surgical History:   Procedure Laterality Date    COLONOSCOPY      EGD AND COLONOSCOPY  2023    TONSILLECTOMY       Social History     Substance and Sexual Activity   Alcohol Use Yes    Comment: social      Social History     Substance and Sexual Activity   Drug Use No     Social History     Tobacco Use   Smoking Status Never   Smokeless Tobacco Never     Family History:   Family History   Problem Relation Age of Onset    Ulcerative colitis Mother     Hypertension Mother     Lung cancer Mother     Hypertension Father     Lung cancer Father     Lung cancer Family     No Known Problems Sister     No Known Problems Maternal Uncle     No Known Problems Paternal Aunt     No Known Problems Paternal Uncle     No Known Problems Maternal Grandfather     No Known Problems Paternal Grandmother     No  "Known Problems Paternal Grandfather     Breast cancer Other     ADD / ADHD Neg Hx     Anesthesia problems Neg Hx     Cancer Neg Hx     Clotting disorder Neg Hx     Collagen disease Neg Hx     Diabetes Neg Hx     Dislocations Neg Hx     Learning disabilities Neg Hx     Neurological problems Neg Hx     Osteoporosis Neg Hx     Rheumatologic disease Neg Hx     Scoliosis Neg Hx     Vascular Disease Neg Hx        Meds/Allergies     Allergies   Allergen Reactions    Lisinopril Itching    Cephalexin Rash    Penicillins Rash       Current Outpatient Medications:     albuterol (PROVENTIL HFA,VENTOLIN HFA) 90 mcg/act inhaler, Inhale 2 puffs every 6 (six) hours as needed for wheezing, Disp: 8 g, Rfl: 1    amLODIPine (NORVASC) 5 mg tablet, Take 1 tablet (5 mg total) by mouth daily, Disp: 90 tablet, Rfl: 1    baclofen 10 mg tablet, Take 1 tablet (10 mg total) by mouth 3 (three) times a day, Disp: 90 tablet, Rfl: 5    gabapentin (Neurontin) 300 mg capsule, Take 1 capsule (300 mg total) by mouth daily at bedtime, Disp: 30 capsule, Rfl: 5    metoprolol succinate (TOPROL-XL) 25 mg 24 hr tablet, Take 25 mg by mouth daily, Disp: , Rfl:     norethindrone-ethinyl estradiol (Fyavolv) 0.5-2.5 MG-MCG per tablet, Take 1 tablet by mouth daily, Disp: 28 tablet, Rfl: 3    baclofen 10 mg tablet, take 1 tablet by mouth three times a day (Patient not taking: Reported on 6/3/2024), Disp: 90 tablet, Rfl: 0    fluticasone (Flovent Diskus) 50 MCG/BLIST diskus inhaler, Inhale 1 puff 2 (two) times a day Rinse mouth after use. (Patient not taking: Reported on 9/7/2023), Disp: 60 blister, Rfl: 6    hydrOXYzine HCL (ATARAX) 10 mg tablet, Take 10 mg by mouth every 8 (eight) hours as needed (Patient not taking: Reported on 8/5/2024), Disp: , Rfl:     omeprazole (PriLOSEC) 40 MG capsule, Take 1 capsule (40 mg total) by mouth daily (Patient not taking: Reported on 6/3/2024), Disp: 90 capsule, Rfl: 3    Vitals: Blood pressure 118/70, pulse 62, height 5' 7\" " (1.702 m), weight 76.2 kg (168 lb), SpO2 99%.    Body mass index is 26.31 kg/m².  Wt Readings from Last 3 Encounters:   08/05/24 76.2 kg (168 lb)   06/03/24 78.5 kg (173 lb)   02/21/24 80.3 kg (177 lb)     Vitals:    08/05/24 1200   Weight: 76.2 kg (168 lb)     BP Readings from Last 3 Encounters:   08/05/24 118/70   06/03/24 128/80   02/21/24 132/80       Physical Exam:  Neurologic:  Alert & oriented x 3, no new focal deficits, Not in any acute distress,  Constitutional:  Adequate built, non-toxic appearance   Neck: Normal range of motion, no tenderness,  Neck supple   Respiratory:  Bilateral air entry, mostly clear to auscultation  Cardiovascular: S1-S2 regular with a 2/6 ejection systolic murmur and S4 is present  GI:  Soft, nondistended,  nontender, no hepatosplenomegaly appreciated.  Musculoskeletal:  No edema, no tenderness, no deformities.   Skin:  Well hydrated, no rash   Extremities:  No edema and distal pulses are present  Psychiatric:  Speech and behavior appropriate         Diagnostic Studies Review Cardio:      EKG:    Cardiac testing:   No results found for this or any previous visit.    No results found for this or any previous visit.    No results found for this or any previous visit.    No results found for this or any previous visit.    No results found for this or any previous visit.    No results found for this or any previous visit.    No results found for this or any previous visit.    XR chest pa & lateral    Result Date: 7/10/2024  Narrative: XR CHEST 2 VIEWS CLINICAL INFORMATION: shortness of breath TECHNIQUE: PA and lateral views. 2 images. COMPARISON: No prior chest radiographs available for comparison. FINDINGS: Lungs and pleura: Hyperinflated lungs.  No focal consolidation, pleural effusion or pneumothorax Heart and mediastinum: Cardiomediastinal silhouette is normal size.  Normal pulmonary vasculature Bones and chest wall: No suspicious osseous abnormalities Lines and tubes:  "None.    Impression: Mildly hyperinflated lungs without other definite evidence of acute cardiopulmonary disease. Electronically signed by:  Alexys Morton MD  07/10/2024 08:33 PM [facility] Workstation: [id number]      Imaging:  Chest X-Ray:   XR chest pa & lateral    Result Date: 7/10/2024  Impression Mildly hyperinflated lungs without other definite evidence of acute cardiopulmonary disease. Electronically signed by:  Alexys Morton MD  07/10/2024 08:33 PM [facility] Workstation: [id number]      CT-scan of the chest:     No CTA results available for this patient.  Lab Review   Lab Results   Component Value Date    WBC 6.69 08/29/2023    HGB 12.5 08/29/2023    HCT 35.7 08/29/2023    MCV 90 08/29/2023    RDW 12.1 08/29/2023     08/29/2023     BMP:  Lab Results   Component Value Date    SODIUM 128 (L) 08/29/2023    K 3.6 08/29/2023    CL 90 (L) 08/29/2023    CO2 30 08/29/2023    BUN 15 08/29/2023    CREATININE 0.68 08/29/2023    GLUC 86 12/01/2022    GLUF 80 08/29/2023    CALCIUM 9.2 08/29/2023    EGFR 103 08/29/2023     Troponins:    LFT:  Lab Results   Component Value Date    AST 21 08/29/2023    ALT 19 08/29/2023    ALKPHOS 43 08/29/2023    TP 7.0 08/29/2023    ALB 4.5 08/29/2023      No components found for: \"TSH3\"  Lab Results   Component Value Date    VVI5WTRMWMRZ 3.546 08/29/2023     Lab Results   Component Value Date    HGBA1C 5.7 (H) 08/29/2023     Lipid Profile:   Lab Results   Component Value Date    CHOLESTEROL 168 08/29/2023    HDL 54 08/29/2023    LDLCALC 101 (H) 08/29/2023    TRIG 67 08/29/2023     Lab Results   Component Value Date    CHOLESTEROL 168 08/29/2023    CHOLESTEROL 189 10/26/2022     No results found for: \"CKTOTAL\", \"CKMB\", \"CKMBINDEX\", \"TROPONINI\"  No results found for: \"NTBNP\"   No results found for this or any previous visit (from the past 672 hour(s)).          Dr. Tam Wilcox MD [facility]        \"This note was completed in part utilizing m-modal fluency direct voice recognition software.   " "Grammatical errors, random word insertion, spelling mistakes, and incomplete sentences may be an occasional consequence of the system secondary to software limitations, ambient noise and hardware issues.    Please read the chart carefully and recognize, using context, where substitutions have occurred.  If you have any questions or concerns about the context, text or information contained within the body of this dictation, please contact myself, the provider, for further clarification.\"  "

## 2024-08-05 NOTE — PROGRESS NOTES
Progress Note - Cardiology Office  [facility]    Julisa Teran 48 y.o. female MRN: [id number]  : 1975  Encounter: [id number]      Assessment:     1. Primary hypertension    2. Palpitation    3. PVC's (premature ventricular contractions)    4. Family history of abdominal aortic aneurysm (AAA)        Discussion Summary and Plan:  1. Hypertension: blood pressures currently stable and acceptable with the addition of Toprol-XL    -   continue Norvasc 5 mg daily    -   continue Toprol-XL 25 mg once a day    -   if patient becomes hypotensive would decrease Norvasc 2.5 mg daily    -   patient has had profound hyponatremia and electrolyte imbalances with hydrochlorothiazide    2. Palpitations/PVCs: seen approximately three weeks ago in an emergency room in [place] for complaints of palpitations and found to have frequent PVCs    -   patient started on Toprol-XL 25 mg one today with resolution of her symptoms    -   will continue Toprol-XL 25 mg one today and new prescription is been sent to her pharmacy    -   may return to activity as tolerated, did discuss when she is working out it may take longer for her to reach her target heart rate due to the beta-blocker effect on the heart.    3. Family history of abdominal aortic aneurysm: patient notes that her maternal aunt was just diagnosed with an abdominal aortic aneurysm which will require repair    -   will order CAT scan of the abdomen and pelvis to screen for AAA        Patient / Caretaker was advised and educated to call our office  immediately if  patient has any new symptoms of chest pain/shortness of breath, near-syncope, syncope, light headedness sustained palpitations  or any other cardiovascular symptoms before their scheduled follow-up appointment.  Office number was provided #[phone].    Please call [phone] if any questions.    Counseling :  A description of the counseling.  Goals and Barriers.  Patient's ability to self  care: Yes  Medication side effect reviewed with patient in detail and all their questions answered to their satisfaction.    HPI :     Julisa Teran is a 48 y.o. year old female who came for hospital follow up. She was recently on a business trip to [place] and while there began experiencing palpitations and went to the local emergency room. They found that she was having frequent PVCs at times in a trigeminal fashion. She was started on Toprol-XL 25 mg once a day and discharged from the emergency room. Of note patient recently started hormone replacement with estrogen for hot flashes and menopause. She was then seen in the emergency room in [place] again four days later with complaints of shortness of breath and anxiety. She was given a prescription for Atarax. She does note since starting the Toprol-XL she is had a decrease in her palpitations. Today's EKG demonstrates a single PVC. Her blood pressures are stable and she has not experienced any dizziness or lightheadedness.    Patient notes that her maternal aunt was just recently diagnosed with abdominal aortic aneurysm and family has been told to be screened as this can be genetic.    Review of Systems   Constitutional: Negative.  Negative for activity change and fatigue.   HENT:  Negative for congestion, hearing loss, sneezing and trouble swallowing.    Eyes: Negative.  Negative for photophobia and visual disturbance.   Respiratory:  Negative for chest tightness and shortness of breath.    Cardiovascular:  Positive for palpitations. Negative for chest pain and leg swelling.   Gastrointestinal: Negative.  Negative for abdominal distention, constipation, diarrhea, nausea and vomiting.   Endocrine: Negative.  Negative for polydipsia, polyphagia and polyuria.   Genitourinary: Negative.  Negative for difficulty urinating.   Musculoskeletal: Negative.    Skin: Negative.    Neurological: Negative.  Negative for dizziness, syncope, weakness and light-headedness.    Hematological: Negative.    Psychiatric/Behavioral: Negative.         Historical Information   Past Medical History:   Diagnosis Date    Achilles bursitis of left lower extremity     Anxiety     Asthma     Depression     Primary hypertension      Past Surgical History:   Procedure Laterality Date    COLONOSCOPY      EGD AND COLONOSCOPY  09/13/2023    TONSILLECTOMY       Social History     Substance and Sexual Activity   Alcohol Use Yes    Comment: social      Social History     Substance and Sexual Activity   Drug Use No     Social History     Tobacco Use   Smoking Status Never   Smokeless Tobacco Never     Family History:   Family History   Problem Relation Age of Onset    Ulcerative colitis Mother     Hypertension Mother     Lung cancer Mother     Hypertension Father     Lung cancer Father     Lung cancer Family     No Known Problems Sister     No Known Problems Maternal Uncle     No Known Problems Paternal Aunt     No Known Problems Paternal Uncle     No Known Problems Maternal Grandfather     No Known Problems Paternal Grandmother     No Known Problems Paternal Grandfather     Breast cancer Other     ADD / ADHD Neg Hx     Anesthesia problems Neg Hx     Cancer Neg Hx     Clotting disorder Neg Hx     Collagen disease Neg Hx     Diabetes Neg Hx     Dislocations Neg Hx     Learning disabilities Neg Hx     Neurological problems Neg Hx     Osteoporosis Neg Hx     Rheumatologic disease Neg Hx     Scoliosis Neg Hx     Vascular Disease Neg Hx        Meds/Allergies     Allergies   Allergen Reactions    Lisinopril Itching    Cephalexin Rash    Penicillins Rash       Current Outpatient Medications:     albuterol (PROVENTIL HFA,VENTOLIN HFA) 90 mcg/act inhaler, Inhale 2 puffs every 6 (six) hours as needed for wheezing, Disp: 8 g, Rfl: 1    amLODIPine (NORVASC) 5 mg tablet, Take 1 tablet (5 mg total) by mouth daily, Disp: 90 tablet, Rfl: 1    baclofen 10 mg tablet, Take 1 tablet (10 mg total) by mouth 3 (three) times a day,  "Disp: 90 tablet, Rfl: 5    gabapentin (Neurontin) 300 mg capsule, Take 1 capsule (300 mg total) by mouth daily at bedtime, Disp: 30 capsule, Rfl: 5    metoprolol succinate (TOPROL-XL) 25 mg 24 hr tablet, Take 1 tablet (25 mg total) by mouth daily, Disp: 90 tablet, Rfl: 1    norethindrone-ethinyl estradiol (Fyavolv) 0.5-2.5 MG-MCG per tablet, Take 1 tablet by mouth daily, Disp: 28 tablet, Rfl: 3    baclofen 10 mg tablet, take 1 tablet by mouth three times a day (Patient not taking: Reported on 6/3/2024), Disp: 90 tablet, Rfl: 0    fluticasone (Flovent Diskus) 50 MCG/BLIST diskus inhaler, Inhale 1 puff 2 (two) times a day Rinse mouth after use. (Patient not taking: Reported on 9/7/2023), Disp: 60 blister, Rfl: 6    hydrOXYzine HCL (ATARAX) 10 mg tablet, Take 10 mg by mouth every 8 (eight) hours as needed (Patient not taking: Reported on 8/5/2024), Disp: , Rfl:     omeprazole (PriLOSEC) 40 MG capsule, Take 1 capsule (40 mg total) by mouth daily (Patient not taking: Reported on 6/3/2024), Disp: 90 capsule, Rfl: 3    Vitals: Blood pressure 118/70, pulse 62, height 5' 7\" (1.702 m), weight 76.2 kg (168 lb), SpO2 99%.    Body mass index is 26.31 kg/m².  Wt Readings from Last 3 Encounters:   08/05/24 76.2 kg (168 lb)   06/03/24 78.5 kg (173 lb)   02/21/24 80.3 kg (177 lb)     Vitals:    08/05/24 1200   Weight: 76.2 kg (168 lb)     BP Readings from Last 3 Encounters:   08/05/24 118/70   06/03/24 128/80   02/21/24 132/80       Physical Exam:  .Physical Exam  Vitals and nursing note reviewed.   Constitutional:       General: She is not in acute distress.     Appearance: Normal appearance. She is normal weight.   HENT:      Right Ear: External ear normal.      Left Ear: External ear normal.   Eyes:      General: No scleral icterus.        Right eye: No discharge.         Left eye: No discharge.   Cardiovascular:      Rate and Rhythm: Normal rate and regular rhythm.      Pulses: Normal pulses.      Heart sounds: Normal heart " "sounds. No murmur heard.  Pulmonary:      Effort: Pulmonary effort is normal. No respiratory distress.      Breath sounds: Normal breath sounds.   Abdominal:      General: Bowel sounds are normal. There is no distension.      Palpations: Abdomen is soft.   Musculoskeletal:      Right lower leg: No edema.      Left lower leg: No edema.   Skin:     General: Skin is warm and dry.      Capillary Refill: Capillary refill takes less than 2 seconds.   Neurological:      General: No focal deficit present.      Mental Status: She is alert. Mental status is at baseline.   Psychiatric:         Mood and Affect: Mood normal.           Diagnostic Studies Review Cardio:  EKG:  lead EKG demonstrates sinus rhythm with a single unit focal PVCs      Dionne BROWN  Cardiology        \"This note was completed in part utilizing [employer] direct voice recognition software.   Grammatical errors, random word insertion, spelling mistakes, and incomplete sentences may be an occasional consequence of the system secondary to software limitations, ambient noise and hardware issues.    Please read the chart carefully and recognize, using context, where substitutions have occurred.  If you have any questions or concerns about the context, text or information contained within the body of this dictation, please contact myself, the provider, for further clarification.\"  "

## 2024-08-07 ENCOUNTER — TELEPHONE (OUTPATIENT)
Dept: PSYCHIATRY | Facility: CLINIC | Age: 49
End: 2024-08-07

## 2024-08-07 NOTE — TELEPHONE ENCOUNTER
Patient called in to confirm they were on the wait list for ADHD services due to being referred by their current provider and how long it would be to get the services.    Writer confirmed patient had been placed on the wait list. Writer confirmed patient address to continue research with the [place] [employer].  Please mail out the packet for further patient research.

## 2024-08-09 ENCOUNTER — TELEPHONE (OUTPATIENT)
Dept: CARDIOLOGY CLINIC | Facility: CLINIC | Age: 49
End: 2024-08-09

## 2024-08-09 DIAGNOSIS — Z82.49 FAMILY HISTORY OF ABDOMINAL AORTIC ANEURYSM (AAA): Primary | ICD-10-CM

## 2024-08-09 NOTE — TELEPHONE ENCOUNTER
Left message for patient that Ct Scan Abd and Pelvis was denied by insurance. Order placed for Ultrasound of Abdominal Aorta .    I did not cx the ct scan yet told patient to call the office with any questions. And also provided Central scheduling number

## 2024-09-04 ENCOUNTER — ANNUAL EXAM (OUTPATIENT)
Dept: OBGYN CLINIC | Facility: CLINIC | Age: 49
End: 2024-09-04
Payer: COMMERCIAL

## 2024-09-04 VITALS
WEIGHT: 166 LBS | DIASTOLIC BLOOD PRESSURE: 66 MMHG | HEIGHT: 67 IN | BODY MASS INDEX: 26.06 KG/M2 | SYSTOLIC BLOOD PRESSURE: 110 MMHG

## 2024-09-04 DIAGNOSIS — N95.9 POSTMENOPAUSAL SYMPTOMS: ICD-10-CM

## 2024-09-04 DIAGNOSIS — N95.1 VASOMOTOR SYMPTOMS DUE TO MENOPAUSE: ICD-10-CM

## 2024-09-04 DIAGNOSIS — Z12.31 ENCOUNTER FOR SCREENING MAMMOGRAM FOR BREAST CANCER: ICD-10-CM

## 2024-09-04 DIAGNOSIS — Z01.419 WOMEN'S ANNUAL ROUTINE GYNECOLOGICAL EXAMINATION: Primary | ICD-10-CM

## 2024-09-04 DIAGNOSIS — Z12.4 SCREENING FOR CERVICAL CANCER: ICD-10-CM

## 2024-09-04 DIAGNOSIS — Z79.890 HORMONE REPLACEMENT THERAPY (HRT): ICD-10-CM

## 2024-09-04 PROCEDURE — 3078F DIAST BP <80 MM HG: CPT | Performed by: STUDENT IN AN ORGANIZED HEALTH CARE EDUCATION/TRAINING PROGRAM

## 2024-09-04 PROCEDURE — 3074F SYST BP LT 130 MM HG: CPT | Performed by: STUDENT IN AN ORGANIZED HEALTH CARE EDUCATION/TRAINING PROGRAM

## 2024-09-04 PROCEDURE — 99396 PREV VISIT EST AGE 40-64: CPT | Performed by: STUDENT IN AN ORGANIZED HEALTH CARE EDUCATION/TRAINING PROGRAM

## 2024-09-04 RX ORDER — ATOMOXETINE 18 MG/1
CAPSULE ORAL
COMMUNITY
Start: 2024-09-03

## 2024-09-04 RX ORDER — ATOMOXETINE 25 MG/1
25 CAPSULE ORAL DAILY
COMMUNITY
Start: 2024-08-21

## 2024-09-04 RX ORDER — NORETHINDRONE ACETATE AND ETHINYL ESTRADIOL .5; 2.5 MG/1; UG/1
1 TABLET ORAL DAILY
Qty: 28 TABLET | Refills: 12 | Status: SHIPPED | OUTPATIENT
Start: 2024-09-04

## 2024-09-04 NOTE — PROGRESS NOTES
[facility]   Annual Well Woman Exam  [place]    ASSESSMENT & PLAN: Julisa Teran is a 48 y.o.  with normal gynecologic exam.    1.  Routine well woman exam done today.  2.  Pap and HPV: Pap with HPV was not done today.  Current ASCCP Guidelines reviewed.  3.  Mammogram ordered. Recommend yearly mammography per ACOG guidelines.   4.  Julisa is low risk and does not need or desire STI testing today  5.  Julisa is not currently sexually active, using HRT with excellent improvement in symptoms, refilled for the year  6. The following were reviewed in today's visit: breast self exam, mammography screening ordered, adequate intake of calcium and vitamin D, exercise, and healthy diet.  7. Return to office in 12 months for annual, sooner PRN.     All questions answered to the best of my ability.      Subjective:    CC:  Annual Gynecologic Examination    HPI: Julisa Teran is a 48 y.o.  who presents for annual gynecologic examination.      Started HRT  Sleeping better, hasn't had hot flash   Such a relief   Energy is good  Mild breast tenderness on HRT, bilateral  Maybe minor spotting when started HRT (3 months)    On metoprolol for PVCs    Pap 2022: NILM, HPV neb    Mammo 2023:  BIRADS1  Colonoscopy 2023: normal, told repeat in 10 years      GYN  Complaints: denies  No pelvic pain, discharge  No vaginal dryness    No hx of abnormal paps or STIs    OB       G/U  Complaints: denies  Denies hematuria, urinary incontinence, and dysuria    Breast  Complaints: denies  Family hx: maternal aunt and great aunt with breast cancer  denies fhx or uterine, ovarian, or colon cancers    Health Maintenance:    She exercises regularly--works with , does lifting/strength training, 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    She does not use tobacco.      Past Medical History:   Diagnosis Date    Achilles bursitis of left lower extremity      Anxiety     Asthma     Depression     Primary hypertension        Past Surgical History:   Procedure Laterality Date    COLONOSCOPY      EGD AND COLONOSCOPY  09/13/2023    TONSILLECTOMY         Past OB/Gyn History:     No LMP recorded. Patient is postmenopausal.    Family History:  Family History   Problem Relation Age of Onset    Ulcerative colitis Mother     Hypertension Mother     Lung cancer Mother     Hypertension Father     Lung cancer Father     Lung cancer Family     No Known Problems Sister     No Known Problems Maternal Uncle     No Known Problems Paternal Aunt     No Known Problems Paternal Uncle     No Known Problems Maternal Grandfather     No Known Problems Paternal Grandmother     No Known Problems Paternal Grandfather     Breast cancer Other     ADD / ADHD Neg Hx     Anesthesia problems Neg Hx     Cancer Neg Hx     Clotting disorder Neg Hx     Collagen disease Neg Hx     Diabetes Neg Hx     Dislocations Neg Hx     Learning disabilities Neg Hx     Neurological problems Neg Hx     Osteoporosis Neg Hx     Rheumatologic disease Neg Hx     Scoliosis Neg Hx     Vascular Disease Neg Hx        Social History:  Social History     Socioeconomic History    Marital status: Single     Spouse name: Not on file    Number of children: Not on file    Years of education: Not on file    Highest education level: Not on file   Occupational History    Not on file   Tobacco Use    Smoking status: Never    Smokeless tobacco: Never   Vaping Use    Vaping status: Never Used   Substance and Sexual Activity    Alcohol use: Yes     Comment: OCC / rare    Drug use: No    Sexual activity: Not Currently     Partners: Male   Other Topics Concern    Not on file   Social History Narrative    Not on file     Social Determinants of Health     Financial Resource Strain: Not on file   Food Insecurity: Not on file   Transportation Needs: Not on file   Physical Activity: Not on file   Stress: Not on file   Social Connections: Not on file  "  Intimate Partner Violence: Not on file   Housing Stability: Not on file         Allergies   Allergen Reactions    Cephalexin Rash    Lisinopril Itching and Other (See Comments)     Swelling of the tongue    Penicillins Rash       Current Outpatient Medications:     albuterol (PROVENTIL HFA,VENTOLIN HFA) 90 mcg/act inhaler, Inhale 2 puffs every 6 (six) hours as needed for wheezing, Disp: 8 g, Rfl: 1    amLODIPine (NORVASC) 5 mg tablet, Take 1 tablet (5 mg total) by mouth daily, Disp: 90 tablet, Rfl: 1    baclofen 10 mg tablet, Take 1 tablet (10 mg total) by mouth 3 (three) times a day, Disp: 90 tablet, Rfl: 5    gabapentin (Neurontin) 300 mg capsule, Take 1 capsule (300 mg total) by mouth daily at bedtime, Disp: 30 capsule, Rfl: 5    metoprolol succinate (TOPROL-XL) 25 mg 24 hr tablet, Take 1 tablet (25 mg total) by mouth daily, Disp: 90 tablet, Rfl: 1    norethindrone-ethinyl estradiol (Fyavolv) 0.5-2.5 MG-MCG per tablet, Take 1 tablet by mouth daily, Disp: 28 tablet, Rfl: 12    atoMOXetine (STRATTERA) 18 mg capsule, , Disp: , Rfl:     atoMOXetine (STRATTERA) 25 mg capsule, Take 25 mg by mouth daily (Patient not taking: Reported on 9/4/2024), Disp: , Rfl:     baclofen 10 mg tablet, take 1 tablet by mouth three times a day (Patient not taking: Reported on 9/4/2024), Disp: 90 tablet, Rfl: 0    omeprazole (PriLOSEC) 40 MG capsule, Take 1 capsule (40 mg total) by mouth daily (Patient not taking: Reported on 6/3/2024), Disp: 90 capsule, Rfl: 3    Review of Systems:  Denies chest pain, shortness of breath, abdominal pain, constipation, vaginal discharge. All other systems negative unless otherwise stated.     Physical Exam:  /66 (BP Location: Right arm, Patient Position: Sitting, Cuff Size: Standard)   Ht 5' 7\" (1.702 m)   Wt 75.3 kg (166 lb)   BMI 26.00 kg/m²  Body mass index is 26 kg/m².   GEN: The patient was alert and oriented x3, pleasant well-appearing female in no acute distress.   HEENT:  Unremarkable, " no anterior or posterior lymphadenopathy, no thyromegaly  CV:  Regular rate and rhythm, normal S1 and S2, no murmurs  RESP:  Clear to auscultation bilaterally, no wheezes, rales or rhonchi  BREAST:  Symmetric breasts with no palpable breast masses or obvious breast lesions. She has no retractions or nipple discharge. She has no axillary abnormalities or palpable masses.   GI:  Soft, nontender, non-distended  MSK: bilateral lower extremities are nontender, no edema  : Normal appearing external female genitalia, normal appearing urethral meatus. On sterile speculum exam, normal appearing vaginal epithelium, no vaginal discharge, no bleeding, grossly normal appearing cervix. On bimanual exam, no cervical motion tenderness; uterus is smooth, mobile, non-tender, normal size. No tenderness or fullness in the bilateral adnexa.   LEONILA: deferred

## 2024-09-05 DIAGNOSIS — M54.41 CHRONIC LEFT-SIDED LOW BACK PAIN WITH RIGHT-SIDED SCIATICA: ICD-10-CM

## 2024-09-05 DIAGNOSIS — G89.29 CHRONIC LEFT-SIDED LOW BACK PAIN WITH RIGHT-SIDED SCIATICA: ICD-10-CM

## 2024-09-06 RX ORDER — GABAPENTIN 300 MG/1
300 CAPSULE ORAL
Qty: 30 CAPSULE | Refills: 5 | Status: SHIPPED | OUTPATIENT
Start: 2024-09-06

## 2024-09-30 ENCOUNTER — TELEPHONE (OUTPATIENT)
Age: 49
End: 2024-09-30

## 2024-09-30 NOTE — TELEPHONE ENCOUNTER
Called patient to schedule a f/u appt because she sent in a [facility] appointment request for the [place] office. Patient did not answer and I left a vm for her to call the office back to schedule.

## 2024-11-26 DIAGNOSIS — I10 PRIMARY HYPERTENSION: ICD-10-CM

## 2024-11-27 RX ORDER — AMLODIPINE BESYLATE 5 MG/1
5 TABLET ORAL DAILY
Qty: 90 TABLET | Refills: 0 | Status: SHIPPED | OUTPATIENT
Start: 2024-11-27

## 2024-12-31 ENCOUNTER — TELEPHONE (OUTPATIENT)
Age: 49
End: 2024-12-31

## 2024-12-31 ENCOUNTER — OFFICE VISIT (OUTPATIENT)
Dept: FAMILY MEDICINE CLINIC | Facility: CLINIC | Age: 49
End: 2024-12-31
Payer: COMMERCIAL

## 2024-12-31 VITALS
WEIGHT: 166 LBS | OXYGEN SATURATION: 98 % | TEMPERATURE: 97.6 F | HEIGHT: 67 IN | BODY MASS INDEX: 26.06 KG/M2 | RESPIRATION RATE: 12 BRPM | HEART RATE: 59 BPM | DIASTOLIC BLOOD PRESSURE: 80 MMHG | SYSTOLIC BLOOD PRESSURE: 128 MMHG

## 2024-12-31 DIAGNOSIS — J45.40 MODERATE PERSISTENT ASTHMA WITHOUT COMPLICATION: ICD-10-CM

## 2024-12-31 DIAGNOSIS — I10 PRIMARY HYPERTENSION: ICD-10-CM

## 2024-12-31 DIAGNOSIS — J22 LOWER RESPIRATORY TRACT INFECTION: Primary | ICD-10-CM

## 2024-12-31 PROCEDURE — 99214 OFFICE O/P EST MOD 30 MIN: CPT | Performed by: STUDENT IN AN ORGANIZED HEALTH CARE EDUCATION/TRAINING PROGRAM

## 2024-12-31 RX ORDER — METHYLPREDNISOLONE 4 MG/1
TABLET ORAL
Qty: 21 EACH | Refills: 0 | Status: SHIPPED | OUTPATIENT
Start: 2024-12-31

## 2024-12-31 RX ORDER — ALBUTEROL SULFATE 90 UG/1
2 INHALANT RESPIRATORY (INHALATION) EVERY 6 HOURS PRN
Qty: 8 G | Refills: 1 | Status: SHIPPED | OUTPATIENT
Start: 2024-12-31

## 2024-12-31 RX ORDER — DOXYCYCLINE HYCLATE 100 MG
100 TABLET ORAL 2 TIMES DAILY
Qty: 14 TABLET | Refills: 0 | Status: SHIPPED | OUTPATIENT
Start: 2024-12-31 | End: 2025-01-07

## 2024-12-31 NOTE — TELEPHONE ENCOUNTER
Patient calling in with symptoms noted below. She was calling to get seen today but there are no available appointments.  Please call patient back to see if she can do a virtual visit with Terrance who has an open spot at 1030am    tight cough, headache, covid negative, since [email or web address]

## 2024-12-31 NOTE — PROGRESS NOTES
Clinic Visit Note  Julisa Teran 49 y.o. female   MRN: [id number]    Assessment and Plan      Diagnoses and all orders for this visit:    Lower respiratory tract infection  -     doxycycline hyclate (VIBRA-TABS) 100 mg tablet; Take 1 tablet (100 mg total) by mouth 2 (two) times a day for 7 days  -     methylPREDNISolone 4 MG tablet therapy pack; Use as directed on package    Primary hypertension    Moderate persistent asthma without complication  -     albuterol (PROVENTIL HFA,VENTOLIN HFA) 90 mcg/act inhaler; Inhale 2 puffs every 6 (six) hours as needed for wheezing      Patient is a pleasant 57-year-old female coming in secondary to upper/lower respiratory tract infection symptoms, recommend atypical antibiotic with doxycycline, steroid taper to help relieve congestion/inflammation, albuterol inhaler for symptomatic treatment, if symptoms worsen or not improving reevaluation recommended.    My impressions and treatment recommendations were discussed in detail with the patient who verbalized understanding and had no further questions.  Discharge instructions were provided.    Subjective     Chief Complaint: Acute care visit    History of Present Illness:    Patient is a pleasant 49-year-old female coming in secondary to upper/lower respiratory tract infection symptoms.    The following portions of the patient's history were reviewed and updated as appropriate: allergies, current medications, past family history, past medical history, past social history, past surgical history and problem list.    REVIEW OF SYSTEMS:  A complete 12-point review of systems is negative other than that noted in the HPI.    Review of Systems   Constitutional:  Negative for chills and fever.   HENT:  Positive for congestion and postnasal drip. Negative for ear pain and sore throat.    Eyes:  Negative for pain and visual disturbance.   Respiratory:  Positive for cough. Negative for shortness of breath and wheezing.    Cardiovascular:   Negative for chest pain and palpitations.   Gastrointestinal:  Negative for abdominal pain and vomiting.   Genitourinary:  Negative for dysuria and hematuria.   Musculoskeletal:  Negative for arthralgias and back pain.   Skin:  Negative for color change and rash.   Neurological:  Negative for seizures and syncope.   All other systems reviewed and are negative.        Current Outpatient Medications:   •  albuterol (PROVENTIL HFA,VENTOLIN HFA) 90 mcg/act inhaler, Inhale 2 puffs every 6 (six) hours as needed for wheezing, Disp: 8 g, Rfl: 1  •  amLODIPine (NORVASC) 5 mg tablet, take 1 tablet by mouth once daily, Disp: 90 tablet, Rfl: 0  •  baclofen 10 mg tablet, Take 1 tablet (10 mg total) by mouth 3 (three) times a day, Disp: 90 tablet, Rfl: 5  •  doxycycline hyclate (VIBRA-TABS) 100 mg tablet, Take 1 tablet (100 mg total) by mouth 2 (two) times a day for 7 days, Disp: 14 tablet, Rfl: 0  •  gabapentin (Neurontin) 300 mg capsule, Take 1 capsule (300 mg total) by mouth daily at bedtime, Disp: 30 capsule, Rfl: 5  •  methylPREDNISolone 4 MG tablet therapy pack, Use as directed on package, Disp: 21 each, Rfl: 0  •  metoprolol succinate (TOPROL-XL) 25 mg 24 hr tablet, Take 1 tablet (25 mg total) by mouth daily, Disp: 90 tablet, Rfl: 1  •  norethindrone-ethinyl estradiol (Fyavolv) 0.5-2.5 MG-MCG per tablet, Take 1 tablet by mouth daily, Disp: 28 tablet, Rfl: 12  •  atoMOXetine (STRATTERA) 18 mg capsule, , Disp: , Rfl:   •  atoMOXetine (STRATTERA) 25 mg capsule, Take 25 mg by mouth daily (Patient not taking: Reported on 9/4/2024), Disp: , Rfl:   •  baclofen 10 mg tablet, take 1 tablet by mouth three times a day (Patient not taking: Reported on 9/4/2024), Disp: 90 tablet, Rfl: 0  •  omeprazole (PriLOSEC) 40 MG capsule, Take 1 capsule (40 mg total) by mouth daily (Patient not taking: Reported on 6/3/2024), Disp: 90 capsule, Rfl: 3  Past Medical History:   Diagnosis Date   • Achilles bursitis of left lower extremity    • Anxiety     • Asthma    • Depression    • Primary hypertension      Past Surgical History:   Procedure Laterality Date   • COLONOSCOPY     • EGD AND COLONOSCOPY  09/13/2023   • TONSILLECTOMY       Social History     Socioeconomic History   • Marital status: Single     Spouse name: Not on file   • Number of children: Not on file   • Years of education: Not on file   • Highest education level: Not on file   Occupational History   • Not on file   Tobacco Use   • Smoking status: Never   • Smokeless tobacco: Never   Vaping Use   • Vaping status: Never Used   Substance and Sexual Activity   • Alcohol use: Yes     Comment: OCC / rare   • Drug use: No   • Sexual activity: Not Currently     Partners: Male   Other Topics Concern   • Not on file   Social History Narrative   • Not on file     Social Drivers of Health     Financial Resource Strain: Not on file   Food Insecurity: Not on file   Transportation Needs: Not on file   Physical Activity: Not on file   Stress: Not on file   Social Connections: Not on file   Intimate Partner Violence: Not on file   Housing Stability: Not on file     Family History   Problem Relation Age of Onset   • Ulcerative colitis Mother    • Hypertension Mother    • Lung cancer Mother    • Hypertension Father    • Lung cancer Father    • Lung cancer Family    • No Known Problems Sister    • No Known Problems Maternal Uncle    • No Known Problems Paternal Aunt    • No Known Problems Paternal Uncle    • No Known Problems Maternal Grandfather    • No Known Problems Paternal Grandmother    • No Known Problems Paternal Grandfather    • Breast cancer Other    • ADD / ADHD Neg Hx    • Anesthesia problems Neg Hx    • Cancer Neg Hx    • Clotting disorder Neg Hx    • Collagen disease Neg Hx    • Diabetes Neg Hx    • Dislocations Neg Hx    • Learning disabilities Neg Hx    • Neurological problems Neg Hx    • Osteoporosis Neg Hx    • Rheumatologic disease Neg Hx    • Scoliosis Neg Hx    • Vascular Disease Neg Hx   "    Allergies   Allergen Reactions   • Cephalexin Rash   • Lisinopril Itching and Other (See Comments)     Swelling of the tongue   • Penicillins Rash       Objective     Vitals:    12/31/24 1332   BP: 128/80   BP Location: Left arm   Patient Position: Sitting   Cuff Size: Standard   Pulse: 59   Resp: 12   Temp: 97.6 °F (36.4 °C)   TempSrc: Temporal   SpO2: 98%   Weight: 75.3 kg (166 lb)   Height: 5' 7\" (1.702 m)       Physical Exam:     GENERAL: NAD, pleasant   HEENT:  NC/AT, PERRL, EOMI, no scleral icterus  CARDIAC:  RRR, +S1/S2, no S3/S4 appreciated, no m/g/r  PULMONARY:  CTA B/L, no wheezing/rales/rhonci, non-labored breathing  ABDOMEN:  Soft, NT/ND, no rebound/guarding/rigidity  Extremities:. No edema, cyanosis, or clubbing  Musculoskeletal:  Full range of motion intact in all extremities   NEUROLOGIC: Grossly intact, no focal deficits  SKIN:  No rashes or erythema noted on exposed skin  Psych: Normal affect, mood stable    ==  PLEASE NOTE:  This encounter was completed utilizing the [employer] Direct Speech Voice Recognition Software. Grammatical errors, random word insertions, pronoun errors and incomplete sentences are occasional consequences of the system due to software limitations, ambient noise and hardware issues.These may be missed by proof reading prior to affixing electronic signature. Any questions or concerns about the content, text or information contained within the body of this dictation should be directly addressed to the physician for clarification. Please do not hesitate to call me directly if you have any any questions or concerns.    Naseem Gutierrez, DO  [facility]   [facility]     "

## 2025-01-03 ENCOUNTER — OFFICE VISIT (OUTPATIENT)
Dept: FAMILY MEDICINE CLINIC | Facility: CLINIC | Age: 50
End: 2025-01-03
Payer: COMMERCIAL

## 2025-01-03 VITALS
RESPIRATION RATE: 18 BRPM | OXYGEN SATURATION: 97 % | HEIGHT: 66 IN | BODY MASS INDEX: 26.33 KG/M2 | TEMPERATURE: 97.4 F | DIASTOLIC BLOOD PRESSURE: 80 MMHG | SYSTOLIC BLOOD PRESSURE: 130 MMHG | HEART RATE: 56 BPM | WEIGHT: 163.8 LBS

## 2025-01-03 DIAGNOSIS — Z00.00 ANNUAL PHYSICAL EXAM: Primary | ICD-10-CM

## 2025-01-03 PROCEDURE — 99396 PREV VISIT EST AGE 40-64: CPT | Performed by: FAMILY MEDICINE

## 2025-01-03 NOTE — PROGRESS NOTES
Chief Complaint   Patient presents with   • Physical Exam        Patient ID: Julisa Teran is a 49 y.o. female.    HPI  Pt is seeing for annual PE -  UTD with GYN visit -  needs to schedule mammogram     The following portions of the patient's history were reviewed and updated as appropriate: allergies, current medications, past family history, past medical history, past social history, past surgical history and problem list.    Review of Systems   Constitutional: Negative.  Negative for fatigue, fever and unexpected weight change.   HENT:  Negative for congestion, ear discharge, ear pain, hearing loss, rhinorrhea, sinus pressure, sore throat and trouble swallowing.    Eyes: Negative.    Respiratory:  Positive for cough (on Doxy and steroids for 4 days -  feeling better). Negative for chest tightness, shortness of breath and wheezing.    Cardiovascular: Negative.    Gastrointestinal: Negative.    Endocrine: Negative.    Genitourinary: Negative.    Musculoskeletal: Negative.    Skin: Negative.    Neurological:  Negative for dizziness, weakness, light-headedness and numbness.   Hematological: Negative.    Psychiatric/Behavioral: Negative.         Current Outpatient Medications   Medication Sig Dispense Refill   • albuterol (PROVENTIL HFA,VENTOLIN HFA) 90 mcg/act inhaler Inhale 2 puffs every 6 (six) hours as needed for wheezing 8 g 1   • amLODIPine (NORVASC) 5 mg tablet take 1 tablet by mouth once daily 90 tablet 0   • baclofen 10 mg tablet Take 1 tablet (10 mg total) by mouth 3 (three) times a day 90 tablet 5   • doxycycline hyclate (VIBRA-TABS) 100 mg tablet Take 1 tablet (100 mg total) by mouth 2 (two) times a day for 7 days 14 tablet 0   • gabapentin (Neurontin) 300 mg capsule Take 1 capsule (300 mg total) by mouth daily at bedtime 30 capsule 5   • methylPREDNISolone 4 MG tablet therapy pack Use as directed on package 21 each 0   • metoprolol succinate (TOPROL-XL) 25 mg 24 hr tablet Take 1 tablet (25 mg total) by  "mouth daily 90 tablet 1   • norethindrone-ethinyl estradiol (Fyavolv) 0.5-2.5 MG-MCG per tablet Take 1 tablet by mouth daily 28 tablet 12     No current facility-administered medications for this visit.       Objective:    /80 (BP Location: Left arm, Patient Position: Sitting, Cuff Size: Standard)   Pulse 56   Temp (!) 97.4 °F (36.3 °C) (Temporal)   Resp 18   Ht 5' 6\" (1.676 m)   Wt 74.3 kg (163 lb 12.8 oz)   SpO2 97%   BMI 26.44 kg/m²        Physical Exam  Constitutional:       General: She is not in acute distress.     Appearance: Normal appearance. She is well-developed. She is not ill-appearing.   HENT:      Head: Normocephalic and atraumatic.      Right Ear: Hearing, tympanic membrane, ear canal and external ear normal.      Left Ear: Hearing, tympanic membrane, ear canal and external ear normal.      Nose: No congestion or rhinorrhea.      Mouth/Throat:      Pharynx: No oropharyngeal exudate or posterior oropharyngeal erythema.   Eyes:      Extraocular Movements: Extraocular movements intact.      Conjunctiva/sclera: Conjunctivae normal.   Neck:      Thyroid: No thyroid mass or thyromegaly.      Vascular: No JVD.   Cardiovascular:      Rate and Rhythm: Normal rate and regular rhythm.      Heart sounds: Normal heart sounds. No murmur heard.     No gallop.   Pulmonary:      Effort: No respiratory distress.      Breath sounds: Normal breath sounds. No wheezing, rhonchi or rales.   Abdominal:      Palpations: Abdomen is soft.      Tenderness: There is no abdominal tenderness. There is no right CVA tenderness or left CVA tenderness.   Musculoskeletal:         General: No swelling or tenderness.      Cervical back: Normal range of motion and neck supple. No rigidity or tenderness.      Right lower leg: No edema.      Left lower leg: No edema.   Lymphadenopathy:      Cervical: No cervical adenopathy.   Skin:     Coloration: Skin is not pale.      Findings: No rash.   Neurological:      Mental Status: She " is alert and oriented to person, place, and time.      Cranial Nerves: No cranial nerve deficit.      Motor: No weakness.      Gait: Gait normal.   Psychiatric:         Mood and Affect: Mood normal.         Behavior: Behavior normal.         Thought Content: Thought content normal.         Judgment: Judgment normal.           Labs in chart were reviewed.      Assessment/Plan:         Diagnoses and all orders for this visit:    Annual physical exam  -     CBC; Future  -     Comprehensive metabolic panel; Future  -     Lipid panel; Future  -     TSH, 3rd generation; Future  -     Hemoglobin A1C; Future  -     CBC  -     Comprehensive metabolic panel  -     Lipid panel  -     TSH, 3rd generation  -     Hemoglobin A1C                            Gwendolyn Fernandez MD

## 2025-01-15 ENCOUNTER — TELEPHONE (OUTPATIENT)
Age: 50
End: 2025-01-15

## 2025-01-15 NOTE — TELEPHONE ENCOUNTER
Pt.made aware. Pt.confirmed not having any symptoms currently. She will keep her follow up in March.

## 2025-01-15 NOTE — TELEPHONE ENCOUNTER
Patient Julisa [phone] established patient of Dr. Oleary, contacting office concerned about low HR.    Patient reported HR at night goes down to 50.    Patient will be traveling on 1/23/2025 and would like to speak with clinical staff prior to leaving just in case her medication needs to be adjusted.     Thank you.   day(s)

## 2025-01-16 DIAGNOSIS — G89.29 CHRONIC LEFT-SIDED LOW BACK PAIN WITH RIGHT-SIDED SCIATICA: ICD-10-CM

## 2025-01-16 DIAGNOSIS — M54.41 CHRONIC LEFT-SIDED LOW BACK PAIN WITH RIGHT-SIDED SCIATICA: ICD-10-CM

## 2025-01-16 RX ORDER — GABAPENTIN 300 MG/1
300 CAPSULE ORAL
Qty: 30 CAPSULE | Refills: 0 | OUTPATIENT
Start: 2025-01-16

## 2025-01-20 DIAGNOSIS — M54.41 CHRONIC LEFT-SIDED LOW BACK PAIN WITH RIGHT-SIDED SCIATICA: ICD-10-CM

## 2025-01-20 DIAGNOSIS — G89.29 CHRONIC LEFT-SIDED LOW BACK PAIN WITH RIGHT-SIDED SCIATICA: ICD-10-CM

## 2025-01-20 RX ORDER — GABAPENTIN 300 MG/1
300 CAPSULE ORAL
Qty: 30 CAPSULE | Refills: 5 | Status: SHIPPED | OUTPATIENT
Start: 2025-01-20

## 2025-01-21 DIAGNOSIS — R00.2 PALPITATION: ICD-10-CM

## 2025-01-21 DIAGNOSIS — I49.3 PVC'S (PREMATURE VENTRICULAR CONTRACTIONS): ICD-10-CM

## 2025-01-22 RX ORDER — METOPROLOL SUCCINATE 25 MG/1
25 TABLET, EXTENDED RELEASE ORAL DAILY
Qty: 90 TABLET | Refills: 1 | Status: SHIPPED | OUTPATIENT
Start: 2025-01-22

## 2025-02-13 ENCOUNTER — TELEPHONE (OUTPATIENT)
Age: 50
End: 2025-02-13

## 2025-02-13 NOTE — TELEPHONE ENCOUNTER
Contacted patient off of Medication Management  wait list to verify needs of services in attempts to update list with patient preferences. LVM for patient to contact intake dept  in regards to UPDATING WAIT LIST

## 2025-02-24 DIAGNOSIS — I10 PRIMARY HYPERTENSION: ICD-10-CM

## 2025-02-25 RX ORDER — AMLODIPINE BESYLATE 5 MG/1
5 TABLET ORAL DAILY
Qty: 90 TABLET | Refills: 1 | Status: SHIPPED | OUTPATIENT
Start: 2025-02-25

## 2025-03-06 NOTE — TELEPHONE ENCOUNTER
Contacted patient off of Medication Management  wait list to verify needs of services in attempts to update list with patient preferences. LVM for patient to contact intake dept  in regards to UPDATING WAIT LIST    Second attempt. Pt removed from wait list.

## 2025-03-19 ENCOUNTER — OFFICE VISIT (OUTPATIENT)
Dept: FAMILY MEDICINE CLINIC | Facility: CLINIC | Age: 50
End: 2025-03-19
Payer: COMMERCIAL

## 2025-03-19 VITALS
RESPIRATION RATE: 18 BRPM | DIASTOLIC BLOOD PRESSURE: 90 MMHG | WEIGHT: 175.2 LBS | SYSTOLIC BLOOD PRESSURE: 130 MMHG | OXYGEN SATURATION: 100 % | BODY MASS INDEX: 27.5 KG/M2 | HEART RATE: 61 BPM | HEIGHT: 67 IN | TEMPERATURE: 98 F

## 2025-03-19 DIAGNOSIS — F32.9 REACTIVE DEPRESSION: ICD-10-CM

## 2025-03-19 DIAGNOSIS — I10 PRIMARY HYPERTENSION: ICD-10-CM

## 2025-03-19 DIAGNOSIS — R00.2 PALPITATION: ICD-10-CM

## 2025-03-19 DIAGNOSIS — I49.3 PVC'S (PREMATURE VENTRICULAR CONTRACTIONS): ICD-10-CM

## 2025-03-19 DIAGNOSIS — F41.9 ANXIETY: Primary | ICD-10-CM

## 2025-03-19 PROCEDURE — 99214 OFFICE O/P EST MOD 30 MIN: CPT | Performed by: FAMILY MEDICINE

## 2025-03-19 RX ORDER — ESCITALOPRAM OXALATE 10 MG/1
10 TABLET ORAL DAILY
Qty: 90 TABLET | Refills: 1 | Status: SHIPPED | OUTPATIENT
Start: 2025-03-19 | End: 2025-09-15

## 2025-03-19 RX ORDER — BUSPIRONE HYDROCHLORIDE 10 MG/1
10 TABLET ORAL 3 TIMES DAILY
Qty: 270 TABLET | Refills: 1 | Status: SHIPPED | OUTPATIENT
Start: 2025-03-19

## 2025-03-19 RX ORDER — METOPROLOL SUCCINATE 25 MG/1
25 TABLET, EXTENDED RELEASE ORAL DAILY
Qty: 90 TABLET | Refills: 1 | Status: SHIPPED | OUTPATIENT
Start: 2025-03-19

## 2025-03-19 NOTE — PROGRESS NOTES
"Name: Julisa Teran      : 1975      MRN: [id number]  Encounter Provider: Gwendolyn Fernandez MD  Encounter Date: 3/19/2025   Encounter department: [facility]    Assessment & Plan  Anxiety    Orders:  •  escitalopram (LEXAPRO) 10 mg tablet; Take 1 tablet (10 mg total) by mouth daily  •  busPIRone (BUSPAR) 10 mg tablet; Take 1 tablet (10 mg total) by mouth 3 (three) times a day    Reactive depression  Depression Screening Follow-up Plan: Patient's depression screening was positive with a PHQ-2 score of 4. Their PHQ-9 score was 11. Patient with underlying depression and was advised to continue current medications as prescribed.    Orders:  •  escitalopram (LEXAPRO) 10 mg tablet; Take 1 tablet (10 mg total) by mouth daily    Primary hypertension         Palpitation    Orders:  •  metoprolol succinate (TOPROL-XL) 25 mg 24 hr tablet; Take 1 tablet (25 mg total) by mouth daily    PVC's (premature ventricular contractions)    Orders:  •  metoprolol succinate (TOPROL-XL) 25 mg 24 hr tablet; Take 1 tablet (25 mg total) by mouth daily      Depression Screening and Follow-up Plan:   Patient with underlying depression and was advised to continue current medications as prescribed.         History of Present Illness     Pt is seeing for worsening Anxiety and feeling down for several months \" due to political situation\"  -  was taking meds many years ago -  wants to restart  -  no SI       Review of Systems   Constitutional: Negative.    Respiratory: Negative.     Cardiovascular: Negative.    Gastrointestinal: Negative.    Genitourinary: Negative.    Musculoskeletal: Negative.    Skin: Negative.    Neurological: Negative.    Psychiatric/Behavioral:  Positive for dysphoric mood and sleep disturbance. Negative for behavioral problems, confusion and suicidal ideas. The patient is nervous/anxious.      Past Medical History:   Diagnosis Date   • Achilles bursitis of left lower extremity    • Anxiety    • " Asthma    • Depression    • Primary hypertension      Past Surgical History:   Procedure Laterality Date   • COLONOSCOPY     • EGD AND COLONOSCOPY  09/13/2023   • TONSILLECTOMY       Family History   Problem Relation Age of Onset   • Ulcerative colitis Mother    • Hypertension Mother    • Lung cancer Mother    • Hypertension Father    • Lung cancer Father    • Lung cancer Family    • No Known Problems Sister    • No Known Problems Maternal Uncle    • No Known Problems Paternal Aunt    • No Known Problems Paternal Uncle    • No Known Problems Maternal Grandfather    • No Known Problems Paternal Grandmother    • No Known Problems Paternal Grandfather    • Breast cancer Other    • ADD / ADHD Neg Hx    • Anesthesia problems Neg Hx    • Cancer Neg Hx    • Clotting disorder Neg Hx    • Collagen disease Neg Hx    • Diabetes Neg Hx    • Dislocations Neg Hx    • Learning disabilities Neg Hx    • Neurological problems Neg Hx    • Osteoporosis Neg Hx    • Rheumatologic disease Neg Hx    • Scoliosis Neg Hx    • Vascular Disease Neg Hx      Social History     Tobacco Use   • Smoking status: Never   • Smokeless tobacco: Never   Vaping Use   • Vaping status: Never Used   Substance and Sexual Activity   • Alcohol use: Yes     Comment: OCC / rare   • Drug use: No   • Sexual activity: Not Currently     Partners: Male     Current Outpatient Medications on File Prior to Visit   Medication Sig   • albuterol (PROVENTIL HFA,VENTOLIN HFA) 90 mcg/act inhaler Inhale 2 puffs every 6 (six) hours as needed for wheezing   • amLODIPine (NORVASC) 5 mg tablet take 1 tablet by mouth once daily   • baclofen 10 mg tablet Take 1 tablet (10 mg total) by mouth 3 (three) times a day   • gabapentin (Neurontin) 300 mg capsule Take 1 capsule (300 mg total) by mouth daily at bedtime   • norethindrone-ethinyl estradiol (Fyavolv) 0.5-2.5 MG-MCG per tablet Take 1 tablet by mouth daily   • [DISCONTINUED] metoprolol succinate (TOPROL-XL) 25 mg 24 hr tablet Take 1  "tablet (25 mg total) by mouth daily   • [DISCONTINUED] methylPREDNISolone 4 MG tablet therapy pack Use as directed on package (Patient not taking: Reported on 3/19/2025)     Allergies   Allergen Reactions   • Cephalexin Rash   • Lisinopril Itching and Other (See Comments)     Swelling of the tongue   • Penicillins Rash     Immunization History   Administered Date(s) Administered   • COVID-19 PFIZER VACCINE 0.3 ML IM 04/09/2021, 04/30/2021, 11/15/2021, 10/05/2023   • COVID-19 Pfizer vac (Juan-sucrose, gray cap) 12 yr+ IM 12/13/2022, 11/13/2024   • INFLUENZA 12/13/2022, 10/20/2023, 11/13/2024   • Tdap 10/28/2011, 10/15/2021     Objective   /90 (BP Location: Left arm, Patient Position: Sitting, Cuff Size: Standard)   Pulse 61   Temp 98 °F (36.7 °C) (Temporal)   Resp 18   Ht 5' 7\" (1.702 m)   Wt 79.5 kg (175 lb 3.2 oz)   SpO2 100%   BMI 27.44 kg/m²     Physical Exam  Constitutional:       General: She is not in acute distress.     Appearance: She is not ill-appearing.   Cardiovascular:      Rate and Rhythm: Normal rate.   Pulmonary:      Effort: Pulmonary effort is normal.   Neurological:      Mental Status: She is alert and oriented to person, place, and time.      Cranial Nerves: No cranial nerve deficit.      Motor: No weakness.      Gait: Gait normal.   Psychiatric:         Mood and Affect: Mood normal.         Thought Content: Thought content normal.         Judgment: Judgment normal.         "

## 2025-03-19 NOTE — ASSESSMENT & PLAN NOTE
Orders:  •  metoprolol succinate (TOPROL-XL) 25 mg 24 hr tablet; Take 1 tablet (25 mg total) by mouth daily

## 2025-03-21 ENCOUNTER — APPOINTMENT (OUTPATIENT)
Dept: LAB | Facility: CLINIC | Age: 50
End: 2025-03-21
Payer: COMMERCIAL

## 2025-03-22 ENCOUNTER — HOSPITAL ENCOUNTER (OUTPATIENT)
Dept: RADIOLOGY | Facility: HOSPITAL | Age: 50
Discharge: HOME | End: 2025-03-22
Payer: COMMERCIAL

## 2025-03-22 VITALS — HEIGHT: 67 IN | WEIGHT: 175 LBS | BODY MASS INDEX: 27.47 KG/M2

## 2025-03-22 DIAGNOSIS — Z12.31 ENCOUNTER FOR SCREENING MAMMOGRAM FOR BREAST CANCER: ICD-10-CM

## 2025-03-22 PROCEDURE — 77067 SCR MAMMO BI INCL CAD: CPT

## 2025-03-22 PROCEDURE — 77063 BREAST TOMOSYNTHESIS BI: CPT

## 2025-03-24 ENCOUNTER — RESULTS FOLLOW-UP (OUTPATIENT)
Dept: FAMILY MEDICINE CLINIC | Facility: CLINIC | Age: 50
End: 2025-03-24

## 2025-03-24 ENCOUNTER — RESULTS FOLLOW-UP (OUTPATIENT)
Dept: OBGYN CLINIC | Facility: CLINIC | Age: 50
End: 2025-03-24

## 2025-03-24 NOTE — TELEPHONE ENCOUNTER
Patient understood message about cutting back her water intake to 1.5L, however, she wants to know for how long?  (Until she gets labs done again, and if so, when will that be?)  Thanks.

## 2025-03-25 DIAGNOSIS — E87.1 HYPONATREMIA: Primary | ICD-10-CM

## 2025-03-25 NOTE — RESULT ENCOUNTER NOTE
Patient states she is leaving for [place] today for the next 4 months. She will get labs done when she returns. She will be mindful of fluid intake.

## 2025-04-04 ENCOUNTER — TELEPHONE (OUTPATIENT)
Dept: GASTROENTEROLOGY | Facility: AMBULARY SURGERY CENTER | Age: 50
End: 2025-04-04

## 2025-04-04 NOTE — TELEPHONE ENCOUNTER
Tried to leave message but mailbox is full. Called to schedule recall 1 year f/u. Will send MyChart message

## 2025-06-16 DIAGNOSIS — G89.29 CHRONIC LEFT-SIDED LOW BACK PAIN WITH RIGHT-SIDED SCIATICA: ICD-10-CM

## 2025-06-16 DIAGNOSIS — M54.41 CHRONIC LEFT-SIDED LOW BACK PAIN WITH RIGHT-SIDED SCIATICA: ICD-10-CM

## 2025-06-16 DIAGNOSIS — F41.9 ANXIETY: ICD-10-CM

## 2025-06-16 DIAGNOSIS — R00.2 PALPITATION: ICD-10-CM

## 2025-06-16 DIAGNOSIS — F32.9 REACTIVE DEPRESSION: ICD-10-CM

## 2025-06-16 DIAGNOSIS — I10 PRIMARY HYPERTENSION: ICD-10-CM

## 2025-06-16 DIAGNOSIS — I49.3 PVC'S (PREMATURE VENTRICULAR CONTRACTIONS): ICD-10-CM

## 2025-06-16 RX ORDER — GABAPENTIN 300 MG/1
300 CAPSULE ORAL
Qty: 90 CAPSULE | Refills: 0 | Status: SHIPPED | OUTPATIENT
Start: 2025-06-16

## 2025-06-16 RX ORDER — ESCITALOPRAM OXALATE 10 MG/1
10 TABLET ORAL DAILY
Qty: 90 TABLET | Refills: 0 | Status: SHIPPED | OUTPATIENT
Start: 2025-06-16 | End: 2025-12-13

## 2025-06-16 RX ORDER — METOPROLOL SUCCINATE 25 MG/1
25 TABLET, EXTENDED RELEASE ORAL DAILY
Qty: 90 TABLET | Refills: 0 | Status: SHIPPED | OUTPATIENT
Start: 2025-06-16

## 2025-06-16 RX ORDER — AMLODIPINE BESYLATE 5 MG/1
5 TABLET ORAL DAILY
Qty: 90 TABLET | Refills: 0 | Status: SHIPPED | OUTPATIENT
Start: 2025-06-16

## 2025-06-16 RX ORDER — BUSPIRONE HYDROCHLORIDE 10 MG/1
10 TABLET ORAL 3 TIMES DAILY
Qty: 270 TABLET | Refills: 0 | Status: SHIPPED | OUTPATIENT
Start: 2025-06-16

## 2025-06-16 NOTE — TELEPHONE ENCOUNTER
[facility] called asking for Primary Care Provider JOSELYN#. I was not sure if we were allowed to share this information, or if it needed to come from clinical staff. She is asking for a return phone call as soon as possible either Flor or Coco can take the call. Warm transfer attempted.

## 2025-06-16 NOTE — TELEPHONE ENCOUNTER
Can you please send my Rx's to a different pharmacy for the next 2 months?  (Newest Message First)             Julisa Teran to  Primary Care [facility] (supporting Gwendolyn Fernandez MD)  (Selected Message)        6/16/25  9:52 AM  Hi Dr. ROY,     I am in [place] through the end of August and running out of some Rx's.  Can you please send Rx requests for my Lexapro, Buspar, Amlopidine, Gabapentin, and Metoprolol to the [facility] in [facility]?  I added the pharmacy to my list of pharmacies in [facility].     If you need the pharmacy info as well: [phone], [address] [facility] [address].     Can you let me know when you have sent them so I know I can pick them up?  I am almost out of most of meds.     THANK YOU!!  Julisa